# Patient Record
Sex: FEMALE | Race: WHITE | NOT HISPANIC OR LATINO | Employment: OTHER | URBAN - METROPOLITAN AREA
[De-identification: names, ages, dates, MRNs, and addresses within clinical notes are randomized per-mention and may not be internally consistent; named-entity substitution may affect disease eponyms.]

---

## 2017-10-10 DIAGNOSIS — M25.511 PAIN IN RIGHT SHOULDER: ICD-10-CM

## 2017-10-10 DIAGNOSIS — Z13.29 ENCOUNTER FOR SCREENING FOR OTHER SUSPECTED ENDOCRINE DISORDER: ICD-10-CM

## 2017-10-10 DIAGNOSIS — Z13.1 ENCOUNTER FOR SCREENING FOR DIABETES MELLITUS: ICD-10-CM

## 2017-10-10 DIAGNOSIS — Z13.6 ENCOUNTER FOR SCREENING FOR CARDIOVASCULAR DISORDERS: ICD-10-CM

## 2017-10-10 DIAGNOSIS — Z13.0 ENCOUNTER FOR SCREENING FOR DISEASES OF THE BLOOD AND BLOOD-FORMING ORGANS AND CERTAIN DISORDERS INVOLVING THE IMMUNE MECHANISM: ICD-10-CM

## 2017-10-10 DIAGNOSIS — I10 ESSENTIAL (PRIMARY) HYPERTENSION: ICD-10-CM

## 2017-10-23 ENCOUNTER — ALLSCRIPTS OFFICE VISIT (OUTPATIENT)
Dept: OTHER | Facility: OTHER | Age: 67
End: 2017-10-23

## 2017-11-05 ENCOUNTER — GENERIC CONVERSION - ENCOUNTER (OUTPATIENT)
Dept: OTHER | Facility: OTHER | Age: 67
End: 2017-11-05

## 2017-12-27 ENCOUNTER — GENERIC CONVERSION - ENCOUNTER (OUTPATIENT)
Dept: FAMILY MEDICINE CLINIC | Facility: CLINIC | Age: 67
End: 2017-12-27

## 2018-01-13 NOTE — PROGRESS NOTES
Assessment    1  Welcome to Medicare preventive visit (V70 0) (Z00 00)   2  Screening for depression (V79 0) (Z13 89)   3  Screening for neurological condition (V80 09) (Z13 89)   4  Screening for other and unspecified genitourinary condition (V81 6) (Z13 89)   5  Hypertriglyceridemia (272 1) (E78 1)   6  Chronic right shoulder pain (719 41,338 29) (M25 511,G89 29)   7  Need for vaccination with 13-polyvalent pneumococcal conjugate vaccine (V03 82) (Z23)   8  Need for immunization against influenza (V04 81) (Z23)    Plan  Chronic right shoulder pain    · Meloxicam 15 MG Oral Tablet; TAKE 1 TABLET DAILY WITH FOOD   · * XR SHOULDER 2+ VIEW RIGHT; Status:Active; Requested QRN:[id number];   Hypertriglyceridemia    · Fish Oil 1000 MG Oral Capsule; TAKE 1 CAPSULE 2 x DAILY  Need for immunization against influenza    · Fluzone High-Dose 0 5 ML Intramuscular Suspension Prefilled Syringe  Need for vaccination with 13-polyvalent pneumococcal conjugate vaccine    · Prevnar 13 Intramuscular Suspension  Screening for neurological condition    · *VB - Fall Risk Assessment  (Dx Z13 89 Screen for Neurologic Disorder);  Status:Complete;   Done: [id number] 11:53AM  Screening for other and unspecified genitourinary condition    · *VB - Urinary Incontinence Screen (Dx Z13 89 Screen for UI); Status:Complete;   Done:  [id number] 11:52AM  Welcome to Medicare preventive visit    · EKG/ECG- POC; Status:Complete;   Done: 69HKU6495 01:43PM    Discussion/Summary    Right shoulder giving her pain for the past several months  She has already established with a physical therapist at [facility] and has had a few sessions and has noticed minimal improvement  She does have significant tenderness to her right shoulder localized anterior and superior part of the joint as well as some discomfort with certain movements   I recommended obtaining an x-ray of the right shoulder which the order was provided with her today and directed her to [facility] end facility we will call her with results  I will place her on a short course of meloxicam anti-inflammatory to be taken with food for about 3-4 weeks with side effects discussed  She will continue with formal PT  We will see how she does over the course of the next few weeks and she was advised to follow up in the office with any persistent pain that may require further evaluation referral to Orthopedics  Fasting blood work was discussed with patient today  I did recommend low-fat diet as her triglycerides are elevated in addition to a daily fish oil over-the-counter  Her A1c is also elevated at 6 1% which I advised that she monitor her sugar and carbohydrate intake  She states that she just started working with a nutritionist to her exercise program at the facility where she goes and we will continue to monitor blood work  Follow up in 6 months for blood pressure check  Impression: Welcome to Medicare Visit, with preventive exam as well as age and risk appropriate counseling completed  Cardiovascular screening and counseling: the risks and benefits of screening were discussed, screening is current, EKG recommended, counseling was given on maintaining a healthy diet, counseling was given on maintaining a healthy weight, counseling was given on ways to improve cholesterol, counseling was given on ways to improve exercise tolerance and lipid panel is due every 1 year(s)  Diabetes screening and counseling: the risks and benefits of screening were discussed, screening is current, counseling was given on maintaining a healthy diet, counseling was given on maintaining a healthy weight, counseling was given on ways to improve physical activity and blood glucose is due every 1 year(s)  Colorectal cancer screening and counseling: screening is current     Breast cancer screening and counseling: the risks and benefits of screening were discussed, screening is current and MA to obtain record from [facility] where pt reported having it last done  Cervical cancer screening and counseling: screening is current and following with GYN  Osteoporosis screening and counseling: the risks and benefits of screening were discussed, counseling was given on obtaining adequate amounts of calcium and vitamin D on a daily basis and counseling was given on the importance of regular weightbearing exercise  Abdominal aortic aneurysm screening and counseling: the risks and benefits of screening were discussed and screening not indicated  Glaucoma screening and counseling: screening is current and glaucoma screening due every 1 year(s)  HIV screening and counseling: the risks and benefits of screening were discussed and screening not indicated  Hepatitis C Screening:  The patient declines Hepatitis C screening  Immunizations: influenza vaccine is due today, influenza vaccination is recommended annually, high dose admin today, pneumococcal vaccine due today, prevnar 13 admin today, pneumovax due 1 year, Zostavax vaccination up to date and Tdap vaccination up to date  Advance Directive Planning: complete and up to date, freezer packet given and discussed  requested copy of living will for EHR records  Patient Discussion: plan discussed with the patient, follow-up visit needed in 6 months  Possible side effects of new medications were reviewed with the patient/guardian today  The treatment plan was reviewed with the patient/guardian  The patient/guardian understands and agrees with the treatment plan      Chief Complaint  Pt presents for welcome to medicare  History of Present Illness  HPI: working with PT right shoulder, thinking bursitis  has been seeing PT past month and getting slightly better  Welcome to [employer] and [facility]ness Visits: The patient is being seen for the welcome to medicare visit  Medicare Screening and Risk Factors   Hospitalizations: no previous hospitalizations     Once per lifetime medicare screening tests: ECG (WNL)  Medicare Screening Tests Risk Questions   Abdominal aortic aneurysm risk assessment: none indicated  Osteoporosis risk assessment: , female gender and over 48years of age  HIV risk assessment: none indicated  Drug and Alcohol Use: The patient has never smoked cigarettes  The patient reports rare alcohol use and drinking 1-2 drinks per week  Alcohol concern:   The patient has no concerns about alcohol abuse  She has never used illicit drugs  Diet and Physical Activity: Current diet includes well balanced meals, low salt food choices, 1 cups of coffee per day and 3 bottled water/day  She exercises 4 times per week  Exercise: walking, strength training 60 minutes per day  (pt reports she is working with a Nutritionist through [employer])   Mood Disorder and Cognitive Impairment Screening: PHQ-9 Depression Scale   Over the past 2 weeks, how often have you been bothered by the following problems? 1 ) Little interest or pleasure in doing things? Not at all    2 ) Feeling down, depressed or hopeless? Not at all    3 ) Trouble falling asleep or sleeping too much? Several days  4 ) Feeling tired or having little energy? Not at all    5 ) Poor appetite or overeating? Not at all    6 ) Feeling bad about yourself, or that you are a failure, or have let yourself or your family down? Not at all    7 ) Trouble concentrating on things, such as reading a newspaper or watching television? Not at all    8 ) Moving or speaking so slowly that other people could have noticed, or the opposite, moving or speaking faster than usual? Not at all  TOTAL SCORE: 1  How difficult have these problems made it for you to do your work, take care of things at home, or get along with people? Not at all  She denies feeling down, depressed, or hopeless over the past two weeks  She denies feeling little interest or pleasure in doing things over the past two weeks     Cognitive impairment screening: denies difficulty learning/retaining new information, denies difficulty handling complex tasks, denies difficulty with reasoning, denies difficulty with spatial ability and orientation, denies difficulty with language and denies difficulty with behavior  Functional Ability/Level of Safety: Hearing is normal bilaterally  She denies hearing difficulties  She does not use a hearing aid  The patient is currently able to do activities of daily living without limitations, able to do instrumental activities of daily living without limitations, able to participate in social activities without limitations and able to drive without limitations  Activities of daily living details: does not need help using the phone, no transportation help needed, does not need help shopping, no meal preparation help needed, does not need help doing housework, does not need help doing laundry, does not need help managing medications and does not need help managing money  Fall risk factors: The patient fell 0 times in the past 12 months  Injury History: no polypharmacy, no alcohol use, no mobility impairment, no antidepressant use, no deconditioning, no postural hypotension, no sedative use, no visual impairment, no urinary incontinence, no antihypertensive use, no cognitive impairment, up and go test was normal and no previous fall  Home safety risk factors:  no grab bars in the bathroom, but no unfamiliar surroundings, no loose rugs, no poor household lighting, no uneven floors, no household clutter and handrails on the stairs  Advance Directives: Advance directives: living will, durable power of  for health care directives and advance directives  Co-Managers and Medical Equipment/Suppliers: See Patient Care Team   Preventive Quality Program 65 and Older: Falls Risk: The patient fell 0 times in the past 12 months  The patient is currently asymptomatic Symptoms Include:     The patient currently has no urinary incontinence symptoms  yearly      Review of Systems    Constitutional: negative  Eyes: negative  ENT: negative  Cardiovascular: negative  Respiratory: negative  Gastrointestinal: negative  Genitourinary: negative  Musculoskeletal: as noted in HPI  Integumentary and Breasts: negative  Neurological: negative  Psychiatric: negative  Endocrine: negative  Hematologic and Lymphatic: negative  Active Problems    1  Hypertension (401 9) (I10)   2  Insomnia (780 52) (G47 00)   3  Osteoarthritis (715 90) (M19 90)   4  Pain in right foot (729 5) (M79 671)   5  Postmenopausal (V49 81) (Z78 0)   6  Stress fracture of foot (733 95) (M84 376A)   7  Vitamin D deficiency (268 9) (E55 9)    Past Medical History    · 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  (453 51) (I82 5Y9)   · History of Encounter for screening for osteoporosis (V82 81) (Z78 090)   · History of Need for zoster vaccination (V04 89) (Z23)   · History of Screening for colon cancer (V76 51) (Z12 11)   · History of Screening for deficiency anemia (V78 1) (Z13 0)   · History of Screening for diabetes mellitus (V77 1) (Z13 1)   · History of Screening for heart disease (V81 2) (Z13 6)   · History of Screening for thyroid disorder (V77 0) (Z13 29)   · History of Symptoms involving urinary system (788 99) (R39 9)   · History of Visit for pre-operative examination (V72 84) (M93 790)    Surgical History    · History of Ankle Surgery   · History of Appendectomy   · History of Hand Surgery   · History of Knee Replacement   · History of Neuroplasty Decompression Median Nerve At Carpal Tunnel   · History of Tonsillectomy   · History of Total Knee Arthroplasty    The surgical history was reviewed and updated today         Family History  Sister    · Family history of Lung Cancer (V16 1)   · Family history of Reported Family History Of Heart Disease  Paternal Grandmother    · Family history of Hypertension (V17 49)  Family History    · Family history of Melanoma In Situ Of The Skin    The family history was reviewed and updated today  Social History    · Being A Social Drinker   · Denied: History of Drug Use   · Never a smoker   · Working Full Time   · retired  The social history was reviewed and updated today  Current Meds   1  Lisinopril-Hydrochlorothiazide 20-12 5 MG Oral Tablet; take 1 tablet by mouth daily; Therapy: 75LNA9158 to (Evaluate:28Jgx1984)  Requested for: 17Blm2995; Last   Rx:[id number] Ordered   2  Vitamin D3 2000 UNIT Oral Capsule; 1 tab PO daily; Therapy: 92VNK2862 to (Last Rx:[id number]) Ordered    The medication list was reviewed and updated today  Allergies    1  No Known Drug Allergies    Immunizations   1    Influenza  19-Oct-2015    Tdap  2010    Zoster  19-Oct-2015     Vitals  Signs    Temperature: 98 F, Tympanic  Heart Rate: 70  Pulse Quality: Normal  Respiration Quality: Normal  Respiration: 16  Systolic: 601, LUE, Sitting  Diastolic: 90, LUE, Sitting  Height: 5 ft 7 2 in  Weight: 189 lb 1 oz  BMI Calculated: 29 43  BSA Calculated: 1 98  O2 Saturation: 97, RA  LMP: Menopause  Pain Scale: 1    Physical Exam    Constitutional   General appearance: No acute distress, well appearing and well nourished  appears healthy, comfortable, overweight and appearance reflects stated age  vitals reviewed  Head and Face   Head and face: Normal     Eyes   Conjunctiva and lids: No swelling, erythema or discharge  Pupils and irises: Equal, round, reactive to light  EOMI, PERRLA  wearing hard contacts  Ears, Nose, Mouth, and Throat   External inspection of ears and nose: Normal     Otoscopic examination: Tympanic membranes translucent with normal light reflex  Canals patent without erythema  Hearing: Normal   grossly normal B/L at 10ft  Nasal mucosa, septum, and turbinates: Normal without edema or erythema  Lips, teeth, and gums: Normal, good dentition      Oropharynx: Normal with no erythema, edema, exudate or lesions  Neck   Thyroid: Normal, no thyromegaly  Pulmonary   Respiratory effort: No increased work of breathing or signs of respiratory distress  Auscultation of lungs: Clear to auscultation  Cardiovascular   Auscultation of heart: Normal rate and rhythm, normal S1 and S2, no murmurs  Carotid pulses: 2+ bilaterally  right 2+, no bruit heard over the right carotid, left 2+ and no bruit heard over the left carotid  Pedal pulses: 2+ bilaterally  Examination of extremities for edema and/or varicosities: Normal     Abdomen   Abdomen: Non-tender, no masses  The abdomen was flat  Bowel sounds were normal  The abdomen was soft and nontender  Lymphatic   Palpation of lymph nodes in neck: No lymphadenopathy  Musculoskeletal   Gait and station: Normal     Digits and nails: Normal without clubbing or cyanosis  Joints, bones, and muscles: Abnormal   right shoulder appearing normal  tenderness to palpation localized to anterior/superiod right shoulder joint without other palpable abnormality  She has full AROM of right shoulder with minimal pain upon backwards, internal rotation, as well as external rotation maneuver  strength intact and symmetrical    Skin   Skin and subcutaneous tissue: Normal without rashes or lesions  Neurologic   Cranial nerves: Cranial nerves II-XII intact  Reflexes: 2+ and symmetric  Deep tendon reflexes: 1+ right brachioradialis, 1+ left brachioradialis, 1+ right patella and 1+ left patella  Coordination: Normal finger to nose and heel to shin  Psychiatric   Judgment and insight: Normal     Orientation to person, place, and time: Normal     Mood and affect: Normal        Results/Data  EKG/ECG- POC [id number] 01:43PM Mark Seed     Test Name Result Flag Reference   EKG/ECG      RRR, no evidence of ischemia or acute fndings   low voltage most likely inaccuracy from EKG machine     *VB - Fall Risk Assessment  (Dx Z13 89 Screen for Neurologic Disorder) [id number] 11:53AM Brian Laser     Test Name Result Flag Reference   Falls Risk      No falls in the past year     *VB - Urinary Incontinence Screen (Dx Z13 89 Screen for UI) 23Oct2017 11:52AM Brian Laser     Test Name Result Flag Reference   Urinary Incontinence Assessment [id number]       *VB-Depression Screening [id number] 11:51AM Brian Laser     Test Name Result Flag Reference   Depression Scale Result      Depression Screen - Negative For Symptoms     A 12 lead ECG was performed and was normal  No acute ischemia  Rhythm and rate: normal sinus rhythm  P-waves: the P wave is normal    QRS: low voltage  ST segment: the ST segments are normal    Comparison to prior ECGs:  no prior ECGs were available for comparison  (1) CBC/PLT/DIFF 17Oct2017 08:31AM Brian Laser     Test Name Result Flag Reference   WBC COUNT 6 2     RBC COUNT 4 67     HEMOGLOBIN 14 0     HEMATOCRIT 42 2     MCV 90     MCH 30 0     MCHC 33 3     RDW 13 9     PLATELET COUNT 155     NEUTROPHILS ABSOLUTE COUNT 4 0     LYMPHOCYTES ABSOLUTE COUNT 1 4     MONOCYTES ABSOLUTE COUNT 0 6     EOSINOPHILS ABSOLUTE COUNT 0 2     BASOPHILS ABSOLUTE COUNT 0 0     NEUTROPHILS - REL 63     LYMPHOCYTES - REL 23     MONOCYTES - REL 10     EOSINOPHILS - REL 3     BASOPHILS - REL 1       (1) COMPREHENSIVE METABOLIC PANEL [id number] 26:82ZW Brian Laser     Test Name Result Flag Reference   GLUCOSE,RANDM 88     SODIUM 139     POTASSIUM 4 1     CHLORIDE 104     CARBON DIOXIDE 28     ANION GAP (CALC) 7     BLOOD UREA NITROGEN 18     CREATININE 0 70     CALCIUM 8 8     BILI, TOTAL 0 8     ALK PHOSPHATAS 81     ALT (SGPT) 18     AST(SGOT) 13     ALBUMIN 3 9     TOTAL PROTEIN 6 9     eGFR 91       (1) HEMOGLOBIN A1C 17Oct2017 08:28AM Brian Laser     Test Name Result Flag Reference   HEMOGLOBIN A1C 6 1     EST  AVG   GLUCOSE 128       (1) LIPID PANEL FASTING W DIRECT LDL REFLEX [id number] 08:27AM Brian Laser     Test Name Result Flag Reference   CHOLESTEROL 198     LDL CHOLESTEROL CALCULATED 148     TRIGLYCERIDES 207     HDL,DIRECT 50     LDL Chol   (Direct) 107       Procedure    Procedure:   Results: 20/25 in both eyes with corrective device, 20/25 in the right eye with corrective device, 20/25 in the left eye with corrective device   Color vision was screened with the [place] Test and the results were normal       Signatures   Electronically signed by : Igor Whitaker, [facility]; Oct 23 2017  1:52PM EST                       (Author)    Electronically signed by : Anupam Carver DO; Oct 25 2017 10:26PM EST                       (Co-author)

## 2018-01-14 VITALS
SYSTOLIC BLOOD PRESSURE: 144 MMHG | HEIGHT: 67 IN | DIASTOLIC BLOOD PRESSURE: 90 MMHG | WEIGHT: 189.06 LBS | HEART RATE: 70 BPM | RESPIRATION RATE: 16 BRPM | OXYGEN SATURATION: 97 % | BODY MASS INDEX: 29.67 KG/M2 | TEMPERATURE: 98 F

## 2018-01-15 NOTE — PROGRESS NOTES
Assessment    1  Hypertension (401 9) (I10)    Plan  Hypertension    · Lisinopril-Hydrochlorothiazide 20-12 5 MG Oral Tablet; 1 tab PO daily    Discussion/Summary    Patient is here for recheck of her blood pressures on lisinopril HCTZ 20-12 5  She used to be on fosinopril 40 mg and her blood pressures were unfortunately uncontrolled which is why the switch was made to lisinopril HCTZ  Tolerating the medication very well and denies any side effects  Her blood pressures seem fairly reasonable that she is checking at home and within range of where I would like  Unfortunately, patient has been off of her medication for 4 days now due to running out  Her blood pressure is uncontrolled today  I do feel patient had responded well to the lisinopril HCTZ and will continue her on current dose and get her back on that starting today with refills  I will have her come back in 4 months for another recheck and she will not be due until after that time for blood work  Her last check was October 2015 and was reviewed once more with normal kidney and liver function as well as normal lipids  She is up-to-date with her Pap and mammogram and follows with specialist  She is up-to-date with her vaccines  We will see patient in 4 months, but advised to call sooner if her blood pressures remain consistently greater than 140/90 at home since being back on medication  Possible side effects of new medications were reviewed with the patient/guardian today  The treatment plan was reviewed with the patient/guardian  The patient/guardian understands and agrees with the treatment plan      Chief Complaint  Pt here for f/u with blood pressure  She states she hasn't been taking her medication the past 4 days because she ran out  History of Present Illness  Patient is a 62y/o female here for BP med check, HTN  She is currently on lisinopril HCTZ 20-12 5mg daily  Patient feels well on medication and denies side effects   She has been off of med for past 4 days because couldn't get appt prior to meds running out and didn't realize she could fill them  She has been checking BP's at home: am readings 119/74, 125/79, 127/76, 128/78, 117/72  PM doses 129/79, 144/84, 152/86, 139/88, 143/82, 136/79  Denies chest pains, SOB, headaches, dizziness or blurred vision  Review of Systems    Constitutional: No fever, no chills, feels well, no tiredness, no recent weight gain or weight loss  Cardiovascular: No complaints of slow heart rate, no fast heart rate, no chest pain, no palpitations, no leg claudication, no lower extremity edema  Respiratory: No complaints of shortness of breath, no wheezing, no cough, no SOB on exertion, no orthopnea, no PND  Gastrointestinal: No complaints of abdominal pain, no constipation, no nausea or vomiting, no diarrhea, no bloody stools  Genitourinary: No complaints of dysuria, no incontinence, no pelvic pain, no dysmenorrhea, no vaginal discharge or bleeding  Neurological: No complaints of headache, no confusion, no convulsions, no numbness, no dizziness or fainting, no tingling, no limb weakness, no difficulty walking  Endocrine: No complaints of proptosis, no hot flashes, no muscle weakness, no deepening of the voice, no feelings of weakness  Active Problems    1  Encounter for screening for osteoporosis (V82 81) (T88 637)   2  Hypertension (401 9) (I10)   3  Insomnia (780 52) (G47 00)   4  Need for immunization against influenza (V04 81) (Z23)   5  Need for zoster vaccination (V04 89) (Z23)   6  Osteoarthritis (715 90) (M19 90)   7  Postmenopausal (V49 81) (Z78 0)   8  Vitamin D deficiency (268 9) (E55 9)    Past Medical History    1  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   (453 51) (I82 5Y9)   2  History of Pain in right foot (729 5) (M79 671)   3  History of Screening for colon cancer (V76 51) (Z12 11)   4   History of Screening for deficiency anemia (V78 1) (Z13 0)   5  History of Screening for diabetes mellitus (V77 1) (Z13 1)   6  History of Screening for heart disease (V81 2) (Z13 6)   7  History of Screening for thyroid disorder (V77 0) (Z13 29)   8  History of Symptoms involving urinary system (788 99) (R39 9)   9  History of Visit for pre-operative examination (V72 84) (X69 700)    Surgical History    1  History of Ankle Surgery   2  History of Appendectomy   3  History of Hand Surgery   4  History of Knee Replacement   5  History of Neuroplasty Decompression Median Nerve At Carpal Tunnel   6  History of Tonsillectomy   7  History of Total Knee Arthroplasty    Family History    1  Family history of Lung Cancer (V16 1)   2  Family history of Reported Family History Of Heart Disease    3  Family history of Hypertension (V17 49)    4  Family history of Melanoma In Situ Of The Skin    Social History    · Being A Social Drinker   · Denied: History of Drug Use   · Never A Smoker   · Working Full Time  The social history was reviewed and updated today  Current Meds   1  Lisinopril-Hydrochlorothiazide 20-12 5 MG Oral Tablet; 1 tab PO daily; Therapy: 52MYM7728 to (Last 72 954 091)  Requested for: 27Nov2015 Ordered   2  Vitamin D3 2000 UNIT Oral Capsule; 1 tab PO daily; Therapy: 83CHM9340 to (Last Rx:71Vgs9500) Ordered    The medication list was reviewed and updated today  Allergies    1  No Known Drug Allergies    Vitals  Vital Signs [Data Includes: Current Encounter]    Recorded: 63BLZ4575 06:25PM   Temperature 98 1 F, Tympanic   Heart Rate 84   Respiration 16   Systolic 748   Diastolic 94   Height 5 ft 6 in   Weight 188 lb 8 0 oz   BMI Calculated 30 42   BSA Calculated 1 96   O2 Saturation 98, RA     Physical Exam    Constitutional   General appearance: Abnormal   appears healthy, overweight and appearance reflects stated age  Pulmonary   Respiratory effort: No increased work of breathing or signs of respiratory distress      Auscultation of lungs: Clear to auscultation  Cardiovascular   Auscultation of heart: Normal rate and rhythm, normal S1 and S2, without murmurs  Examination of extremities for edema and/or varicosities: Abnormal   trace B/L nonpitting edema B/L feet and ankles  pedal pulses are palpable B/L  Carotid pulses: Normal   right 2+, no bruit heard over the right carotid, left 2+ and no bruit heard over the left carotid  Abdomen   Abdomen: Non-tender, no masses  The abdomen was flat  Bowel sounds were normal  The abdomen was soft and nontender  Lymphatic   Palpation of lymph nodes in neck: No lymphadenopathy      Psychiatric   Orientation to person, place, and time: Normal          Signatures   Electronically signed by : Jaziel Hatch [facility]; Feb 4 2016  9:23AM EST                       (Author)    Electronically signed by : Joel Pool DO; Feb 5 2016 11:58AM EST                       (Co-author)

## 2018-02-17 DIAGNOSIS — I10 ESSENTIAL HYPERTENSION: Primary | ICD-10-CM

## 2018-02-17 RX ORDER — LISINOPRIL AND HYDROCHLOROTHIAZIDE 20; 12.5 MG/1; MG/1
TABLET ORAL
Qty: 30 TABLET | Refills: 0 | Status: SHIPPED | OUTPATIENT
Start: 2018-02-17 | End: 2018-07-17 | Stop reason: SDUPTHER

## 2018-05-14 ENCOUNTER — OFFICE VISIT (OUTPATIENT)
Dept: FAMILY MEDICINE CLINIC | Facility: CLINIC | Age: 68
End: 2018-05-14
Payer: MEDICARE

## 2018-05-14 VITALS
BODY MASS INDEX: 30.73 KG/M2 | HEART RATE: 80 BPM | OXYGEN SATURATION: 97 % | TEMPERATURE: 98 F | HEIGHT: 66 IN | WEIGHT: 191.2 LBS | DIASTOLIC BLOOD PRESSURE: 80 MMHG | SYSTOLIC BLOOD PRESSURE: 130 MMHG

## 2018-05-14 DIAGNOSIS — M75.121 COMPLETE TEAR OF RIGHT ROTATOR CUFF: ICD-10-CM

## 2018-05-14 DIAGNOSIS — Z86.718 HISTORY OF DVT (DEEP VEIN THROMBOSIS): ICD-10-CM

## 2018-05-14 DIAGNOSIS — I10 ESSENTIAL HYPERTENSION: ICD-10-CM

## 2018-05-14 DIAGNOSIS — E78.1 HYPERTRIGLYCERIDEMIA: ICD-10-CM

## 2018-05-14 DIAGNOSIS — Z01.818 PREOPERATIVE GENERAL PHYSICAL EXAMINATION: Primary | ICD-10-CM

## 2018-05-14 LAB
BASOPHILS # BLD AUTO: 0.03 THOUSANDS/ΜL (ref 0–0.1)
BASOPHILS NFR BLD AUTO: 0 % (ref 0–1)
EOSINOPHIL # BLD AUTO: 0.16 THOUSAND/ΜL (ref 0–0.61)
EOSINOPHIL NFR BLD AUTO: 2 % (ref 0–6)
ERYTHROCYTE [DISTWIDTH] IN BLOOD BY AUTOMATED COUNT: 14 % (ref 11.6–15.1)
HCT VFR BLD AUTO: 43.9 % (ref 34.8–46.1)
HGB BLD-MCNC: 14 G/DL (ref 11.5–15.4)
INR PPP: 0.99 (ref 0.86–1.16)
LYMPHOCYTES # BLD AUTO: 1.73 THOUSANDS/ΜL (ref 0.6–4.47)
LYMPHOCYTES NFR BLD AUTO: 26 % (ref 14–44)
MCH RBC QN AUTO: 30.3 PG (ref 26.8–34.3)
MCHC RBC AUTO-ENTMCNC: 31.9 G/DL (ref 31.4–37.4)
MCV RBC AUTO: 95 FL (ref 82–98)
MONOCYTES # BLD AUTO: 0.45 THOUSAND/ΜL (ref 0.17–1.22)
MONOCYTES NFR BLD AUTO: 7 % (ref 4–12)
NEUTROPHILS # BLD AUTO: 4.29 THOUSANDS/ΜL (ref 1.85–7.62)
NEUTS SEG NFR BLD AUTO: 65 % (ref 43–75)
NRBC BLD AUTO-RTO: 0 /100 WBCS
PLATELET # BLD AUTO: 256 THOUSANDS/UL (ref 149–390)
PMV BLD AUTO: 11.6 FL (ref 8.9–12.7)
PROTHROMBIN TIME: 13.1 SECONDS (ref 12.1–14.4)
RBC # BLD AUTO: 4.62 MILLION/UL (ref 3.81–5.12)
WBC # BLD AUTO: 6.67 THOUSAND/UL (ref 4.31–10.16)

## 2018-05-14 PROCEDURE — 85610 PROTHROMBIN TIME: CPT | Performed by: PHYSICIAN ASSISTANT

## 2018-05-14 PROCEDURE — 85025 COMPLETE CBC W/AUTO DIFF WBC: CPT | Performed by: PHYSICIAN ASSISTANT

## 2018-05-14 PROCEDURE — 99214 OFFICE O/P EST MOD 30 MIN: CPT | Performed by: PHYSICIAN ASSISTANT

## 2018-05-14 RX ORDER — NAPROXEN 500 MG/1
TABLET ORAL
COMMUNITY
End: 2018-05-14

## 2018-05-14 RX ORDER — BUPIVACAINE HYDROCHLORIDE 5 MG/ML
INJECTION, SOLUTION PERINEURAL
COMMUNITY
End: 2018-05-14

## 2018-05-14 RX ORDER — SULFAMETHOXAZOLE AND TRIMETHOPRIM 800; 160 MG/1; MG/1
TABLET ORAL
COMMUNITY
End: 2018-05-14

## 2018-05-14 RX ORDER — ZOLPIDEM TARTRATE 10 MG/1
TABLET ORAL
COMMUNITY
End: 2018-05-14

## 2018-05-14 RX ORDER — FLUCONAZOLE 150 MG/1
TABLET ORAL
COMMUNITY
End: 2018-05-14

## 2018-05-14 RX ORDER — AMOXICILLIN 500 MG/1
CAPSULE ORAL
COMMUNITY
Start: 2017-06-20 | End: 2018-05-14

## 2018-05-14 RX ORDER — OXYCODONE HYDROCHLORIDE AND ACETAMINOPHEN 5; 325 MG/1; MG/1
TABLET ORAL
COMMUNITY
End: 2018-05-14

## 2018-05-14 RX ORDER — MELOXICAM 15 MG/1
1 TABLET ORAL DAILY
COMMUNITY
Start: 2017-10-23 | End: 2018-05-14

## 2018-05-14 RX ORDER — CHLORHEXIDINE GLUCONATE 4 G/100ML
SOLUTION TOPICAL
COMMUNITY
End: 2018-05-14

## 2018-05-14 RX ORDER — ESTRADIOL 0.1 MG/G
2 CREAM VAGINAL DAILY
COMMUNITY

## 2018-05-14 RX ORDER — ESTRADIOL 0.1 MG/G
CREAM VAGINAL
Refills: 6 | COMMUNITY
Start: 2018-03-27 | End: 2018-05-14

## 2018-05-14 RX ORDER — WARFARIN SODIUM 2 MG/1
TABLET ORAL
COMMUNITY
End: 2018-05-14

## 2018-05-14 RX ORDER — TRAMADOL HYDROCHLORIDE 50 MG/1
50 TABLET ORAL EVERY 12 HOURS PRN
Refills: 0 | COMMUNITY
Start: 2018-04-13 | End: 2019-10-23 | Stop reason: ALTCHOICE

## 2018-05-14 RX ORDER — CHLORAL HYDRATE 500 MG
1000 CAPSULE ORAL DAILY
COMMUNITY
Start: 2017-10-23

## 2018-05-14 RX ORDER — ZOLPIDEM TARTRATE 5 MG/1
TABLET ORAL
COMMUNITY
End: 2018-05-14

## 2018-05-14 RX ORDER — METAXALONE 800 MG/1
TABLET ORAL
COMMUNITY
End: 2018-05-14

## 2018-05-14 RX ORDER — IBUPROFEN 800 MG/1
TABLET ORAL
COMMUNITY
Start: 2016-07-15 | End: 2018-05-14

## 2018-05-14 RX ORDER — METHYLPREDNISOLONE ACETATE 40 MG/ML
1 INJECTION, SUSPENSION INTRA-ARTICULAR; INTRALESIONAL; INTRAMUSCULAR; SOFT TISSUE
COMMUNITY
End: 2018-05-14

## 2018-05-14 RX ORDER — ACETAMINOPHEN 160 MG
1 TABLET,DISINTEGRATING ORAL DAILY
COMMUNITY
Start: 2015-09-22

## 2018-05-14 RX ORDER — CELECOXIB 200 MG/1
CAPSULE ORAL
COMMUNITY
End: 2018-05-14

## 2018-05-14 RX ORDER — CEPHALEXIN 500 MG/1
CAPSULE ORAL
COMMUNITY
End: 2018-05-14

## 2018-05-14 RX ORDER — LISINOPRIL 40 MG/1
TABLET ORAL
COMMUNITY
End: 2018-05-14

## 2018-05-14 RX ORDER — AMITRIPTYLINE HYDROCHLORIDE 10 MG/1
TABLET, FILM COATED ORAL
COMMUNITY
End: 2018-05-14

## 2018-05-14 RX ORDER — ATENOLOL AND CHLORTHALIDONE TABLET 50; 25 MG/1; MG/1
TABLET ORAL
COMMUNITY
End: 2018-05-14

## 2018-05-14 NOTE — PROGRESS NOTES
Assessment/Plan:    Hypertension    Blood pressure stable today at 130/80 on lisinopril HCTZ  Hypertriglyceridemia    Patient taking fish oil daily  Diagnoses and all orders for this visit:    Preoperative general physical examination  -     CBC and differential  -     Protime-INR; Future  -     Protime-INR    Complete tear of right rotator cuff  -     CBC and differential  -     Protime-INR; Future  -     Protime-INR    History of DVT (deep vein thrombosis)  -     CBC and differential  -     Protime-INR; Future  -     Protime-INR    Essential hypertension  -     CBC and differential  -     Protime-INR; Future  -     Protime-INR    Hypertriglyceridemia    Other orders  -     Discontinue: amitriptyline (ELAVIL) 10 mg tablet; amitriptyline 10 mg tablet  -     Discontinue: atenolol-chlorthalidone (TENORETIC) 50-25 mg per tablet; atenolol 50 mg-chlorthalidone 25 mg tablet  -     Discontinue: celecoxib (CELEBREX) 200 mg capsule; Celebrex 200 mg capsule  -     Discontinue: cephalexin (KEFLEX) 500 mg capsule; cephalexin 500 mg capsule  -     Discontinue: methylPREDNISolone acetate (DEPO-MEDROL) 40 mg/mL injection; 1 mL  -     Discontinue: fluconazole (DIFLUCAN) 150 mg tablet; fluconazole 150 mg tablet  -     Discontinue: chlorhexidine (HIBICLENS) 4 % external liquid; Wash surgical area daily starting 5 days prior to surgery    -     Discontinue: bupivacaine (MARCAINE) 0 5 %; Marcaine 0 5 % (5 mg/mL) injection solution  -     Discontinue: metaxalone (SKELAXIN) 800 mg tablet; metaxalone 800 mg tablet  -     Discontinue: naproxen (NAPROSYN) 500 mg tablet; naproxen 500 mg tablet  -     Discontinue: oxyCODONE-acetaminophen (PERCOCET) 5-325 mg per tablet; oxycodone-acetaminophen 5 mg-325 mg tablet  -     Discontinue: sulfamethoxazole-trimethoprim (BACTRIM DS) 800-160 mg per tablet; sulfamethoxazole 800 mg-trimethoprim 160 mg tablet  -     Discontinue: warfarin (COUMADIN) 2 mg tablet; warfarin 2 mg tablet  - Discontinue: zolpidem (AMBIEN) 10 mg tablet; zolpidem 10 mg tablet  -     Discontinue: zolpidem (AMBIEN) 5 mg tablet; zolpidem 5 mg tablet  -     estradiol (ESTRACE) 0 1 mg/g vaginal cream; Insert 2 g into the vagina daily        Patient is a 79-year-old female presenting today for preoperative clearance for repair of right rotator cuff tear  The surgery is scheduled for 5/31/18 through 0  orthopedic specialist Dr Risa Natarajan  Patient's chronic conditions include hypertension and hypertriglyceridemia which are currently under control with lisinopril HCTZ and OTC fish oil  She is otherwise in good health and is asymptomatic from a cardiovascular standpoint  Her exam is unremarkable  Of concern, she does have history of DVT following a previous lower extremity surgery and was also on birth control pills  She has not had any recurrence though she states in the past surgeons have put her on Coumadin for short while following the surgery  I do not believe at this time Coumadin is necessary being that she is still able to move around and be mobile  However, patient is very worried about this  I told patient she can consider 325 mg aspirin daily following her surgery  She cannot exercise or do a lot of walking but I told her she can still move around to improve her vascular flow which should be fine  She will discuss this with her surgeon  Patient is considered low risk for surgical complication based on the assessment today and she is cleared for surgery as planned  I did check a CBC and PT INR in lieu of her history of DVT which all was normal     Chief Complaint   Patient presents with    Pre-op Exam     shoulder surgery       Subjective:      Patient ID: Megan Voss is a 78 y o  female  68y/o female here today for preop clearance for right shoulder surgery, rotator tear  Surgery scheduled for 5/31  Surgeon is Dr Santi Nova  She overall feels well, no complaints today   She has hx of surgeries - she has had issues with anesthesia before - excessive vomiting after anesthesia, severe constipation  She will be meeting with anesthesiologist  Denies recent CP, SOB, hemoptysis, coughing, no recent fevers, LE swelling  She can walk a few blocks or walk up steps without reproducing CP or SOB or exertional sxs  No dizziness or lightheadedness  She does have a hx of DVT, she states she was put on coumadin in the past after surgery - had oral BC at the time of DVT and surgery was for LE  She states surgeon does not advise blood thinner after surgery  she will be in a sling x 1 month and surgeon has advised against walking or exercise to avoid sweating at the incision site  She has a good support at home and and feels safe at home post-op  The following portions of the patient's history were reviewed and updated as appropriate: allergies, current medications, past family history, past medical history, past social history, past surgical history and problem list     Review of Systems   Constitutional: Negative  HENT: Negative  Eyes: Negative  Respiratory: Negative  Cardiovascular: Negative  Gastrointestinal: Negative  Endocrine: Negative  Genitourinary: Negative  Musculoskeletal:        As in HPI   Allergic/Immunologic: Negative  Neurological: Negative  Hematological: Negative  Psychiatric/Behavioral: Negative  Objective:      /80 (BP Location: Left arm, Patient Position: Sitting)   Pulse 80   Temp 98 °F (36 7 °C) (Tympanic)   Ht 5' 6 25" (1 683 m)   Wt 86 7 kg (191 lb 3 2 oz)   SpO2 97%   BMI 30 63 kg/m²          Physical Exam   Constitutional: She is oriented to person, place, and time  Vital signs are normal  She appears well-developed and well-nourished  She does not appear ill  No distress  HENT:   Head: Normocephalic     Right Ear: Hearing and tympanic membrane normal    Left Ear: Hearing and tympanic membrane normal    Nose: Nose normal  Mouth/Throat: Oropharynx is clear and moist    Eyes: Conjunctivae, EOM and lids are normal  Pupils are equal, round, and reactive to light  Neck: Trachea normal and normal range of motion  Neck supple  Normal carotid pulses present  No thyroid mass present  Cardiovascular: Normal rate, regular rhythm, S1 normal, S2 normal and normal pulses  No murmur heard  Pulmonary/Chest: Effort normal and breath sounds normal    Abdominal: Soft  Normal appearance, normal aorta and bowel sounds are normal  There is no tenderness  Musculoskeletal:   Gait normal    Lymphadenopathy:     She has no cervical adenopathy  Neurological: She is alert and oriented to person, place, and time  No cranial nerve deficit or sensory deficit  Skin: Skin is intact  Psychiatric: She has a normal mood and affect  Her behavior is normal  Cognition and memory are normal    Vitals reviewed

## 2018-06-19 ENCOUNTER — TRANSCRIBE ORDERS (OUTPATIENT)
Dept: PHYSICAL THERAPY | Facility: CLINIC | Age: 68
End: 2018-06-19

## 2018-06-19 ENCOUNTER — EVALUATION (OUTPATIENT)
Dept: PHYSICAL THERAPY | Facility: CLINIC | Age: 68
End: 2018-06-19
Payer: MEDICARE

## 2018-06-19 DIAGNOSIS — Z98.890 PERSONAL HISTORY OF SURGERY TO HEART AND GREAT VESSELS, PRESENTING HAZARDS TO HEALTH: ICD-10-CM

## 2018-06-19 DIAGNOSIS — Z98.890 S/P RIGHT ROTATOR CUFF REPAIR: Primary | ICD-10-CM

## 2018-06-19 DIAGNOSIS — M75.101 ROTATOR CUFF SYNDROME OF RIGHT SHOULDER: ICD-10-CM

## 2018-06-19 DIAGNOSIS — M75.101 TEAR OF RIGHT ROTATOR CUFF, UNSPECIFIED TEAR EXTENT: ICD-10-CM

## 2018-06-19 DIAGNOSIS — Z98.890 S/P ROTATOR CUFF REPAIR: Primary | ICD-10-CM

## 2018-06-19 PROCEDURE — G8991 OTHER PT/OT GOAL STATUS: HCPCS | Performed by: PHYSICAL THERAPIST

## 2018-06-19 PROCEDURE — 97162 PT EVAL MOD COMPLEX 30 MIN: CPT | Performed by: PHYSICAL THERAPIST

## 2018-06-19 PROCEDURE — 97140 MANUAL THERAPY 1/> REGIONS: CPT | Performed by: PHYSICAL THERAPIST

## 2018-06-19 PROCEDURE — G8990 OTHER PT/OT CURRENT STATUS: HCPCS | Performed by: PHYSICAL THERAPIST

## 2018-06-19 NOTE — PROGRESS NOTES
PT Evaluation     Today's date: 2018  Patient name: Chirag Callaway  : 1950  MRN: [id number]  Referring provider: Briant Riedel*  Dx:   Encounter Diagnosis     ICD-10-CM    1  S/P right rotator cuff repair Z98 890    2  Tear of right rotator cuff, unspecified tear extent M75 101                   Assessment    Assessment details: Patient is a 78 y o  female who  presents with pain, range of motion loss, weakness s/p R arthroscopic rotator cuff repair, subacromial decompression, 18  Functional limitations as a result of impairments  Patient would benefit from course of skilled physical therapy to address above listed impairments in an effort to improve function  Understanding of Dx/Px/POC: excellent  Goals  ST : Decrease R shoulder pain to 2/10 at worst x 1 continuous week within 6 weeks         PROM: Improve R shoulder PROM to 120 degrees for flexion/scaption, ER to 45 degrees, IR to 45 degrees within 6 weeks  AROM Improve R shoulder AROM flexion to 90 degrees in 6-8 weeks  Pt to begin submaximal shoulder isometrics by 8  weeks post op  Improved FOTO self rated functional scale score, pt to note greater ease with dressing, return to driving within 6 weeks  LT : Eliminate R shoulder pain x 1 continuous week within 12 weeks  PROM: Improve R shoulder PROM to 170 degrees for flexion/scaption, ER to 90 degrees, IR to 75degrees within 12 weeks  AROM :  Improve R shoulder AROM to 170 degrees for flexion/scaption, ER to 90 degrees, IR to T7 level within 16 weeks  Strength: 5/5 R shoulder stength within 16 weeks  Function: FOTO score to be at least 70 within 16-20 weeks           Independent with home exercise program        Plan  Patient would benefit from: skilled PT  Planned modality interventions: cryotherapy, TENS, thermotherapy: hydrocollator packs and ultrasound  Planned therapy interventions: ADL training, body mechanics training, functional ROM exercises, home exercise program, joint mobilization, manual therapy, neuromuscular re-education, patient education, postural training, strengthening, stretching, therapeutic activities and therapeutic exercise  Frequency: 2-3x's week  Duration in weeks: 20  Treatment plan discussed with: patient        Subjective Evaluation    History of Present Illness  Date of surgery: 2018  Mechanism of injury: Several months of R shoulder pain (insidious onset)  Physical therapy failed to relieve pain, injection failed to fully relieve pain  MRI + for internal derangement  Underwent a (R) shoulder arthroscopy on 16 for a supraspinatus repair (double row), subacromial decompression  Post op complaints are of pain, inability to use R arm dressing, bathing, difficulty sleeping, inability to drive, inability to participate in desired recreational activities  PMH significant for HTN  Pain  No pain reported  Current pain ratin  At best pain ratin  At worst pain rating: 3  Quality: dull ache    Social Support  Lives with: alone    Hand dominance: right    Patient Goals  Patient goals for therapy: decreased pain, independence with ADLs/IADLs, increased strength, return to sport/leisure activities and increased motion  Patient goal: return to playing pickleball, walking for exercise, working out in gym        Objective     General Comments     Shoulder Comments   Arthroscopic surgical incision sites are healed    Pt wears sling for R UE    No shoulder edema    PROM R shoulder:  FF: 90 degrees  Scaption: 90 degrees  ER: 30 (plane of scaption)  IR: 30 (plan of scaption)    AROM/Strength testing of  Rshoulder NP secondary to recency of surgery      Elbow AROM:0-150 degrees bilaterally  Wrist/Finger AROM WNL bilaterally      L shoulder AROM  FF: 170 degrees  ABd: 90 degrees, pain  ER: 90 degrees  IR: T7    TTP L supraspinatus tendon  5/5 L UE strength except for ABD: 4-/5 with pain ER: 4/5 with pain      UE sensation intact to light touch  Cervical screen is unremarkable              Precautions: HTN, follow Dr Ariel Herrera protocol    Daily Treatment Diary       Manuals 6/19            Gentle PROM Flexion, scaption, IR/ER (IR/ER in plane of scaption) RG                                                   Exercise Diary              Pendulums NV            Elbow AROM Flexion/Extension NV            Scapular setting isometrics NV                                                                                                                                                                                                                                                                   Modalities             Cryo R shoulder 10 min

## 2018-06-19 NOTE — LETTER
2018    Yoandy Robins MD  [address] [place] [id number]    Patient: Epifanio Leyva   YOB: 1950   Date of Visit: 2018     Encounter Diagnosis     ICD-10-CM    1  S/P right rotator cuff repair Z98 890    2  Tear of right rotator cuff, unspecified tear extent M75 101        Dear Dr Ayah Talavera:    Please review the attached Plan of Care from [facility] recent visit  Please verify that you agree therapy should continue by signing the attached document and sending it back to our office  If you have any questions or concerns, please don't hesitate to call  Sincerely,    Sariah Stark, PT      Referring Provider:      I certify that I have read the below Plan of Care and certify the need for these services furnished under this plan of treatment while under my care  Yoandy Robins MD  [address] [place] [address]: [phone]          PT Evaluation     Today's date: 2018  Patient name: Epifanio Leyva  : 1950  MRN: [id number]  Referring provider: Abdi Boswell*  Dx:   Encounter Diagnosis     ICD-10-CM    1  S/P right rotator cuff repair Z98 890    2  Tear of right rotator cuff, unspecified tear extent M75 101                   Assessment    Assessment details: Patient is a 78 y o  female who  presents with pain, range of motion loss, weakness s/p R arthroscopic rotator cuff repair, subacromial decompression, 18  Functional limitations as a result of impairments  Patient would benefit from course of skilled physical therapy to address above listed impairments in an effort to improve function  Understanding of Dx/Px/POC: excellent  Goals  ST : Decrease R shoulder pain to 2/10 at worst x 1 continuous week within 6 weeks         PROM: Improve R shoulder PROM to 120 degrees for flexion/scaption, ER to 45 degrees, IR to 45 degrees within 6 weeks           AROM Improve R shoulder AROM flexion to 90 degrees in 6-8 weeks  Pt to begin submaximal shoulder isometrics by 8  weeks post op  Improved FOTO self rated functional scale score, pt to note greater ease with dressing, return to driving within 6 weeks  LT : Eliminate R shoulder pain x 1 continuous week within 12 weeks  PROM: Improve R shoulder PROM to 170 degrees for flexion/scaption, ER to 90 degrees, IR to 75degrees within 12 weeks  AROM :  Improve R shoulder AROM to 170 degrees for flexion/scaption, ER to 90 degrees, IR to T7 level within 16 weeks  Strength: 5/5 R shoulder stength within 16 weeks  Function: FOTO score to be at least 70 within 16-20 weeks  Independent with home exercise program        Plan  Patient would benefit from: skilled PT  Planned modality interventions: cryotherapy, TENS, thermotherapy: hydrocollator packs and ultrasound  Planned therapy interventions: ADL training, body mechanics training, functional ROM exercises, home exercise program, joint mobilization, manual therapy, neuromuscular re-education, patient education, postural training, strengthening, stretching, therapeutic activities and therapeutic exercise  Frequency: 2-3x's week  Duration in weeks: 20  Treatment plan discussed with: patient        Subjective Evaluation    History of Present Illness  Date of surgery: 2018  Mechanism of injury: Several months of R shoulder pain (insidious onset)  Physical therapy failed to relieve pain, injection failed to fully relieve pain  MRI + for internal derangement  Underwent a (R) shoulder arthroscopy on 16 for a supraspinatus repair (double row), subacromial decompression  Post op complaints are of pain, inability to use R arm dressing, bathing, difficulty sleeping, inability to drive, inability to participate in desired recreational activities      PMH significant for HTN  Pain  No pain reported  Current pain ratin  At best pain ratin  At worst pain rating: 3  Quality: dull ache    Social Support  Lives with: alone    Hand dominance: right    Patient Goals  Patient goals for therapy: decreased pain, independence with ADLs/IADLs, increased strength, return to sport/leisure activities and increased motion  Patient goal: return to playing pickleball, walking for exercise, working out in gym        Objective     General Comments     Shoulder Comments   Arthroscopic surgical incision sites are healed    Pt wears sling for R UE    No shoulder edema    PROM R shoulder:  FF: 90 degrees  Scaption: 90 degrees  ER: 30 (plane of scaption)  IR: 30 (plan of scaption)    AROM/Strength testing of  Rshoulder NP secondary to recency of surgery  Elbow AROM:0-150 degrees bilaterally  Wrist/Finger AROM WNL bilaterally      L shoulder AROM  FF: 170 degrees  ABd: 90 degrees, pain  ER: 90 degrees  IR: T7    TTP L supraspinatus tendon  5/5 L UE strength except for ABD: 4-/5 with pain ER: 4/5 with pain      UE sensation intact to light touch  Cervical screen is unremarkable              Precautions: HTN, follow Dr Tila Roach protocol    Daily Treatment Diary       Manuals 6/19            Gentle PROM Flexion, scaption, IR/ER (IR/ER in plane of scaption) RG                                                   Exercise Diary              Pendulums NV            Elbow AROM Flexion/Extension NV            Scapular setting isometrics NV                                                                                                                                                                                                                                                                   Modalities             Cryo R shoulder 10 min

## 2018-06-22 ENCOUNTER — OFFICE VISIT (OUTPATIENT)
Dept: PHYSICAL THERAPY | Facility: CLINIC | Age: 68
End: 2018-06-22
Payer: MEDICARE

## 2018-06-22 DIAGNOSIS — M75.101 TEAR OF RIGHT ROTATOR CUFF, UNSPECIFIED TEAR EXTENT: ICD-10-CM

## 2018-06-22 DIAGNOSIS — Z98.890 S/P RIGHT ROTATOR CUFF REPAIR: Primary | ICD-10-CM

## 2018-06-22 PROCEDURE — 97140 MANUAL THERAPY 1/> REGIONS: CPT | Performed by: PHYSICAL THERAPIST

## 2018-06-22 NOTE — PROGRESS NOTES
Daily Note     Today's date: 2018  Patient name: Lalo Kong  : 1950  MRN: [id number]  Referring provider: Jong Jansen*  Dx:   Encounter Diagnosis     ICD-10-CM    1  S/P right rotator cuff repair Z98 890    2  Tear of right rotator cuff, unspecified tear extent M75 101                   Subjective: Notes compliance with sing use and HEP  No pain > 2/10 since IE  Objective: See treatment diary below  PROM R shoulder:  FF: 120 degrees  Scaption: 120 degrees  ER:   60 degrees  IR:40 degrees      Assessment: Tolerated treatment well  PROM is excellent for 3 weeks, 1 day postop  Again advised patient she should not be actively using/moving her shoulder  Plan: Continue per plan of care       Precautions: HTN, follow Dr Mya Pal protocol     Daily Treatment Diary         Manuals                    Gentle PROM Flexion, scaption, IR/ER (IR/ER in plane of scaption) RG  RG                                                                                           Exercise Diary                        Pendulums NV  10 cw/ccw x 3                   Elbow AROM Flexion/Extension NV  x 20                   Scapular setting isometrics NV  x 20                                                                                                                                                                                                                                                                                                                                                                                                                                                                                           Modalities                       Cryo R shoulder 10 min  10 min

## 2018-06-27 ENCOUNTER — OFFICE VISIT (OUTPATIENT)
Dept: PHYSICAL THERAPY | Facility: CLINIC | Age: 68
End: 2018-06-27
Payer: MEDICARE

## 2018-06-27 DIAGNOSIS — M75.101 TEAR OF RIGHT ROTATOR CUFF, UNSPECIFIED TEAR EXTENT: ICD-10-CM

## 2018-06-27 DIAGNOSIS — Z98.890 S/P RIGHT ROTATOR CUFF REPAIR: Primary | ICD-10-CM

## 2018-06-27 PROCEDURE — 97140 MANUAL THERAPY 1/> REGIONS: CPT | Performed by: PHYSICAL THERAPIST

## 2018-06-27 NOTE — PROGRESS NOTES
Daily Note     Today's date: 2018  Patient name: Chirag Callaway  : 1950  MRN: [id number]  Referring provider: Briant Riedel*  Dx:   Encounter Diagnosis     ICD-10-CM    1  S/P right rotator cuff repair Z98 890    2  Tear of right rotator cuff, unspecified tear extent M75 101                   Subjective: Carlos Eduardo to sleep one night in bed vs recliner  Is mostly pain free at R shoulder  Objective: See treatment diary below  PROM R shoulder:  FF: 140 degrees  Scaption: 145 degrees  ER:   65 degrees  IR:45 degrees      Assessment: PROM is excellent for 3 weeks, 6 day postop  Again advised patient she should not be actively using/moving her shoulder  Plan: Continue per plan of care       Precautions: HTN, follow Dr Sakina Crenshaw protocol     Daily Treatment Diary         Manuals                  Gentle PROM Flexion, scaption, IR/ER (IR/ER in plane of scaption) RG  RG  RG                                                                                         Exercise Diary                        Pendulums NV  10 cw/ccw x 3  10cw/ccw x 3                 Elbow AROM Flexion/Extension NV  x 20  x 20                 Scapular setting isometrics NV  x 20  x 20                                                                                                                                                                                                                                                                                                                                                                                                                                                                                         Modalities                       Cryo R shoulder 10 min  10 min  10 min

## 2018-06-29 ENCOUNTER — OFFICE VISIT (OUTPATIENT)
Dept: PHYSICAL THERAPY | Facility: CLINIC | Age: 68
End: 2018-06-29
Payer: MEDICARE

## 2018-06-29 DIAGNOSIS — M75.101 TEAR OF RIGHT ROTATOR CUFF, UNSPECIFIED TEAR EXTENT: Primary | ICD-10-CM

## 2018-06-29 DIAGNOSIS — Z98.890 S/P RIGHT ROTATOR CUFF REPAIR: ICD-10-CM

## 2018-06-29 PROCEDURE — 97140 MANUAL THERAPY 1/> REGIONS: CPT | Performed by: PHYSICAL THERAPIST

## 2018-06-29 NOTE — PROGRESS NOTES
Daily Note     Today's date: 2018  Patient name: Kathy Trotter  : 1950  MRN: [id number]  Referring provider: Tito Mosher*  Dx:   Encounter Diagnosis     ICD-10-CM    1  Tear of right rotator cuff, unspecified tear extent M75 101    2  S/P right rotator cuff repair Z98 890                   Subjective: Sleeping in bed full time vs recliner  No pain > 3/10 since last visit  Objective: See treatment diary below  PROM R shoulder:  FF: 145 degrees  Scaption: 145 degrees  ER:   65 degrees  IR:45 degrees      Assessment: Patient is now 4 weeks, one day post op  Doing well      Plan: Continue per plan of care    Will progress to supine self AA flexion at 5 weeks post op per protocol ()  Precautions: HTN, follow Dr Farrah Watkins protocol     Daily Treatment Diary         Manuals                Gentle PROM Flexion, scaption, IR/ER (IR/ER in plane of scaption) RG  RG  RG  RG                                                                                       Exercise Diary                        Pendulums NV  10 cw/ccw x 3  10cw/ccw x 3  10 cw/ccw x 3               Elbow AROM Flexion/Extension NV  x 20  x 20  x 30               Scapular setting isometrics NV  x 20  x 20  x 30                                                                                                                                                                                                                                                                                                                                                                                                                                                                                       Modalities                       Cryo R shoulder 10 min  10 min  10 min  10 min

## 2018-07-02 ENCOUNTER — OFFICE VISIT (OUTPATIENT)
Dept: PHYSICAL THERAPY | Facility: CLINIC | Age: 68
End: 2018-07-02
Payer: MEDICARE

## 2018-07-02 DIAGNOSIS — Z98.890 S/P RIGHT ROTATOR CUFF REPAIR: Primary | ICD-10-CM

## 2018-07-02 DIAGNOSIS — M75.101 TEAR OF RIGHT ROTATOR CUFF, UNSPECIFIED TEAR EXTENT: ICD-10-CM

## 2018-07-02 PROCEDURE — 97140 MANUAL THERAPY 1/> REGIONS: CPT | Performed by: PHYSICAL THERAPIST

## 2018-07-02 NOTE — PROGRESS NOTES
Daily Note     Today's date: 2018  Patient name: Kathy Trotter  : 1950  MRN: [id number]  Referring provider: Tito Mosher*  Dx:   Encounter Diagnosis     ICD-10-CM    1  S/P right rotator cuff repair Z98 890    2  Tear of right rotator cuff, unspecified tear extent M75 101               Subjective: Mild soreness this morning  She is wondering if she should wear her sling at festival which is on 5 week post op [place]  Objective: See treatment diary below    Precautions: HTN, follow Dr Farrah Watkins protocol     Daily Treatment Diary      Manuals   7/2             Gentle PROM Flexion, scaption, IR/ER (IR/ER in plane of scaption) RG  RG  RG  RG  RG                                                                                     Exercise Diary     7/2             Pendulums NV  10 cw/ccw x 3  10cw/ccw x 3  10 cw/ccw x 3  30x ea CW/CCW             Elbow AROM Flexion/Extension NV  x 20  x 20  x 30  30x AROM elbow             Scapular setting isometrics NV  x 20  x 20  x 30  30x ea                                                                                                                                                                                     Modalities    7/2             Cryo R shoulder 10 min  10 min  10 min  10 min  10 mins               Assessment: Tolerated treatment well  Advised patient she should continue to wear her sling in crowds even after 5 week post op [place]  Plan: Continue per plan of care

## 2018-07-05 ENCOUNTER — OFFICE VISIT (OUTPATIENT)
Dept: PHYSICAL THERAPY | Facility: CLINIC | Age: 68
End: 2018-07-05
Payer: MEDICARE

## 2018-07-05 DIAGNOSIS — Z98.890 S/P RIGHT ROTATOR CUFF REPAIR: Primary | ICD-10-CM

## 2018-07-05 DIAGNOSIS — M75.101 TEAR OF RIGHT ROTATOR CUFF, UNSPECIFIED TEAR EXTENT: ICD-10-CM

## 2018-07-05 PROCEDURE — 97140 MANUAL THERAPY 1/> REGIONS: CPT | Performed by: PHYSICAL THERAPY ASSISTANT

## 2018-07-05 PROCEDURE — 97110 THERAPEUTIC EXERCISES: CPT | Performed by: PHYSICAL THERAPY ASSISTANT

## 2018-07-05 NOTE — PROGRESS NOTES
Daily Note     Today's date: 2018  Patient name: Rose Mary Galvan  : 1950  MRN: [id number]  Referring provider: Margarita Poe*  Dx:   Encounter Diagnosis     ICD-10-CM    1  S/P right rotator cuff repair Z98 890    2  Tear of right rotator cuff, unspecified tear extent M75 101               Subjective: Pt denies any pain today  Reports she is feeling good  Objective: See treatment diary below    Precautions: HTN, follow Dr Bob Mayer protocol     Daily Treatment Diary      Manuals   7           Gentle PROM Flexion, scaption, IR/ER (IR/ER in plane of scaption) RG  RG  RG  RG  RG RK                                                                                    Exercise Diary               Pendulums NV  10 cw/ccw x 3  10cw/ccw x 3  10 cw/ccw x 3  30x ea CW/CCW 30 ea  cw/ccw            Elbow AROM Flexion/Extension NV  x 20  x 20  x 30  30x AROM elbow 30x  AROM  elbow            Scapular setting isometrics NV  x 20  x 20  x 30  30x ea 30x            AAROM supine  Flexion to 90            2x10                                                                                                                                                            Modalities               Cryo R shoulder 10 min  10 min  10 min  10 min  10 mins               Assessment: Tolerated treatment well  Progressed to AAROM flexion this session per protocol without difficulty  Plan: Continue per plan of care

## 2018-07-10 ENCOUNTER — OFFICE VISIT (OUTPATIENT)
Dept: PHYSICAL THERAPY | Facility: CLINIC | Age: 68
End: 2018-07-10
Payer: MEDICARE

## 2018-07-10 DIAGNOSIS — M75.101 TEAR OF RIGHT ROTATOR CUFF, UNSPECIFIED TEAR EXTENT: Primary | ICD-10-CM

## 2018-07-10 DIAGNOSIS — Z98.890 S/P RIGHT ROTATOR CUFF REPAIR: ICD-10-CM

## 2018-07-10 PROCEDURE — 97140 MANUAL THERAPY 1/> REGIONS: CPT | Performed by: PHYSICAL THERAPIST

## 2018-07-10 NOTE — PROGRESS NOTES
Daily Note     Today's date: 7/10/2018  Patient name: Analisa Watson  : 1950  MRN: [id number]  Referring provider: Noreen Barnhart  Dx:   Encounter Diagnosis     ICD-10-CM    1  Tear of right rotator cuff, unspecified tear extent M75 101    2  S/P right rotator cuff repair Z98 890               Subjective: Pt feels she used shoulder too much yesterday and had a temporary increase in achyness  No pain > 1/10 today  Objective: See treatment diary below  R shoulder PROM is WNL with end range tightness  Precautions: HTN, follow Dr Peyton Baldwin protocol     Daily Treatment Diary      Manuals 6/19  6/22  6/27  6/29  7/2  7/5  7/10         Gentle PROM Flexion, scaption, IR/ER (IR/ER in plane of scaption) RG  RG  RG  RG  RG RK  RG                                                                                  Exercise Diary   6/19 6/22 6/27 6/29  7/2 7/5  7/10          Pendulums NV  10 cw/ccw x 3  10cw/ccw x 3  10 cw/ccw x 3  30x ea CW/CCW 30 ea  cw/ccw  10 cw/ccw x 3          Elbow AROM Flexion/Extension NV  x 20  x 20  x 30  30x AROM elbow 30x  AROM  elbow  X 30          Scapular setting isometrics NV  x 20  x 20  x 30  30x ea 30x  X 30          AAROM supine  Flexion to 90            2x10  As tolerated, 2 x 10, 10 sec hold           AROM scaption             NV           Sub arya FF/EXt/ER/IR/ADD              NV                                                                                                         Modalities     7/10         Cryo R shoulder 10 min  10 min  10 min  10 min  10 mins   10 min            Assessment: Tolerated treatment well  PROM is excellent  Will progress to more AROM and gentle strengthening as per protocol at 6 week post op [place]  Plan: Continue per plan of care

## 2018-07-12 ENCOUNTER — OFFICE VISIT (OUTPATIENT)
Dept: PHYSICAL THERAPY | Facility: CLINIC | Age: 68
End: 2018-07-12
Payer: MEDICARE

## 2018-07-12 DIAGNOSIS — M75.101 TEAR OF RIGHT ROTATOR CUFF, UNSPECIFIED TEAR EXTENT: Primary | ICD-10-CM

## 2018-07-12 DIAGNOSIS — Z98.890 S/P RIGHT ROTATOR CUFF REPAIR: ICD-10-CM

## 2018-07-12 PROCEDURE — 97110 THERAPEUTIC EXERCISES: CPT | Performed by: PHYSICAL THERAPIST

## 2018-07-12 PROCEDURE — 97140 MANUAL THERAPY 1/> REGIONS: CPT | Performed by: PHYSICAL THERAPIST

## 2018-07-12 NOTE — PROGRESS NOTES
Daily Note     Today's date: 2018  Patient name: Irma Fuller  : 1950  MRN: [id number]  Referring provider: Diogenes Jacobs*  Dx:   Encounter Diagnosis     ICD-10-CM    1  Tear of right rotator cuff, unspecified tear extent M75 101    2  S/P right rotator cuff repair Z98 890               Subjective: Mild intermittent achyness  Overall she is pleased with progress and notes that R shoulder feels better now as compared to immediately preop  Objective: See treatment diary below  R shoulder PROM is WNL with end range tightness    R shoulder AROM elevation in scaption is 90 degrees  Surgical incision site are healed    Precautions: HTN, follow Dr Danni Mujica protocol     Daily Treatment Diary      Manuals 6/19  6/22  6/27  6/29  7/2  7/5  7/10 7/12        Gentle PROM Flexion, scaption, IR/ER (IR/ER in plane of scaption) RG  RG  RG  RG  RG RK  RG  RG                                                                                Exercise Diary   6/19 6/22 6/27 6/29  7/2 7/5  7/10  7/12        Pendulums NV  10 cw/ccw x 3  10cw/ccw x 3  10 cw/ccw x 3  30x ea CW/CCW 30 ea  cw/ccw  10 cw/ccw x 3  D/C        Elbow AROM Flexion/Extension NV  x 20  x 20  x 30  30x AROM elbow 30x  AROM  elbow  X 30   D/C       Scapular setting isometrics NV  x 20  x 20  x 30  30x ea 30x  X 30   x 30       AAROM supine  Flexion to 90            2x10  As tolerated, 2 x 10, 10 sec hold   2 x 10, 10 sec hold        AROM scaption             NV   2 x 10        Sub arya FF/EXt/ER/IR/ADD              NV  5sec, 2 x 10                                                                                                       Modalities  6/19 6/22 6/27 6/29  7/2 7/5   7/10 7/12        Cryo R shoulder 10 min  10 min  10 min  10 min  10 mins   10 min  10 min          Assessment: Since starting therapy pt has made the following progress towards goals:  1) Decreased pain  2) Improved range of motion  3 Improved functional use of R arm     Making good progress, now at 6 week post op [place]  Still with functional limitations as a result of pain, weakness and AROM loss  Next phase of rehab to focus on increasing AROM and strength  Advised patient she is still to limit lifting with R arm, no more than a coffee cup at this time  Plan: Continue per plan of care

## 2018-07-12 NOTE — LETTER
2018    Cory Hou MD  [address] [facility] [address]    Patient: Andres Saha   YOB: 1950   Date of Visit: 2018     Encounter Diagnosis     ICD-10-CM    1  Tear of right rotator cuff, unspecified tear extent M75 101    2  S/P right rotator cuff repair Z98 890        Dear Dr Bre Alexandre:    Please review the attached Plan of Care from [facility] [place] recent visit  Please verify that you agree therapy should continue by signing the attached document and sending it back to our office  If you have any questions or concerns, please don't hesitate to call  Sincerely,    Keren Mitchell, PT      Referring Provider:      I certify that I have read the below Plan of Care and certify the need for these services furnished under this plan of treatment while under my care  Cory Hou MD  [address] [place] [address],[address]: [phone]          Daily Note     Today's date: 2018  Patient name: Andres Saha  : 1950  MRN: [id number]  Referring provider: Renny Looney*  Dx:   Encounter Diagnosis     ICD-10-CM    1  Tear of right rotator cuff, unspecified tear extent M75 101    2  S/P right rotator cuff repair Z98 890               Subjective: Mild intermittent achyness  Overall she is pleased with progress and notes that R shoulder feels better now as compared to immediately preop  Objective: See treatment diary below  R shoulder PROM is WNL with end range tightness    R shoulder AROM elevation in scaption is 90 degrees  Surgical incision site are healed    Precautions: HTN, follow Dr Fletcher Govern protocol     Daily Treatment Diary      Manuals 6/19  6/22  6/27  6/29  7/2  7/5  7/10 7/12        Gentle PROM Flexion, scaption, IR/ER (IR/ER in plane of scaption) RG  RG  RG  RG  RG RK  RG  RG                                                                                Exercise Diary    7/5  7/10  7/12        Pendulums NV  10 cw/ccw x 3  10cw/ccw x 3  10 cw/ccw x 3  30x ea CW/CCW 30 ea  cw/ccw  10 cw/ccw x 3  D/C        Elbow AROM Flexion/Extension NV  x 20  x 20  x 30  30x AROM elbow 30x  AROM  elbow  X 30   D/C       Scapular setting isometrics NV  x 20  x 20  x 30  30x ea 30x  X 30   x 30       AAROM supine  Flexion to 90            2x10  As tolerated, 2 x 10, 10 sec hold   2 x 10, 10 sec hold        AROM scaption             NV   2 x 10        Sub arya FF/EXt/ER/IR/ADD              NV  5sec, 2 x 10                                                                                                       Modalities  6/19 6/22 6/27 6/29  7/2 7/5   7/10 7/12        Cryo R shoulder 10 min  10 min  10 min  10 min  10 mins   10 min  10 min          Assessment: Since starting therapy pt has made the following progress towards goals:  1) Decreased pain  2) Improved range of motion  3 Improved functional use of R arm  Making good progress, now at 6 week post op [place]  Still with functional limitations as a result of pain, weakness and AROM loss  Next phase of rehab to focus on increasing AROM and strength  Advised patient she is still to limit lifting with R arm, no more than a coffee cup at this time  Plan: Continue per plan of care

## 2018-07-16 ENCOUNTER — OFFICE VISIT (OUTPATIENT)
Dept: PHYSICAL THERAPY | Facility: CLINIC | Age: 68
End: 2018-07-16
Payer: MEDICARE

## 2018-07-16 DIAGNOSIS — Z98.890 S/P RIGHT ROTATOR CUFF REPAIR: ICD-10-CM

## 2018-07-16 DIAGNOSIS — M75.101 TEAR OF RIGHT ROTATOR CUFF, UNSPECIFIED TEAR EXTENT: Primary | ICD-10-CM

## 2018-07-16 PROCEDURE — 97110 THERAPEUTIC EXERCISES: CPT | Performed by: PHYSICAL THERAPIST

## 2018-07-16 PROCEDURE — 97140 MANUAL THERAPY 1/> REGIONS: CPT | Performed by: PHYSICAL THERAPIST

## 2018-07-16 NOTE — PROGRESS NOTES
Daily Note     Today's date: 2018  Patient name: Tatiana Dykes  : 1950  MRN: [id number]  Referring provider: Suze Shah  Dx:   Encounter Diagnosis     ICD-10-CM    1  Tear of right rotator cuff, unspecified tear extent M75 101    2  S/P right rotator cuff repair Z98 890               Subjective: Notes 1-2/10 R shoulder pain this morning  Has been doing her home exercises  Objective: See treatment diary below  R shoulder PROM is WNL with end range tightness  R shoulder AROM elevation in scaption is 95 degrees  Surgical incision site are healed    Precautions: HTN, follow Dr Brooks Rosales protocol     Daily Treatment Diary      Manuals 6/19  6/22  6/27  6/29  7/2  7/5  7/10 7/12  7/16      Gentle PROM Flexion, scaption, IR/ER (IR/ER in plane of scaption) RG  RG  RG  RG  RG RK  RG  RG  RG                                                                              Exercise Diary   6/19 6/22 6/27 6/29  7/2 7/5  7/10  7/12   7/16     Pendulums NV  10 cw/ccw x 3  10cw/ccw x 3  10 cw/ccw x 3  30x ea CW/CCW 30 ea  cw/ccw  10 cw/ccw x 3  D/C        Elbow AROM Flexion/Extension NV  x 20  x 20  x 30  30x AROM elbow 30x  AROM  elbow  X 30   D/C       Scapular setting isometrics NV  x 20  x 20  x 30  30x ea 30x  X 30   x 30 X 30      AAROM supine  Flexion to 90            2x10  As tolerated, 2 x 10, 10 sec hold   2 x 10, 10 sec hold 2 x 10  10 sec hold as tolerated       AROM scaption             NV   2 x 10 2 x 10       Sub arya FF/EXt/ER/IR/ADD              NV  5sec, 2 x 10 5sec, 2 x 10                                                                                                      Modalities  6/19 6/22 6/27 6/29  7/2 7/5   7/10 7/12  7/16      Cryo R shoulder 10 min  10 min  10 min  10 min  10 mins   10 min  10 min  10 min        Assessment: Fatigued after AROM activity  Overall making appropriate progress  Plan: Continue per plan of care

## 2018-07-17 DIAGNOSIS — I10 ESSENTIAL HYPERTENSION: ICD-10-CM

## 2018-07-17 RX ORDER — LISINOPRIL AND HYDROCHLOROTHIAZIDE 20; 12.5 MG/1; MG/1
TABLET ORAL
Qty: 90 TABLET | Refills: 1 | Status: SHIPPED | OUTPATIENT
Start: 2018-07-17 | End: 2019-01-15 | Stop reason: SDUPTHER

## 2018-07-19 ENCOUNTER — OFFICE VISIT (OUTPATIENT)
Dept: PHYSICAL THERAPY | Facility: CLINIC | Age: 68
End: 2018-07-19
Payer: MEDICARE

## 2018-07-19 DIAGNOSIS — M75.101 TEAR OF RIGHT ROTATOR CUFF, UNSPECIFIED TEAR EXTENT: Primary | ICD-10-CM

## 2018-07-19 DIAGNOSIS — Z98.890 S/P RIGHT ROTATOR CUFF REPAIR: ICD-10-CM

## 2018-07-19 PROCEDURE — 97110 THERAPEUTIC EXERCISES: CPT | Performed by: PHYSICAL THERAPIST

## 2018-07-19 PROCEDURE — G8990 OTHER PT/OT CURRENT STATUS: HCPCS | Performed by: PHYSICAL THERAPIST

## 2018-07-19 PROCEDURE — 97140 MANUAL THERAPY 1/> REGIONS: CPT | Performed by: PHYSICAL THERAPIST

## 2018-07-19 PROCEDURE — G8991 OTHER PT/OT GOAL STATUS: HCPCS | Performed by: PHYSICAL THERAPIST

## 2018-07-19 NOTE — PROGRESS NOTES
Daily Note     Today's date: 2018  Patient name: Sarah Tuttle  : 1950  MRN: [id number]  Referring provider: Elena Anderson*  Dx:   Encounter Diagnosis     ICD-10-CM    1  Tear of right rotator cuff, unspecified tear extent M75 101    2  S/P right rotator cuff repair Z98 890               Subjective: Since last visit: Followed up with surgeon, reports surgeon was pleased with progress  Was cautioned to limited lifting activities (as I have also advised her) with R arm  No pain > 2/10 over the past 48 hours  Objective: See treatment diary below  Progressed UE strengthening    Precautions: HTN, follow Dr Freida Gao protocol     Daily Treatment Diary      Manuals 6/19  6/22  6/27  6/29  7/2  7/5  7/10 7/12  7/16  7/19   Gentle PROM Flexion, scaption, IR/ER (IR/ER in plane of scaption) RG  RG  RG  RG  RG RK  RG  RG  RG  RG                                                                            Exercise Diary   6/19 6/22 6/27 6/29  7/2 7/5  7/10  7/12   7/16 7/19    Pendulums NV  10 cw/ccw x 3  10cw/ccw x 3  10 cw/ccw x 3  30x ea CW/CCW 30 ea  cw/ccw  10 cw/ccw x 3  D/C        Elbow AROM Flexion/Extension NV  x 20  x 20  x 30  30x AROM elbow 30x  AROM  elbow  X 30   D/C       Scapular setting isometrics NV  x 20  x 20  x 30  30x ea 30x  X 30   x 30 X 30   x 30   AAROM supine  Flexion to 90            2x10  As tolerated, 2 x 10, 10 sec hold   2 x 10, 10 sec hold 2 x 10  10 sec hold as tolerated  2 x 10, 10 sec hold, as toelrated     AROM scaption             NV   2 x 10 2 x 10  2 x 10     Sub arya FF/EXt/ER/IR/ADD              NV  5sec, 2 x 10 5sec, 2 x 10  5sec, 2 x 10    UBE                    3/3     SL ER                   0#, 2 x 10                                                    Modalities    7 7/5   7/10 7/12  7/16  7/19    Cryo R shoulder 10 min  10 min  10 min  10 min  10 mins   10 min  10 min  10 min  10 min      Assessment: Fatigued after AROM activity  Overall making appropriate progress  Plan: Continue per plan of care

## 2018-07-23 ENCOUNTER — OFFICE VISIT (OUTPATIENT)
Dept: PHYSICAL THERAPY | Facility: CLINIC | Age: 68
End: 2018-07-23
Payer: MEDICARE

## 2018-07-23 DIAGNOSIS — M75.101 TEAR OF RIGHT ROTATOR CUFF, UNSPECIFIED TEAR EXTENT: Primary | ICD-10-CM

## 2018-07-23 DIAGNOSIS — Z98.890 S/P RIGHT ROTATOR CUFF REPAIR: ICD-10-CM

## 2018-07-23 PROCEDURE — 97110 THERAPEUTIC EXERCISES: CPT | Performed by: PHYSICAL THERAPIST

## 2018-07-23 PROCEDURE — 97140 MANUAL THERAPY 1/> REGIONS: CPT | Performed by: PHYSICAL THERAPIST

## 2018-07-23 NOTE — PROGRESS NOTES
Daily Note     Today's date: 2018  Patient name: Amaya Hassan  : 1950  MRN: [id number]  Referring provider: Belita Ganser*  Dx:   Encounter Diagnosis     ICD-10-CM    1  Tear of right rotator cuff, unspecified tear extent M75 101    2  S/P right rotator cuff repair Z98 890               Subjective: R shoulder pain minimal over the weekend  Started to use right arm to run vacuum , but then thought better of it and switched to L arm  No pain > 2/10 over the past 48 hours  Objective: See treatment diary below  Progressed periscapular strengthening    Precautions: HTN, follow Dr Dae Haynes protocol     Daily Treatment Diary      Manuals             Gentle PROM Flexion, scaption, IR/ER (IR/ER in plane of scaption) RG                                                         Exercise Diary               Tband Rows Pink, 2 x 10, 3 sec                         Wand AA ER with wand (towel roll under arm) 10 sec x 20            AAROM supine  Flexion  with Wand 10 sec, 2 x 10              AROM scaption 2 x 10              Sub arya FF/EXt/ER/IR/ADD 5sec  X 20 each             UBE  3/3              SL ER 0# 3 x 10             SA punchups  0#, 2 x 10                            Modalities              Cryo R shoulder 10 min              Assessment: Did well with progressions  Functional AROM improving  Plan: Continue per plan of care  Continue per protocol  No tband IR/ER until 10 weeks post op per protocol

## 2018-07-26 ENCOUNTER — OFFICE VISIT (OUTPATIENT)
Dept: PHYSICAL THERAPY | Facility: CLINIC | Age: 68
End: 2018-07-26
Payer: MEDICARE

## 2018-07-26 DIAGNOSIS — M75.101 TEAR OF RIGHT ROTATOR CUFF, UNSPECIFIED TEAR EXTENT: Primary | ICD-10-CM

## 2018-07-26 DIAGNOSIS — Z98.890 S/P RIGHT ROTATOR CUFF REPAIR: ICD-10-CM

## 2018-07-26 PROCEDURE — 97140 MANUAL THERAPY 1/> REGIONS: CPT | Performed by: PHYSICAL THERAPIST

## 2018-07-26 PROCEDURE — 97110 THERAPEUTIC EXERCISES: CPT | Performed by: PHYSICAL THERAPIST

## 2018-07-26 NOTE — PROGRESS NOTES
Daily Note     Today's date: 2018  Patient name: Obinna Singh  : 1950  MRN: [id number]  Referring provider: Augustin Restrepo  Dx:            Subjective: Concerned whether she will be able to get back to full aerobic work outs s/p rehab  Pt is encouraged she is progressing so well but discouraged the process takes so long  Objective: See treatment diary below  Precautions: HTN, follow Dr Ashleigh White protocol     Daily Treatment Diary      Manuals            Gentle PROM Flexion, scaption, IR/ER (IR/ER in plane of scaption) RG RZ                                                        Exercise Diary              Tband Rows Pink, 2 x 10, 3 sec Pink, 2 x 10, 3 sec                        Wand AA ER with wand (towel roll under arm) 10 sec x 20 10 sec x 20           AAROM supine  Flexion  with Wand 10 sec, 2 x 10  10 sec, 2 x 10             AROM scaption 2 x 10  2 x 10             Sub arya FF/EXt/ER/IR/ADD 5sec  X 20 each  5sec  X 20 each            UBE  3/3  3/3             SL ER 0# 3 x 10  0# 3 x 10           SA punchups  0#, 2 x 10  0#, 2 x 10                          Modalities             Cryo R shoulder 10 min declined             Assessment: Pt demonstrated no limitation with progressions from previous visit  Pt was able to add s/l ER to HEP due to ease and quality in clinic  Isometrics were progressed to include elbow extension on alternating days at home maximize strength gains with isometrics  Plan: Continue per plan of care  Continue per protocol  No tband IR/ER until 10 weeks post op per protocol

## 2018-07-30 ENCOUNTER — OFFICE VISIT (OUTPATIENT)
Dept: PHYSICAL THERAPY | Facility: CLINIC | Age: 68
End: 2018-07-30
Payer: MEDICARE

## 2018-07-30 DIAGNOSIS — Z98.890 S/P RIGHT ROTATOR CUFF REPAIR: ICD-10-CM

## 2018-07-30 DIAGNOSIS — M75.101 TEAR OF RIGHT ROTATOR CUFF, UNSPECIFIED TEAR EXTENT: Primary | ICD-10-CM

## 2018-07-30 PROCEDURE — 97140 MANUAL THERAPY 1/> REGIONS: CPT | Performed by: PHYSICAL THERAPIST

## 2018-07-30 PROCEDURE — 97150 GROUP THERAPEUTIC PROCEDURES: CPT | Performed by: PHYSICAL THERAPIST

## 2018-07-30 PROCEDURE — 97110 THERAPEUTIC EXERCISES: CPT | Performed by: PHYSICAL THERAPIST

## 2018-07-30 NOTE — PROGRESS NOTES
Daily Note     Today's date: 2018  Patient name: Grady Landaverde  : 1950  MRN: [id number]  Referring provider: Antoine Terry*  Dx:            Subjective: Denies any true shoulder pain over the weekend  Shoulder feeling stiff in the AM     Objective: See treatment diary below  Progressed UE strengthening    Precautions: HTN, follow Dr Genna Toussaint protocol     Daily Treatment Diary      Manuals           Gentle PROM Flexion, scaption, IR/ER (IR/ER in plane of scaption) RG RZ RG                                                       Exercise Diary             Tband Rows Pink, 2 x 10, 3 sec Pink, 2 x 10, 3 sec Pink, 2 x 15, 3 sec          Tband shoulder extension   Pink, 2 x 10, 3 sec          Wand AA ER with wand (towel roll under arm) 10 sec x 20 10 sec x 20 10 sec x 10          AAROM supine  Flexion  with Wand 10 sec, 2 x 10  10 sec, 2 x 10   AROM abduction, 2 x 10           AROM scaption 2 x 10  2 x 10  2 x 10           Sub arya FF/EXt/ER/IR/ADD 5sec  X 20 each  5sec  X 20 each  IR/ER, 5sec, 2 x 10          UBE  3/3  3/3  3/3           SL ER 0# 3 x 10  0# 3 x 10 1#, 2 x 10          SA punchups  0#, 2 x 10  0#, 2 x 10 1#, 2 x 10          S L shoulder flexion/extension(slowly)     0#, 2 x 10          Modalities            Cryo R shoulder 10 min declined 10 min            Assessment: Fatigued with progressions  ROM, strength coming along well leading to improved functional use of R arm for ADLs  Plan: Continue per plan of care  Continue per protocol  No tband IR/ER until 10 weeks post op per protocol  (she is currently 8 weeks, 4 days out)

## 2018-08-02 ENCOUNTER — OFFICE VISIT (OUTPATIENT)
Dept: PHYSICAL THERAPY | Facility: CLINIC | Age: 68
End: 2018-08-02
Payer: MEDICARE

## 2018-08-02 DIAGNOSIS — Z98.890 S/P RIGHT ROTATOR CUFF REPAIR: ICD-10-CM

## 2018-08-02 DIAGNOSIS — M75.101 TEAR OF RIGHT ROTATOR CUFF, UNSPECIFIED TEAR EXTENT: Primary | ICD-10-CM

## 2018-08-02 PROCEDURE — 97110 THERAPEUTIC EXERCISES: CPT | Performed by: PHYSICAL THERAPIST

## 2018-08-02 PROCEDURE — 97140 MANUAL THERAPY 1/> REGIONS: CPT | Performed by: PHYSICAL THERAPIST

## 2018-08-02 NOTE — PROGRESS NOTES
Daily Note     Today's date: 2018  Patient name: Leesa Rahman  : 1950  MRN: [id number]  Referring provider: Johanny Canseco*  Dx:            Subjective: Feels R shoulder strength is improving  Having much less pain now a compared to immediately preop  Objective: See treatment diary below  Progressed UE strengthening  PROM full today for all except IR: 60 degrees  Has tightness at all end ranges of motion      Precautions: HTN, follow Dr Des Marcelino protocol     Daily Treatment Diary      Manuals  82         Gentle PROM Flexion, scaption, IR/ER (IR/ER in plane of scaption) RG RZ RG RG                                                      Exercise Diary    8         Tband Rows Pink, 2 x 10, 3 sec Pink, 2 x 10, 3 sec Pink, 2 x 15, 3 sec Pink, 2 x 15, 3 sec         Tband shoulder extension   Pink, 2 x 10, 3 sec Pink, 2 x 15, 3 sec         Wand AA ER with wand (towel roll under arm) 10 sec x 20 10 sec x 20 10 sec x 10 D/C  Pulley flexion: 10 sec hold, x 5 min         AAROM supine  Flexion  with Wand 10 sec, 2 x 10  10 sec, 2 x 10   AROM abduction, 2 x 10 AROM ABD, 2 x 10          AROM scaption 2 x 10  2 x 10  2 x 10 2 x 12          Sub arya FF/EXt/ER/IR/ADD 5sec  X 20 each  5sec  X 20 each  IR/ER, 5sec, 2 x 10 IR/ER< 5sec, x 20         UBE  3/3  3/3  3/3 3/3          SL ER 0# 3 x 10  0# 3 x 10 1#, 2 x 10 1#, 3 x 10         SA punchups  0#, 2 x 10  0#, 2 x 10 1#, 2 x 10 1#, 2 x 20         S L shoulder flexion/extension(slowly)     0#, 2 x 10 0#, 2 x 10         Modalities   8         Cryo R shoulder 10 min declined 10 min 10 min           Assessment: Fatigued with progressions  ROM, strength coming along well leading to improved functional use of R arm for ADLs  Plan: Continue per plan of care  Continue per protocol  No tband IR/ER until 10 weeks post op per protocol   18)

## 2018-08-06 ENCOUNTER — OFFICE VISIT (OUTPATIENT)
Dept: PHYSICAL THERAPY | Facility: CLINIC | Age: 68
End: 2018-08-06
Payer: MEDICARE

## 2018-08-06 DIAGNOSIS — Z98.890 S/P RIGHT ROTATOR CUFF REPAIR: ICD-10-CM

## 2018-08-06 DIAGNOSIS — M75.101 TEAR OF RIGHT ROTATOR CUFF, UNSPECIFIED TEAR EXTENT: Primary | ICD-10-CM

## 2018-08-06 PROCEDURE — 97140 MANUAL THERAPY 1/> REGIONS: CPT | Performed by: PHYSICAL THERAPIST

## 2018-08-06 PROCEDURE — 97150 GROUP THERAPEUTIC PROCEDURES: CPT | Performed by: PHYSICAL THERAPIST

## 2018-08-06 NOTE — PROGRESS NOTES
Daily Note     Today's date: 2018  Patient name: Tami Vasquez  : 1950  MRN: [id number]  Referring provider: Nell Fuentes*  Dx:            Subjective: Started with an increase in R anterior shoulder pain over the weekend,she is not sure why this is  Pain onset Saturday afternoon (6/10) still present, but not as bad today (3/10)  Objective: See treatment diary below  PROM full today for all except IR: 65 degrees  Empty Can  + on R  Modified therex to address increased shoulder pain  Precautions: HTN, follow Dr Mary Chaidez protocol     Daily Treatment Diary      Manuals         Gentle PROM Flexion, scaption, IR/ER (IR/ER in plane of scaption) RG RZ RG RG RG                                                     Exercise Diary           Tband Rows Pink, 2 x 10, 3 sec Pink, 2 x 10, 3 sec Pink, 2 x 15, 3 sec Pink, 2 x 15, 3 sec Pink, 2 x 10, 3 sec        Tband shoulder extension   Pink, 2 x 10, 3 sec Pink, 2 x 15, 3 sec Yellow, 2 x 10, 3 sec        Wand AA ER with wand (towel roll under arm) 10 sec x 20 10 sec x 20 10 sec x 10 D/C  Pulley flexion: 10 sec hold, x 5 min Pulley flexion, 10 sec hold, x 5 min        AAROM supine  Flexion  with Wand 10 sec, 2 x 10  10 sec, 2 x 10   AROM abduction, 2 x 10 AROM ABD, 2 x 10 AROM ABD, NP         AROM scaption 2 x 10  2 x 10  2 x 10 2 x 12 2  X 12         Sub arya FF/EXt/ER/IR/ADD 5sec  X 20 each  5sec  X 20 each  IR/ER, 5sec, 2 x 10 IR/ER< 5sec, x 20 IR/ER, 5sec,2 x 10        UBE  3/3  3/3  3/3 3/3 3/3         SL ER 0# 3 x 10  0# 3 x 10 1#, 2 x 10 1#, 3 x 10 1#, 3 x 10        SA punchups  0#, 2 x 10  0#, 2 x 10 1#, 2 x 10 1#, 2 x 20 13, 2 x 20        SL R shoulder flexion/extension(slowly)     0#, 2 x 10 0#, 2 x 10 0#, 2 x 10        Modalities   8        Cryo R shoulder 10 min declined 10 min 10 min 10 min          Assessment: Mild increase in R shoulder pain since last visit   Unsure why, but it is improving over the past 48 hours  Did well with all therex today, encouraged use of ice and limited lifting with R arm  Plan: Continue per plan of care  Continue per protocol  No tband IR/ER until 10 weeks post op per protocol   8/9/18)

## 2018-08-09 ENCOUNTER — APPOINTMENT (OUTPATIENT)
Dept: PHYSICAL THERAPY | Facility: CLINIC | Age: 68
End: 2018-08-09
Payer: MEDICARE

## 2018-08-10 ENCOUNTER — OFFICE VISIT (OUTPATIENT)
Dept: PHYSICAL THERAPY | Facility: CLINIC | Age: 68
End: 2018-08-10
Payer: MEDICARE

## 2018-08-10 DIAGNOSIS — Z98.890 S/P RIGHT ROTATOR CUFF REPAIR: ICD-10-CM

## 2018-08-10 DIAGNOSIS — M75.101 TEAR OF RIGHT ROTATOR CUFF, UNSPECIFIED TEAR EXTENT: Primary | ICD-10-CM

## 2018-08-10 PROCEDURE — 97110 THERAPEUTIC EXERCISES: CPT | Performed by: PHYSICAL THERAPIST

## 2018-08-10 PROCEDURE — 97140 MANUAL THERAPY 1/> REGIONS: CPT | Performed by: PHYSICAL THERAPIST

## 2018-08-10 NOTE — PROGRESS NOTES
Daily Note     Today's date: 8/10/2018  Patient name: Gabby Tony  : 1950  MRN: [id number]  Referring provider: Jessa Rosenberg*  Dx: s/p R RC repair           Subjective: Pain improved since last visit in R shoulder  No more than 2/10 pain today, 0/10 upon entry into clinic  Objective: See treatment diary below  Progressed strengthening program as per protocol  Precautions: HTN, follow Dr Ariel Herrera protocol     Daily Treatment Diary       Manuals 8/10            PROM  R shoulder (No abduction, work in scaption) EV            Manual posterior capsule stertch EV                                      Exercise Diary              UBE 3/3            Pulley flexion 5 min, 10 sec hold            AROM scaption 2 x 15            AROM ABD 2 x 10            Tband R0ws Pink, 2 x 15, 3 sec            Tband Extension Pink, 2 x 15, 3 sec            Tband IR Pink, 3 x 10            Tband ER Yellow, 2 x 10            Ball circles at wall 20 cw/ccw x 3            SL ER 1#, 3 x 10            SL shoulder flexion/extension (slowly) 0#, 2 x 10            SA punchups 1#, 2 x 20                                                                                                                                              Modalities             Cryo R shoulder 10 min                                   Assessment: Did well with progressions; appropriately fatigued  Plan: Continue per plan of care  Continue per protocol

## 2018-08-13 ENCOUNTER — APPOINTMENT (OUTPATIENT)
Dept: PHYSICAL THERAPY | Facility: CLINIC | Age: 68
End: 2018-08-13
Payer: MEDICARE

## 2018-08-14 ENCOUNTER — OFFICE VISIT (OUTPATIENT)
Dept: PHYSICAL THERAPY | Facility: CLINIC | Age: 68
End: 2018-08-14
Payer: MEDICARE

## 2018-08-14 DIAGNOSIS — Z98.890 S/P RIGHT ROTATOR CUFF REPAIR: ICD-10-CM

## 2018-08-14 DIAGNOSIS — M75.101 TEAR OF RIGHT ROTATOR CUFF, UNSPECIFIED TEAR EXTENT: Primary | ICD-10-CM

## 2018-08-14 PROCEDURE — 97110 THERAPEUTIC EXERCISES: CPT | Performed by: PHYSICAL THERAPIST

## 2018-08-14 PROCEDURE — 97140 MANUAL THERAPY 1/> REGIONS: CPT | Performed by: PHYSICAL THERAPIST

## 2018-08-14 NOTE — PROGRESS NOTES
Daily Note     Today's date: 2018  Patient name: Chrystie Snellen  : 1950  MRN: [id number]  Referring provider: Hugo Hogan*  Dx: s/p R RC repair           Subjective: R shoulder starting to feel stronger  Feeling less AM stiffness  Objective: Rx as per flow sheet below  Progressed periscapular strengthening  Mild tightness into PROM ER (85 degrees)  Precautions: HTN, follow Dr Quentin Benoit protocol     Daily Treatment Diary       Manuals 8/10 8/14           PROM  R shoulder (No abduction, work in scaption) EV RG           Manual posterior capsule stertch EV RG                                     Exercise Diary              UBE 3/3 3/3, L2 5           Pulley flexion 5 min, 10 sec hold 5 min, 10 sec hold           AROM scaption 2 x 15 2 x 15           AROM ABD 2 x 10 3 x 10           Tband R0ws Pink, 2 x 15, 3 sec Pink, 2 x 15           Tband Extension Pink, 2 x 15, 3 sec Pink, 2 x 15           Tband IR Pink, 3 x 10 Pink, 2 x 15           Tband ER Yellow, 2 x 10 Yellow, 2 x 15           Ball circles at wall 20 cw/ccw x 3 20 cw/ccw x 3           SL ER 1#, 3 x 10 1#3 x 10 2#          SL shoulder flexion/extension (slowly) 0#, 2 x 10 0#, 2 x 10           SA punchups 1#, 2 x 20 2# 2 x 20           Prone rhomboids  0#, 2 x 10           Prone lower traps  0#, 2 x 10           Prone extension  0#, 2 x 10                                                                                                      Modalities             Cryo R shoulder 10 min 10 min                                  Assessment: Excellent progress now approaching 11 weeks post op  Needs to build more functional strength  Plan: Continue per plan of care  Continue per protocol

## 2018-08-16 ENCOUNTER — OFFICE VISIT (OUTPATIENT)
Dept: PHYSICAL THERAPY | Facility: CLINIC | Age: 68
End: 2018-08-16
Payer: MEDICARE

## 2018-08-16 DIAGNOSIS — Z98.890 S/P RIGHT ROTATOR CUFF REPAIR: ICD-10-CM

## 2018-08-16 DIAGNOSIS — M75.101 TEAR OF RIGHT ROTATOR CUFF, UNSPECIFIED TEAR EXTENT: Primary | ICD-10-CM

## 2018-08-16 PROCEDURE — 97110 THERAPEUTIC EXERCISES: CPT | Performed by: PHYSICAL THERAPIST

## 2018-08-16 PROCEDURE — 97140 MANUAL THERAPY 1/> REGIONS: CPT | Performed by: PHYSICAL THERAPIST

## 2018-08-16 NOTE — PROGRESS NOTES
Daily Note     Today's date: 2018  Patient name: Sana Dunne  : 1950  MRN: [id number]  Referring provider: Lynn Banuelos*  Dx: s/p R RC repair           Subjective: Feeling markedly better this week  Less pain and stiffness out of bed , greater ease with motion demonstrated as abduction  Objective: Rx as per flow sheet below  Progressed rotator and periscapular strengthening    Precautions: HTN, follow Dr Kyle Jean protocol     Daily Treatment Diary       Manuals 8/10 8/14 8/16          PROM  R shoulder (No abduction, work in scaption) EV RG RG          Manual posterior capsule stertch EV RG RG                                    Exercise Diary              UBE 3/3 3/3, L2 5 3/3, L 3 0          Pulley flexion 5 min, 10 sec hold 5 min, 10 sec hold 5 min, 10 sec hold          AROM scaption 2 x 15 2 x 15 2 x 15          AROM ABD 2 x 10 3 x 10 3 x 10          Tband R0ws Pink, 2 x 15, 3 sec Pink, 2 x 15 Green, 2 x15          Tband Extension Pink, 2 x 15, 3 sec Pink, 2 x 15 Green, 2 x 10          Tband IR Pink, 3 x 10 Pink, 2 x 15 Green, 2 x 10          Tband ER Yellow, 2 x 10 Yellow, 2 x 15 Pink, 2x 10          Ball circles at wall 20 cw/ccw x 3 20 cw/ccw x 3 20 cw/ccw x 2          SL ER 1#, 3 x 10 1#3 x 10 2#, 3 x 10          SL shoulder flexion/extension (slowly) 0#, 2 x 10 0#, 2 x 10 0#, 2 x 15          SA punchups 1#, 2 x 20 2# 2 x 20 2#, 2 x 20          Prone rhomboids  0#, 2 x 10 0#, 2 x 10          Prone lower traps  0#, 2 x 10 0+, 2 x 10          Prone extension  0#, 2 x 10 2#, 2 x 10                                                                                                     Modalities             Cryo R shoulder 10 min 10 min 10 min                                 Assessment: 12 weeks post op today  Good progress  Final phase of therapy to work on maximizing functional strength and motion  Plan: Continue per plan of care  Continue per protocol

## 2018-08-20 ENCOUNTER — OFFICE VISIT (OUTPATIENT)
Dept: PHYSICAL THERAPY | Facility: CLINIC | Age: 68
End: 2018-08-20
Payer: MEDICARE

## 2018-08-20 DIAGNOSIS — Z98.890 S/P RIGHT ROTATOR CUFF REPAIR: ICD-10-CM

## 2018-08-20 DIAGNOSIS — M75.101 TEAR OF RIGHT ROTATOR CUFF, UNSPECIFIED TEAR EXTENT: Primary | ICD-10-CM

## 2018-08-20 PROCEDURE — 97140 MANUAL THERAPY 1/> REGIONS: CPT | Performed by: PHYSICAL THERAPIST

## 2018-08-20 PROCEDURE — 97110 THERAPEUTIC EXERCISES: CPT | Performed by: PHYSICAL THERAPIST

## 2018-08-20 NOTE — PROGRESS NOTES
Daily Note     Today's date: 2018  Patient name: Luis M Collins  : 1950  MRN: [id number]  Referring provider: Salome Zamudio  Dx: s/p R RC repair           Subjective: Pt reports no new complaints or changes since last session  She reports continued improvement with her motion and function; denies pain prior to session today  Objective: Rx as per flow sheet below       Precautions: HTN, follow Dr Radha Marks protocol     Daily Treatment Diary       Manuals 8/10 8/14 8/16 8/20         PROM  R shoulder (No abduction, work in scaption) EV EMILY DIAZ MD         Manual posterior capsule stertch EV EMILY DIAZ MD                                   Exercise Diary              UBE 3/3 3/3, L2 5 3/3, L 3 0 3/3, L 3 0         Pulley flexion 5 min, 10 sec hold 5 min, 10 sec hold 5 min, 10 sec hold 5 min, 10 sec hold         AROM scaption 2 x 15 2 x 15 2 x 15 2x15         AROM ABD 2 x 10 3 x 10 3 x 10 3x10         Tband R0ws Pink, 2 x 15, 3 sec Pink, 2 x 15 Green, 2 x15 Green, 2 x15         Tband [employer], 2 x 15, 3 sec Pink, 2 x 15 Green, 2 x 10 Green, 2 x15         Tband IR Pink, 3 x 10 Pink, 2 x 15 Green, 2 x 10 Green, 2 x15         Tband ER Yellow, 2 x 10 Yellow, 2 x 15 Pink, 2x 10 Pink, 2 x15         Ball circles at wall 20 cw/ccw x 3 20 cw/ccw x 3 20 cw/ccw x 2 20 cw/ccw x 2         SL ER 1#, 3 x 10 1#3 x 10 2#, 3 x 10 2#  3x10         SL shoulder flexion/extension (slowly) 0#, 2 x 10 0#, 2 x 10 0#, 2 x 15 0#  2x15         SA punchups 1#, 2 x 20 2# 2 x 20 2#, 2 x 20 2#  2x20         Prone rhomboids  0#, 2 x 10 0#, 2 x 10 0#  2x10         Prone lower traps  0#, 2 x 10 0+, 2 x 10 0#  2x10         Prone extension  0#, 2 x 10 2#, 2 x 10 2#  2x10                                   Modalities             Cryo R shoulder 10 min 10 min 10 min 10 min                                Assessment: Pt demonstrated good overall tolerance to progression of program reporting mild fatigue and no increase in pain with completion of session  Pt noted most challenge with prone scapular strengthening series  Pt will benefit from continued skilled PT intervention in order to address her remaining limitations and to restore maximal function  Plan: Continue per plan of care  Continue per protocol

## 2018-08-23 ENCOUNTER — OFFICE VISIT (OUTPATIENT)
Dept: PHYSICAL THERAPY | Facility: CLINIC | Age: 68
End: 2018-08-23
Payer: MEDICARE

## 2018-08-23 DIAGNOSIS — M75.101 TEAR OF RIGHT ROTATOR CUFF, UNSPECIFIED TEAR EXTENT: Primary | ICD-10-CM

## 2018-08-23 DIAGNOSIS — Z98.890 S/P RIGHT ROTATOR CUFF REPAIR: ICD-10-CM

## 2018-08-23 PROCEDURE — 97110 THERAPEUTIC EXERCISES: CPT | Performed by: PHYSICAL THERAPY ASSISTANT

## 2018-08-23 PROCEDURE — 97140 MANUAL THERAPY 1/> REGIONS: CPT | Performed by: PHYSICAL THERAPY ASSISTANT

## 2018-08-23 NOTE — PROGRESS NOTES
Daily Note     Today's date: 2018  Patient name: Tami Vasquez  : 1950  MRN: [id number]  Referring provider: Nell Fuentes*  Dx: s/p R RC repair           Subjective: Pt reports no new complaints or changes since last session  She reports continued improvement with her motion and function; denies pain prior to session today  Objective: Rx as per flow sheet below       Precautions: HTN, follow Dr Mary Chaidez protocol     Daily Treatment Diary       Manuals 8/10 8/14 8/16 8/20 8/23        PROM  R shoulder (No abduction, work in scaption) EV EMILY BUTTS        Manual posterior capsule stertch EV EMILY BUTTS                                  Exercise Diary              UBE 3/3 3/3, L2 5 3/3, L 3 0 3/3, L 3 0 3'/3'          Pulley flexion 5 min, 10 sec hold 5 min, 10 sec hold 5 min, 10 sec hold 5 min, 10 sec hold 5"  10" hold        AROM scaption 2 x 15 2 x 15 2 x 15 2x15 2x15        AROM ABD 2 x 10 3 x 10 3 x 10 3x10 3x10        Tband R0ws Pink, 2 x 15, 3 sec Pink, 2 x 15 Green, 2 x15 Green, 2 x15 gtb  2x15        [employer], 2 x 15, 3 sec Pink, 2 x 15 Green, 2 x 10 Green, 2 x15 gtb  2x15        Tband IR Pink, 3 x 10 Pink, 2 x 15 Green, 2 x 10 Green, 2 x15 gtb  2x15        Tband ER Yellow, 2 x 10 Yellow, 2 x 15 Pink, 2x 10 Pink, 2 x15 gtb  Salvador Dasha circles at wall 20 cw/ccw x 3 20 cw/ccw x 3 20 cw/ccw x 2 20 cw/ccw x 2 2x20  cw/ccw        SL ER 1#, 3 x 10 1#3 x 10 2#, 3 x 10 2#  3x10 2#  3x10        SL shoulder flexion/extension (slowly) 0#, 2 x 10 0#, 2 x 10 0#, 2 x 15 0#  2x15 0#  2x15        SA punchups 1#, 2 x 20 2# 2 x 20 2#, 2 x 20 2#  2x20 2#  2x20        Prone rhomboids  0#, 2 x 10 0#, 2 x 10 0#  2x10 0#  2x10        Prone lower traps  0#, 2 x 10 0+, 2 x 10 0#  2x10 0#  2x10        Prone extension  0#, 2 x 10 2#, 2 x 10 2#  2x10 2#  2x10                                  Modalities             Cryo R shoulder 10 min 10 min 10 min 10 min 10' Assessment: Pt demonstrated good overall tolerance to progression of program reporting mild fatigue and no increase in pain withTE as noted  Pt noted most challenge with prone scapular strengthening series and required verbal cues for good technique    Pt will benefit from continued skilled PT intervention in order to address her remaining limitations and to restore maximal function  Plan: Continue per plan of care  Continue per protocol

## 2018-08-27 ENCOUNTER — OFFICE VISIT (OUTPATIENT)
Dept: PHYSICAL THERAPY | Facility: CLINIC | Age: 68
End: 2018-08-27
Payer: MEDICARE

## 2018-08-27 DIAGNOSIS — Z98.890 S/P RIGHT ROTATOR CUFF REPAIR: ICD-10-CM

## 2018-08-27 DIAGNOSIS — M75.101 TEAR OF RIGHT ROTATOR CUFF, UNSPECIFIED TEAR EXTENT: Primary | ICD-10-CM

## 2018-08-27 PROCEDURE — G8991 OTHER PT/OT GOAL STATUS: HCPCS | Performed by: PHYSICAL THERAPIST

## 2018-08-27 PROCEDURE — G8990 OTHER PT/OT CURRENT STATUS: HCPCS | Performed by: PHYSICAL THERAPIST

## 2018-08-27 PROCEDURE — 97140 MANUAL THERAPY 1/> REGIONS: CPT | Performed by: PHYSICAL THERAPIST

## 2018-08-27 PROCEDURE — 97110 THERAPEUTIC EXERCISES: CPT | Performed by: PHYSICAL THERAPIST

## 2018-08-27 NOTE — PROGRESS NOTES
"Requested Prescriptions   Pending Prescriptions Disp Refills     torsemide (DEMADEX) 20 MG tablet [Pharmacy Med Name: TORSEMIDE 20MG TABLETS]  Last Written Prescription Date:  2/14/18  Last Fill Quantity: 30,  # refills: 0   Last office visit: 7/2/2018 with prescribing provider:  Jorge   Future Office Visit:     30 tablet 0     Sig: TAKE 1 TABLET(20 MG) BY MOUTH DAILY    Diuretics (Including Combos) Protocol Failed    7/31/2018  4:52 PM       Failed - Normal serum creatinine on file in past 12 months    Recent Labs   Lab Test  05/18/18   1409   CR  1.75*             Passed - Blood pressure under 140/90 in past 12 months    BP Readings from Last 3 Encounters:   07/16/18 132/68   07/02/18 128/66   06/23/18 100/60                Passed - Recent (12 mo) or future (30 days) visit within the authorizing provider's specialty    Patient had office visit in the last 12 months or has a visit in the next 30 days with authorizing provider or within the authorizing provider's specialty.  See \"Patient Info\" tab in inbasket, or \"Choose Columns\" in Meds & Orders section of the refill encounter.           Passed - Patient is age 18 or older       Passed - Normal serum potassium on file in past 12 months    Recent Labs   Lab Test  05/18/18   1409   POTASSIUM  4.2                   Passed - Normal serum sodium on file in past 12 months    Recent Labs   Lab Test  05/18/18   1409   NA  141                " Daily Note     Today's date: 2018  Patient name: Lalo Kong  : 1950  MRN: [id number]  Referring provider: Jong Estrada  Dx: s/p R RC repair    Start Time: 830  Stop Time: 935  Total time in clinic (min): 65 minutesSubjective: Feeling stronger with lifting activities  Denies pain upon entry into clinic today  Objective: Rx as per flow sheet below  FOTO self rated functional score improved to 62 from 41 at time of IE      Precautions: HTN, follow Dr Mya Pal protocol     Daily Treatment Diary       Manuals 8/10 8/14 8/16 8/20 8/23 8/27       PROM  R shoulder (No abduction, work in scaption) EV EMILY DIAZ       Manual posterior capsule stertch EV EMILY DIAZ                                 Exercise Diary              UBE 3/3 3/3, L2 5 3/3, L 3 0 3/3, L 3 0 3'/3'   3/3       Pulley flexion 5 min, 10 sec hold 5 min, 10 sec hold 5 min, 10 sec hold 5 min, 10 sec hold 5"  10" hold 6 min, 10 sec hold       AROM scaption 2 x 15 2 x 15 2 x 15 2x15 2x15 1#, 2 x 10       AROM ABD 2 x 10 3 x 10 3 x 10 3x10 3x10 2 x 10       Tband R0ws Pink, 2 x 15, 3 sec Pink, 2 x 15 Green, 2 x15 Green, 2 x15 gtb  2x15 NP       [employer], 2 x 15, 3 sec Pink, 2 x 15 Green, 2 x 10 Green, 2 x15 gtb  2x15 NP       Tband IR Pink, 3 x 10 Pink, 2 x 15 Green, 2 x 10 Green, 2 x15 gtb  TXU Coni, 2 x 15       Tband ER Yellow, 2 x 10 Yellow, 2 x 15 Pink, 2x 10 Pink, 2 x15 gtb  2x15 Green, 2 x 15       Ball circles at wall 20 cw/ccw x 3 20 cw/ccw x 3 20 cw/ccw x 2 20 cw/ccw x 2 2x20  cw/ccw 20 cw/ccw x 4       SL ER 1#, 3 x 10 1#3 x 10 2#, 3 x 10 2#  3x10 2#  3x10 2#, 3 x 10       SL shoulder flexion/extension (slowly) 0#, 2 x 10 0#, 2 x 10 0#, 2 x 15 0#  2x15 0#  2x15 1#, 2 x 10       SA punchups 1#, 2 x 20 2# 2 x 20 2#, 2 x 20 2#  2x20 2#  2x20 2#, 2 x 20       Prone rhomboids  0#, 2 x 10 0#, 2 x 10 0#  2x10 0#  2x10 0#, 2  10       Prone lower traps  0#, 2 x 10 0+, 2 x 10 0#  2x10 0#  2x10 0#, 2 x 10 Prone extension  0#, 2 x 10 2#, 2 x 10 2#  2x10 2#  2x10 3#, 2 x 10       Med ball up and out lift      2 2#, 2 x 10       Rhythmic Stabilizations FF/Ext, HABd/HADd @ 90 degrees      3 x 10       Modalities             Cryo R shoulder 10 min 10 min 10 min 10 min 10' 10 min                              Assessment: Fatigued with progression of functional lifting   Pt will benefit from continued skilled PT intervention in order to address her remaining limitations and to restore maximal function  Plan: Continue per plan of care  Continue per protocol

## 2018-08-30 ENCOUNTER — OFFICE VISIT (OUTPATIENT)
Dept: PHYSICAL THERAPY | Facility: CLINIC | Age: 68
End: 2018-08-30
Payer: MEDICARE

## 2018-08-30 DIAGNOSIS — M75.101 TEAR OF RIGHT ROTATOR CUFF, UNSPECIFIED TEAR EXTENT: Primary | ICD-10-CM

## 2018-08-30 DIAGNOSIS — Z98.890 S/P RIGHT ROTATOR CUFF REPAIR: ICD-10-CM

## 2018-08-30 PROCEDURE — 97110 THERAPEUTIC EXERCISES: CPT

## 2018-08-30 PROCEDURE — 97112 NEUROMUSCULAR REEDUCATION: CPT

## 2018-08-30 PROCEDURE — 97140 MANUAL THERAPY 1/> REGIONS: CPT

## 2018-08-30 NOTE — PROGRESS NOTES
Daily Note     Today's date: 2018  Patient name: Sarah Tuttle  : 1950  MRN: [id number]  Referring provider: Elena Anderson*  Dx:   Encounter Diagnosis     ICD-10-CM    1  Tear of right rotator cuff, unspecified tear extent M75 101    2  S/P right rotator cuff repair Z98 890               Subjective: Pt reports significant increase in mobility and decreased pain over the past week      Objective: See treatment diary below    Precautions: HTN, follow Dr Freida Gao protocol     Daily Treatment Diary   Manuals 8/10 8/14 8/16 8/20 8/23 8/27 8/31      PROM  R shoulder (No abduction, work in scaption) EV EMILY DIAZ MD RK RG SH      Manual posterior capsule stertch EV EMILY DIAZ MD RK RG [address]                                Exercise Diary  8/10 8/14 8/16 8/20 8/23 8/27 8/31      UBE 3/3 3/3, L2 5 3/3, L 3 0 3/3, L 3 0 3'/3'   3/3 3' ea      Pulley flexion 5 min, 10 sec hold 5 min, 10 sec hold 5 min, 10 sec hold 5 min, 10 sec hold 5"  10" hold 3 min, 10 sec hold 10"x6'      AROM scaption 2 x 15 2 x 15 2 x 15 2x15 2x15 1# 2x10 1# 2x10      AROM ABD 2 x 10 3 x 10 3 x 10 3x10 3x10 2 x 10 1# 2x10      Tband R0ws Pink, 2 x 15, 3 sec Pink, 2 x 15 Green, 2 x15 Green, 2 x15 gtb  2x15 NP ---      [employer], 2 x 15, 3 sec Pink, 2 x 15 Green, 2 x 10 Green, 2 x15 gtb  2x15 NP ---      Tband IR Pink, 3 x 10 Pink, 2 x 15 Green, 2 x 10 Green, 2 x15 gtb  TXU Coni, 2 x 15 GTB 2x15      Tband ER Yellow, 2 x 10 Yellow, 2 x 15 Pink, 2x 10 Pink, 2 x15 gtb  2x15 Green, 2 x 15 GTB 2x15      Ball circles at wall 20 cw/ccw x 3 20 cw/ccw x 3 20 cw/ccw x 2 20 cw/ccw x 2 2x20  cw/ccw 20 cw/ccw x 4 20 cw/ccw  4x ea      SL ER 1#, 3 x 10 1#3 x 10 2#, 3 x 10 2#  3x10 2#  3x10 2#, 3 x 10 2# 3x10      SL shoulder flexion/extension (slowly) 0#, 2 x 10 0#, 2 x 10 0#, 2 x 15 0#  2x15 0#  2x15 1#, 2 x 10 1# 2x10      SA punchups 1#, 2 x 20 2# 2 x 20 2#, 2 x 20 2#  2x20 2#  2x20 2#, 2 x 20 2# 5"x2x10      Prone rhomboids  0#, 2 x 10 0#, 2 x 10 0#  2x10 0#  2x10 0#, 2  x10 2x10      Prone lower traps  0#, 2 x 10 0+, 2 x 10 0#  2x10 0#  2x10 0#, 2 x 10 2x10      Prone extension  0#, 2 x 10 2#, 2 x 10 2#  2x10 2#  2x10 3#, 2 x 10 3# 2x10      Med ball up and out lift      2 2#, 2 x 10 Yellow ball 2x10      Rhythmic Stabilizations FF/Ext, HABd/HADd @ 90 degrees      3 x 10 30"x2 ea                   Modalities 8/10 8/14 8/16 8/20 8/23 8/27 8/31      Cryo R shoulder 10 min 10 min 10 min 10 min 10' 10 min                    *Pt supervised by PEACE BUTTS from 9:50-10:10am    Assessment: Tolerated treatment well  Patient exhibited good technique with therapeutic exercises and would benefit from continued PT to further improve strength and mobility  Plan: Continue per plan of care  Progress treament per protocol       Barby Stovall PTA

## 2018-09-04 ENCOUNTER — OFFICE VISIT (OUTPATIENT)
Dept: PHYSICAL THERAPY | Facility: CLINIC | Age: 68
End: 2018-09-04
Payer: MEDICARE

## 2018-09-04 DIAGNOSIS — Z98.890 S/P RIGHT ROTATOR CUFF REPAIR: ICD-10-CM

## 2018-09-04 DIAGNOSIS — M75.101 TEAR OF RIGHT ROTATOR CUFF, UNSPECIFIED TEAR EXTENT: Primary | ICD-10-CM

## 2018-09-04 PROCEDURE — 97150 GROUP THERAPEUTIC PROCEDURES: CPT | Performed by: PHYSICAL THERAPIST

## 2018-09-04 PROCEDURE — 97140 MANUAL THERAPY 1/> REGIONS: CPT | Performed by: PHYSICAL THERAPIST

## 2018-09-04 PROCEDURE — 97110 THERAPEUTIC EXERCISES: CPT | Performed by: PHYSICAL THERAPIST

## 2018-09-04 NOTE — PROGRESS NOTES
Daily Note     Today's date: 2018  Patient name: Alfredo Desai  : 1950  MRN: [id number]  Referring provider: Darrin Padron*  Dx:   Encounter Diagnosis     ICD-10-CM    1  Tear of right rotator cuff, unspecified tear extent M75 101    2  S/P right rotator cuff repair Z98 890               Subjective: Notes that she has continued with HEP, R shoulder feels a little better each week  Still not lifting more than the weight of a plate overhead      Objective: See treatment diary below    Precautions: HTN, follow Dr Darrell Goldberg protocol     Daily Treatment Diary   Manuals 8/10 8/14 8/16 8/20 8/23 8/27 8/31 9     PROM  R shoulder (No abduction, work in scaption) EV EMILY BUTTS [facility] RG     Manual posterior capsule stertch EV EMILY BUTTS [facility] RG                               Exercise Diary  8/10 8/14 8/16 8/20 8/23 8/27 8/31 94     UBE 3/3 3/3, L2 5 3/3, L 3 0 3/3, L 3 0 3'/3'   3/3 3' ea 3/3, L 3 0     Pulley flexion 5 min, 10 sec hold 5 min, 10 sec hold 5 min, 10 sec hold 5 min, 10 sec hold 5"  10" hold 3 min, 10 sec hold 10"x6' 3 min 10 sec hold     AROM scaption 2 x 15 2 x 15 2 x 15 2x15 2x15 1# 2x10 1# 2x10 1#, 2x 12     AROM ABD 2 x 10 3 x 10 3 x 10 3x10 3x10 2 x 10 1# 2x10 1#, 2 x 12     Tband R0ws Pink, 2 x 15, 3 sec Pink, 2 x 15 Green, 2 x15 Green, 2 x15 gtb  2x15 NP --- Ruma Sunny, 2 x 15     Tband Extension Pink, 2 x 15, 3 sec Pink, 2 x 15 Green, 2 x 10 Green, 2 x15 gtb  2x15 NP --- Green, 2 x 15     Tband IR Pink, 3 x 10 Pink, 2 x 15 Green, 2 x 10 Green, 2 x15 gtb  TXU Coni, 2 x 15 GTB 2x15 Green, 2 x 15     Tband ER Yellow, 2 x 10 Yellow, 2 x 15 Pink, 2x 10 Pink, 2 x15 gtb  2x15 Green, 2 x 15 GTB 2x15 Green, 2 x 15     Ball circles at wall 20 cw/ccw x 3 20 cw/ccw x 3 20 cw/ccw x 2 20 cw/ccw x 2 2x20  cw/ccw 20 cw/ccw x 4 20 cw/ccw  4x ea 20 cw/ccw x 4      SL ER 1#, 3 x 10 1#3 x 10 2#, 3 x 10 2#  3x10 2#  3x10 2#, 3 x 10 2# 3x10 2#,3 x 10     SL shoulder flexion/extension (slowly) 0#, 2 x 10 0#, 2 x 10 0#, 2 x 15 0#  2x15 0#  2x15 1#, 2 x 10 1# 2x10 1#, 2 x 15     SA punchups 1#, 2 x 20 2# 2 x 20 2#, 2 x 20 2#  2x20 2#  2x20 2#, 2 x 20 2# 4"[id number] 3#, 2  X 20     Prone rhomboids  0#, 2 x 10 0#, 2 x 10 0#  2x10 0#  2x10 0#, 2  x10 2x10 2 x 10     Prone lower traps  0#, 2 x 10 0+, 2 x 10 0#  2x10 0#  2x10 0#, 2 x 10 2x10 2 x 10     Prone extension  0#, 2 x 10 2#, 2 x 10 2#  2x10 2#  2x10 3#, 2 x 10 3# 2x10 3#, 2 x 15     Med ball up and out lift      2 2#, 2 x 10 Yellow ball 2x10 Yellow, 3 x 10     Rhythmic Stabilizations FF/Ext, HABd/HADd @ 90 degrees      3 x 10 30"x2 ea 30 sec x 3                  Modalities 8/10 8/14 8/16 8/20 8/23 8/27 8/31 9/4     Cryo R shoulder 10 min 10 min 10 min 10 min 10' 10 min  10 min                    Assessment: Tolerated treatment well  Patient exhibited good technique with therapeutic exercises and would benefit from continued PT to further improve strength and mobility  Plan: Continue per plan of care  Progress treament per protocol       Alejandro Garcia, PT

## 2018-09-05 ENCOUNTER — OFFICE VISIT (OUTPATIENT)
Dept: PHYSICAL THERAPY | Facility: CLINIC | Age: 68
End: 2018-09-05
Payer: MEDICARE

## 2018-09-05 DIAGNOSIS — Z98.890 S/P RIGHT ROTATOR CUFF REPAIR: ICD-10-CM

## 2018-09-05 DIAGNOSIS — M75.101 TEAR OF RIGHT ROTATOR CUFF, UNSPECIFIED TEAR EXTENT: Primary | ICD-10-CM

## 2018-09-05 PROCEDURE — 97110 THERAPEUTIC EXERCISES: CPT | Performed by: PHYSICAL THERAPIST

## 2018-09-05 PROCEDURE — 97140 MANUAL THERAPY 1/> REGIONS: CPT | Performed by: PHYSICAL THERAPIST

## 2018-09-05 NOTE — PROGRESS NOTES
Daily Note     Today's date: 2018  Patient name: Jaycee Prieto  : 1950  MRN: [id number]  Referring provider: Igor Johnson*  Dx:   Encounter Diagnosis     ICD-10-CM    1  Tear of right rotator cuff, unspecified tear extent M75 101    2  S/P right rotator cuff repair Z98 890               Subjective: Notes some difficulty reaching behind back with R arm while dressing    Objective: See treatment diary below  PROM full today for all except IR: 50 degrees      Precautions: HTN, follow Dr Juliane Alonzo protocol     Daily Treatment Diary   Manuals 8/10 8/14 8/16 8/20 8/23 8/27 8/31 9/4 9/5    PROM  R shoulder (No abduction, work in scaption) EV EMILY DIAZ MD RK RG  RG RG    Manual posterior capsule stertch EV EMILY DIAZ MD RK RG  RG RG                              Exercise Diary  8/10 8/14 8/16 8/20 8/23 8/27 8/31 9/4 9/5    UBE 3/3 3/3, L2 5 3/3, L 3 0 3/3, L 3 0 3'/3'   3/3 3' ea 3/3, L 3 0 3 3 L 3 0    Pulley flexion 5 min, 10 sec hold 5 min, 10 sec hold 5 min, 10 sec hold 5 min, 10 sec hold 5"  10" hold 3 min, 10 sec hold 10"x6' 3 min 10 sec hold 3 min, 10 sec hol    AROM scaption 2 x 15 2 x 15 2 x 15 2x15 2x15 1# 2x10 1# 2x10 1#, 2x 12 1#, 2 x 15    AROM ABD 2 x 10 3 x 10 3 x 10 3x10 3x10 2 x 10 1# 2x10 1#, 2 x 12 1#, 2 x 15    Tband R0ws Pink, 2 x 15, 3 sec Pink, 2 x 15 Green, 2 x15 Green, 2 x15 gtb  2x15 NP --- Justus Old, 2 x 15 Blue, 2 x 15    Tband Extension Pink, 2 x 15, 3 sec Pink, 2 x 15 Green, 2 x 10 Green, 2 x15 gtb  2x15 NP --- Justus Old, 2 x 15 Green, 2 x 15    Tband IR Pink, 3 x 10 Pink, 2 x 15 Green, 2 x 10 Green, 2 x15 gtb  TXU Coni, 2 x 15 GTB 2x15 Green, 2 x 15 Green, 2 x15    Tband ER Yellow, 2 x 10 Yellow, 2 x 15 Pink, 2x 10 Pink, 2 x15 gtb  TXU Coni, 2 x 15 GTB 2x15 Green, 2 x 15 Green, 2 x 15    Ball circles at wall 20 cw/ccw x 3 20 cw/ccw x 3 20 cw/ccw x 2 20 cw/ccw x 2 2x20  cw/ccw 20 cw/ccw x 4 20 cw/ccw  4x ea 20 cw/ccw x 4  20 cw/ccw x 4    SL ER 1#, 3 x 10 1#3 x 10 2#, 3 x 10 2#  3x10 2#  3x10 2#, 3 x 10 2# 3x10 2#,3 x 10 2#, 2 x 10    SL shoulder flexion/extension (slowly) 0#, 2 x 10 0#, 2 x 10 0#, 2 x 15 0#  2x15 0#  2x15 1#, 2 x 10 1# 2x10 1#, 2 x 15 1#, 2 x 15    SA punchups 1#, 2 x 20 2# 2 x 20 2#, 2 x 20 2#  2x20 2#  2x20 2#, 2 x 20 2# 5"x2x10 3#, 2  X 20 3#, 2 x 10    Prone rhomboids  0#, 2 x 10 0#, 2 x 10 0#  2x10 0#  2x10 0#, 2  x10 2x10 2 x 10 1#, 2 x 10    Prone lower traps  0#, 2 x 10 0+, 2 x 10 0#  2x10 0#  2x10 0#, 2 x 10 2x10 2 x 10 1#, 2 x 10    Prone extension  0#, 2 x 10 2#, 2 x 10 2#  2x10 2#  2x10 3#, 2 x 10 3# 2x10 3#, 2 x 15 3#, 2 x 15    Med ball up and out lift      2 2#, 2 x 10 Yellow ball 2x10 Yellow, 3 x 10 4 4#, 3  X 10    Rhythmic Stabilizations FF/Ext, HABd/HADd @ 90 degrees      3 x 10 30"x2 ea 30 sec x 3 30 sec x 3  Also self IR stretch 10 sec x 10, sleeper IR 30 sec x 3             Wall pushups 2 x 10    Modalities 8/10 8/14 8/16 8/20 8/23 8/27 8/31 9/4 9/5    Cryo R shoulder 10 min 10 min 10 min 10 min 10' 10 min  10 min 10 min                   Assessment: Did well selvin weight bearing progressions  Added more self IR stretching into HEP  Plan: Continue per plan of care  Progress treament per protocol       Ashely Evangelista, PT

## 2018-09-06 ENCOUNTER — APPOINTMENT (OUTPATIENT)
Dept: PHYSICAL THERAPY | Facility: CLINIC | Age: 68
End: 2018-09-06
Payer: MEDICARE

## 2018-09-10 ENCOUNTER — OFFICE VISIT (OUTPATIENT)
Dept: PHYSICAL THERAPY | Facility: CLINIC | Age: 68
End: 2018-09-10
Payer: MEDICARE

## 2018-09-10 DIAGNOSIS — M75.101 TEAR OF RIGHT ROTATOR CUFF, UNSPECIFIED TEAR EXTENT: Primary | ICD-10-CM

## 2018-09-10 PROCEDURE — 97110 THERAPEUTIC EXERCISES: CPT | Performed by: PHYSICAL THERAPIST

## 2018-09-10 PROCEDURE — 97140 MANUAL THERAPY 1/> REGIONS: CPT | Performed by: PHYSICAL THERAPIST

## 2018-09-10 NOTE — PROGRESS NOTES
Daily Note     Today's date: 9/10/2018  Patient name: Sana Dunne  : 1950  MRN: [id number]  Referring provider: Lynn Banuelos*  Dx:   Encounter Diagnosis     ICD-10-CM    1  Tear of right rotator cuff, unspecified tear extent M75 101      Start Time: 0803  Stop Time: 0900  Total time in clinic (min): 57 minutes  Subjective: "A little sore," following progressions last visit  Objective: See treatment diary below  PROM IR: 50 degrees      Precautions: HTN, follow Dr Kyle Jean protocol     Daily Treatment Diary   Manuals 8/10 8/14 8/16 8/20 8/23 8/27 8/31 9/4 9/5 9/10   PROM  R shoulder (No abduction, work in scaption) EV EMILY DIAZ MD RK RG SH RG RG RG   Manual posterior capsule stertch EV EMILY DIAZ MD RK RG SH RG RG RG                             Exercise Diary  8/10 8/14 8/16 8/20 8/23 8/27 8/31 9/4 9/5 9/10   UBE 3/3 3/3, L2 5 3/3, L 3 0 3/3, L 3 0 3'/3'   3/3 3' ea 3/3, L 3 0 3 3 L 3 0 3/3, L 3 5   Pulley flexion 5 min, 10 sec hold 5 min, 10 sec hold 5 min, 10 sec hold 5 min, 10 sec hold 5"  10" hold 3 min, 10 sec hold 10"x6' 3 min 10 sec hold 3 min, 10 sec hol 3 min, 10 sec hold   AROM scaption 2 x 15 2 x 15 2 x 15 2x15 2x15 1# 2x10 1# 2x10 1#, 2x 12 1#, 2 x 15 1#, 2 x 15   AROM ABD 2 x 10 3 x 10 3 x 10 3x10 3x10 2 x 10 1# 2x10 1#, 2 x 12 1#, 2 x 15 1# 2 x 15   Tband R0ws Pink, 2 x 15, 3 sec Pink, 2 x 15 Green, 2 x15 Green, 2 x15 gtb  2x15 NP --- Danni Shack, 2 x 15 Blue, 2 x 15 Blue, 2 x 15   Tband Extension Pink, 2 x 15, 3 sec Pink, 2 x 15 Green, 2 x 10 Green, 2 x15 gtb  2x15 NP --- Danni Shack, 2 x 15 Green, 2 x 15 Green, 2 x 15   Tband IR Pink, 3 x 10 Pink, 2 x 15 Green, 2 x 10 Green, 2 x15 gtb  TXU Coni, 2 x 15 GTB 2x15 Green, 2 x 15 Green, 2 x15 Green, 2 x 15   Tband ER Yellow, 2 x 10 Yellow, 2 x 15 Pink, 2x 10 Pink, 2 x15 gtb  TXU Coni, 2 x 15 GTB 2x15 Green, 2 x 15 Green, 2 x 15 Green, 2 x 15   Ball circles at wall 20 cw/ccw x 3 20 cw/ccw x 3 20 cw/ccw x 2 20 cw/ccw x 2 2x20  cw/ccw 20 cw/ccw x 4 20 cw/ccw  4x ea 20 cw/ccw x 4  20 cw/ccw x 4 20 cw/ccw x 4   SL ER 1#, 3 x 10 1#3 x 10 2#, 3 x 10 2#  3x10 2#  3x10 2#, 3 x 10 2# 3x10 2#,3 x 10 2#, 2 x 10 2#, 3  x10   SL shoulder flexion/extension (slowly) 0#, 2 x 10 0#, 2 x 10 0#, 2 x 15 0#  2x15 0#  2x15 1#, 2 x 10 1# 2x10 1#, 2 x 15 1#, 2 x 15 1#, 2  x15   SA punchups 1#, 2 x 20 2# 2 x 20 2#, 2 x 20 2#  2x20 2#  2x20 2#, 2 x 20 2# 5"x2x10 3#, 2  X 20 3#, 2 x 10 3#, 2 x 20   Prone rhomboids  0#, 2 x 10 0#, 2 x 10 0#  2x10 0#  2x10 0#, 2  x10 2x10 2 x 10 1#, 2 x 10 1#, 2 x 10   Prone lower traps  0#, 2 x 10 0+, 2 x 10 0#  2x10 0#  2x10 0#, 2 x 10 2x10 2 x 10 1#, 2 x 10 1#, 2 x 10   Prone extension  0#, 2 x 10 2#, 2 x 10 2#  2x10 2#  2x10 3#, 2 x 10 3# 2x10 3#, 2 x 15 3#, 2 x 15 3#, 2 x 20   Med ball up and out lift      2 2#, 2 x 10 Yellow ball 2x10 Yellow, 3 x 10 4 4#, 3  X 10 4 4#,  3 x 10   Rhythmic Stabilizations FF/Ext, HABd/HADd @ 90 degrees      3 x 10 30"x2 ea 30 sec x 3 30 sec x 3  Also self IR stretch 10 sec x 10, sleeper IR 30 sec x 3 X 30, , also self IR stretch , 10 sec x 10, Sleeper IR, 30 sec x 3            Wall pushups 2 x 10 Wall pushups 2 x 10   Modalities 8/10 8/14 8/16 8/20 8/23 8/27 8/31 9/4 9/5 9/10    Cryo R shoulder 10 min 10 min 10 min 10 min 10' 10 min  10 min 10 min def                  Assessment: IR ROM unchanged since last visit  Was not performing self sidelying IR stretch well  Reviewed proper technique with patient  Patient would benefit from continued course of skilled physical therapy to address impairments in an effort to improve function  Plan: Continue per plan of care  Progress treament per protocol       Eyal Patricia, PT

## 2018-09-13 ENCOUNTER — OFFICE VISIT (OUTPATIENT)
Dept: PHYSICAL THERAPY | Facility: CLINIC | Age: 68
End: 2018-09-13
Payer: MEDICARE

## 2018-09-13 DIAGNOSIS — M75.101 TEAR OF RIGHT ROTATOR CUFF, UNSPECIFIED TEAR EXTENT: Primary | ICD-10-CM

## 2018-09-13 DIAGNOSIS — Z98.890 S/P RIGHT ROTATOR CUFF REPAIR: ICD-10-CM

## 2018-09-13 PROCEDURE — 97140 MANUAL THERAPY 1/> REGIONS: CPT | Performed by: PHYSICAL THERAPIST

## 2018-09-13 PROCEDURE — 97110 THERAPEUTIC EXERCISES: CPT | Performed by: PHYSICAL THERAPIST

## 2018-09-13 NOTE — PROGRESS NOTES
Daily Note     Today's date: 2018  Patient name: Sarah Tuttle  : 1950  MRN: [id number]  Referring provider: Elena Anderson*  Dx:   Encounter Diagnosis     ICD-10-CM    1  Tear of right rotator cuff, unspecified tear extent M75 101    2  S/P right rotator cuff repair Z98 890               Subjective:No pain today, R shoulder has been feeling good since last visit  Feels strength is improving  Objective: See treatment diary below  PROM IR: 60 degrees  (improved since last visit)  AROM IR: T9    Precautions: HTN, follow Dr Guan Dates protocol     Daily Treatment Diary   Manuals             PROM  R shoulder (No abduction, work in scaption) RG            Manual posterior capsule stertch RG                                      Exercise Diary              UBE 3/3, L 4 0            Pulley flexion 3 min, 10 sec hold            AROM scaption 1#   2 x 15            AROM ABD 1#,   2 x 15            Tband Rows Blue, 2 x 15            Tband Extension Green  2 x15            Tband IR Blue  2 x 15            Tband ER Green  2 [place] circles at wall 20 cw/ccw x 4            SL ER 2#  3 x 10            SL shoulder flexion/extension (slowly) 1# 2 x 15            SA punchups 3#  2 x 20            Prone rhomboids 1#   2 x 15            Prone lower traps 1#   2 x 15            Prone extension 3#   2 x 15            Med ball up and out lift 4 4#  3 x 10            Rhythmic Stabilizations FF/Ext, HABd/HADd @ 90 degrees 3 x 10  Sleeper IR: 30 sec x 3            Wall Pushups 2 x 10            Modalities             Cryo R shoulder 10 min 10 min 10 min 10 min 10' 10 min  10 min 10 min def                  Assessment: IR ROM improved significantly since last visit  Plan: Continue per plan of care  Progress treament per protocol       Alejandro Garcia, PT

## 2018-09-17 ENCOUNTER — OFFICE VISIT (OUTPATIENT)
Dept: PHYSICAL THERAPY | Facility: CLINIC | Age: 68
End: 2018-09-17
Payer: MEDICARE

## 2018-09-17 DIAGNOSIS — M75.101 TEAR OF RIGHT ROTATOR CUFF, UNSPECIFIED TEAR EXTENT: Primary | ICD-10-CM

## 2018-09-17 DIAGNOSIS — Z98.890 S/P RIGHT ROTATOR CUFF REPAIR: ICD-10-CM

## 2018-09-17 PROCEDURE — 97110 THERAPEUTIC EXERCISES: CPT | Performed by: PHYSICAL THERAPIST

## 2018-09-17 PROCEDURE — 97140 MANUAL THERAPY 1/> REGIONS: CPT | Performed by: PHYSICAL THERAPIST

## 2018-09-17 NOTE — PROGRESS NOTES
Daily Note     Today's date: 2018  Patient name: Analisa Watson  : 1950  MRN: [id number]  Referring provider: Noreen Barnhart  Dx:   Encounter Diagnosis     ICD-10-CM    1  Tear of right rotator cuff, unspecified tear extent M75 101    2  S/P right rotator cuff repair Z98 890               Subjective: Primary complaint is of weakness with lifting  No recent pain  Objective: See treatment diary below  Progressed functional strengthening with use of medicine ball lift  Precautions: HTN, follow Dr Peyton Baldwin protocol     Daily Treatment Diary   Manuals            PROM  R shoulder (No abduction, work in scaption) RG RG           Manual posterior capsule stertch RG RG                                     Exercise Diary             UBE 3/3, L 4 0 3/3 L 4 0           Pulley flexion 3 min, 10 sec hold 3 min, 10 sec hold           AROM scaption 1#   2 x 15 1#, 2 x 15           AROM ABD 1#,   2 x 15 1#, 2 x 15           Tband Rows Blue, 2 x 15 Blue, 2 x 15           Tband Extension Green  2 x15 Blue, 2 x 15           Tband IR Blue  2 x 15 Blue, 2 x 15           Tband ER Green  2 x15 Green, 2  W  R  [place] circles at wall 20 cw/ccw x 4 20 cw/ccw x 4           SL ER 2#  3 x 10 2#, 3 x 10           SL shoulder flexion/extension (slowly) 1# 2 x 15 1#  2 x 15           SA punchups 3#  2 x 20 3#, 2 x 0           Prone rhomboids 1#   2 x 15 1#, 2 x 15           Prone lower traps 1#   2 x 15 1#, 2 x 15           Prone extension 3#   2 x 15 3#, 2 x 15           Med ball up and out lift 4 4#  3 x 10 6 6#, 2 x 10           Rhythmic Stabilizations FF/Ext, HABd/HADd @ 90 degrees 3 x 10  Sleeper IR: 30 sec x 3 3 x 10, sleeper IR, 30 sec x 3           Wall Pushups 2 x 10 3 x 10           Modalities             Cryo R shoulder 10 min 10 min      10 min 10 min def                  Assessment: Fatigued with progressions, will work towards goals of 10 # OH lift x 10 without pain      Plan: Continue per plan of care  Progress treament per protocol  Frequency now reduced to one visit per week      Rose Mary Pulido, PT

## 2018-09-24 ENCOUNTER — OFFICE VISIT (OUTPATIENT)
Dept: PHYSICAL THERAPY | Facility: CLINIC | Age: 68
End: 2018-09-24
Payer: MEDICARE

## 2018-09-24 DIAGNOSIS — M75.101 TEAR OF RIGHT ROTATOR CUFF, UNSPECIFIED TEAR EXTENT: Primary | ICD-10-CM

## 2018-09-24 DIAGNOSIS — Z98.890 S/P RIGHT ROTATOR CUFF REPAIR: ICD-10-CM

## 2018-09-24 PROCEDURE — G8990 OTHER PT/OT CURRENT STATUS: HCPCS | Performed by: PHYSICAL THERAPIST

## 2018-09-24 PROCEDURE — 97140 MANUAL THERAPY 1/> REGIONS: CPT | Performed by: PHYSICAL THERAPIST

## 2018-09-24 PROCEDURE — 97110 THERAPEUTIC EXERCISES: CPT | Performed by: PHYSICAL THERAPIST

## 2018-09-24 PROCEDURE — G8991 OTHER PT/OT GOAL STATUS: HCPCS | Performed by: PHYSICAL THERAPIST

## 2018-09-24 NOTE — PROGRESS NOTES
Daily Note     Today's date: 2018  Patient name: Epifanio Leyva  : 1950  MRN: [id number]  Referring provider: Abdi Boswell*  Dx:   Encounter Diagnosis     ICD-10-CM    1  Tear of right rotator cuff, unspecified tear extent M75 101    2  S/P right rotator cuff repair Z98 890               Subjective: No pain since last visit  Still feels some weakness with lifting over shoulder level and some tightness with reaching behind her back  Objective: See treatment diary below  Progressed UE strengthening    Precautions: HTN, follow Dr Alma Delia Tolbert protocol     Daily Treatment Diary   Manuals           PROM  R shoulder (No abduction, work in scaption) RG RG RG          Manual posterior capsule stertch RG RG RG                                    Exercise Diary            UBE 3/3, L 4 0 3/3 L 4 0 3/3 L 4 0          Pulley flexion 3 min, 10 sec hold 3 min, 10 sec hold 3 min, 10 sec hold          AROM scaption 1#   2 x 15 1#, 2 x 15 2#, 2 x 10          AROM ABD 1#,   2 x 15 1#, 2 x 15 1#, 2 x 15          Tband Rows Blue, 2 x 15 Blue, 2 x 15 Blue, 2 x 15          Tband Extension Green  2 x15 Blue, 2 x 15 Blue, 2 x 15          Tband IR Blue  2 x 15 Blue, 2 x 15 Blue, 2 x 15          Tband ER Green  2 x15 Green, 2  x15 Blue, 2 x 15          Ball circles at wall 20 cw/ccw x 4 20 cw/ccw x 4 20 cw/ccw x 4          SL ER 2#  3 x 10 2#, 3 x 10 2#, 3 x 10          SL shoulder flexion/extension (slowly) 1# 2 x 15 1#  2 x 15 1#, 2 x 15          SA punchups 3#  2 x 20 3#, 2 x 0 3#, 2 x 20          Prone rhomboids 1#   2 x 15 1#, 2 x 15 1#, 2 x 15          Prone lower traps 1#   2 x 15 1#, 2 x 15 1#, 2 x 15          Prone extension 3#   2 x 15 3#, 2 x 15 3#, 2 x 15          Med ball up and out lift 4 4#  3 x 10 6 6#, 2 x 10 6 6#, 3 x 10          Rhythmic Stabilizations FF/Ext, HABd/HADd @ 90 degrees 3 x 10  Sleeper IR: 30 sec x 3 3 x 10, sleeper IR, 30 sec x 3 AAIR 10 sec x 5 Wall Pushups 2 x 10 3 x 10 3 x 10          Modalities 9/13            Cryo R shoulder 10 min 10 min 10 min     10 min 10 min def                  Assessment: Continues to make good progress  Plan: Continue per plan of care  Progress treament per protocol  Frequency now reduced to one visit per week  Reevaluate next week for possible discharge, she is to return to MD at the end of next week      Osiel Ritter, PT

## 2018-10-01 ENCOUNTER — TRANSCRIBE ORDERS (OUTPATIENT)
Dept: PHYSICAL THERAPY | Facility: CLINIC | Age: 68
End: 2018-10-01

## 2018-10-01 ENCOUNTER — OFFICE VISIT (OUTPATIENT)
Dept: PHYSICAL THERAPY | Facility: CLINIC | Age: 68
End: 2018-10-01
Payer: MEDICARE

## 2018-10-01 DIAGNOSIS — M75.101 ROTATOR CUFF SYNDROME OF RIGHT SHOULDER: Primary | ICD-10-CM

## 2018-10-01 DIAGNOSIS — M75.101 TEAR OF RIGHT ROTATOR CUFF, UNSPECIFIED TEAR EXTENT: Primary | ICD-10-CM

## 2018-10-01 PROCEDURE — G8991 OTHER PT/OT GOAL STATUS: HCPCS | Performed by: PHYSICAL THERAPIST

## 2018-10-01 PROCEDURE — G8990 OTHER PT/OT CURRENT STATUS: HCPCS | Performed by: PHYSICAL THERAPIST

## 2018-10-01 PROCEDURE — 97140 MANUAL THERAPY 1/> REGIONS: CPT | Performed by: PHYSICAL THERAPIST

## 2018-10-01 PROCEDURE — 97110 THERAPEUTIC EXERCISES: CPT | Performed by: PHYSICAL THERAPIST

## 2018-10-01 NOTE — PROGRESS NOTES
PT Re-Evaluation     Today's date: 10/1/2018  Patient name: Arnol Marcos  : 1950  MRN: [id number]  Referring provider: Aminta Broussard*  Dx:   Encounter Diagnosis     ICD-10-CM    1  Tear of right rotator cuff, unspecified tear extent M75 101                   Assessment    Assessment details: Patient is a 78 y o  female who  presents with pain, range of motion loss, weakness s/p R arthroscopic rotator cuff repair, subacromial decompression, 18  Since starting therapy pt has made the following progress towards goals:  1) Decreased pain  2) Improved range of motion  3) Improved strength  4) Improved self rated functional score  Making good progress now 17 week,4 days post op  At this point working to maximize functional ROM and strength  She in anxious to return to pickleball  Recommend continued short course of therapy (1-2 more visit)  See updated goals below  Understanding of Dx/Px/POC: excellent  Goals  ST : Decrease R shoulder pain to 2/10 at worst x 1 continuous week within 6 weeks-met         PROM: Improve R shoulder PROM to 120 degrees for flexion/scaption, ER to 45 degrees, IR to 45 degrees within 6 weeks  -met         AROM Improve R shoulder AROM flexion to 90 degrees in 6-8 weeks  -met         Pt to begin submaximal shoulder isometrics by 8  weeks post op -met         Improved FOTO self rated functional scale score, pt to note greater ease with dressing, return to driving within 6 weeks  -met    LT : Eliminate R shoulder pain x 1 continuous week within 12 weeks  -not met         PROM: Improve R shoulder PROM to 170 degrees for flexion/scaption, ER to 90 degrees, IR to 75degrees within 12 weeks  -met         AROM :  Improve R shoulder AROM to 170 degrees for flexion/scaption, ER to 90 degrees, IR to T7 level within 16 weeks  -met         Strength: 5/5 R shoulder stength within 16 weeks  -partially met         Function: FOTO score to be at least 70 within 16-20 weeks   -not met         Independent with home exercise program -not met        Plan  Patient would benefit from: skilled PT  Planned modality interventions: cryotherapy, TENS, thermotherapy: hydrocollator packs and ultrasound  Planned therapy interventions: ADL training, body mechanics training, functional ROM exercises, home exercise program, joint mobilization, manual therapy, neuromuscular re-education, patient education, postural training, strengthening, stretching, therapeutic activities and therapeutic exercise  Frequency: 1x week (2-3x's week)  Treatment plan discussed with: patient        Subjective Evaluation    History of Present Illness  Date of surgery: 2018  Mechanism of injury: Underwent a (R) shoulder arthroscopy on 16 for a supraspinatus repair (double row), subacromial decompression  Rod Miller tells me her R shoulder is feeling much better  Is mostly pain-free  Will still feel tightness at time with reaching behind back and a weakness with lifting activities  She is anxious to return to pickle ball    Pain  No pain reported  Current pain ratin  At best pain ratin  At worst pain ratin  Quality: dull ache    Social Support  Lives with: alone    Hand dominance: right    Patient Goals  Patient goals for therapy: decreased pain, independence with ADLs/IADLs, increased strength, return to sport/leisure activities and increased motion (Patient is making good progress towards personal goals )  Patient goal: return to playing pickleball, walking for exercise, working out in gym        Objective     Active Range of Motion     Right Shoulder   Flexion: 175 degrees   Abduction: 170 degrees   External rotation 90°: 90 degrees  Internal rotation BTB: T7     Passive Range of Motion     Right Shoulder   Normal passive range of motion    Scapular Mobility   Left Shoulder   Scapular Dyskinesis: none  Scapular mobility: WFL    Right Shoulder   Scapular Dyskinesis: none  Scapular mobility: [facility]    Strength/Myotome Testing     Right Shoulder     Planes of Motion   Flexion: 5   Extension: 5   Abduction: 5   Adduction: 5   External rotation at 0°: 5   Internal rotation at 0°: 5     Isolated Muscles   Biceps: 5   Lower trapezius: 4+   Rhomboids: 4+   Serratus anterior: 5   Triceps: 5     Tests     Right Shoulder   Negative crossover and empty can       General Comments     Shoulder Comments   Arthroscopic surgical incision sites are healed    No shoulder edema              Precautions: HTN, follow Dr Anirudh Connolly protocol  Daily Treatment Diary   Manuals 9/13 9/17 9/25  10/1               PROM  R shoulder (No abduction, work in scaption) RG RG RG  RG               Manual posterior capsule stertch RG RG RG RG                                                                Exercise Diary  9/13 9/17 9/24  10/1               UBE 3/3, L 4 0 3/3 L 4 0 3/3 L 4 0 3/3, L 4 0                Pulley flexion 3 min, 10 sec hold 3 min, 10 sec hold 3 min, 10 sec hold 3 min 10 sec hold               AROM scaption 1#   2 x 15 1#, 2 x 15 2#, 2 x 10  2# 2 x 10               AROM ABD 1#,   2 x 15 1#, 2 x 15 1#, 2 x 15  1#, 2 x 15               Tband Rows Blue, 2 x 15 Blue, 2 x 15 Blue, 2 x 15  Blue, 2 x 15               Tband Extension Green  2 x15 Blue, 2 x 15 Blue, 2 x 15 Blue, 2 x 15                Tband IR Blue  2 x 15 Blue, 2 x 15 Blue, 2 x 15 Blue, 2 x 15                Tband ER Green  2 x15 Green, 2  x15 Blue, 2 x 15 Blue, 2 x 15                Ball circles at wall 20 cw/ccw x 4 20 cw/ccw x 4 20 cw/ccw x 4  20 cw/ccw x 4               SL ER 2#  3 x 10 2#, 3 x 10 2#, 3 x 10 2#, 2 x 10                SL shoulder flexion/extension (slowly) 1# 2 x 15 1#  2 x 15 1#, 2 x 15  1# 2 x 15               SA punchups 3#  2 x 20 3#, 2 x 0 3#, 2 x 20 5#, 2 x 20                Prone rhomboids 1#   2 x 15 1#, 2 x 15 1#, 2 x 15 1#, 2 x 15                Prone lower traps 1#   2 x 15 1#, 2 x 15 1#, 2 x 15 1#, 2 x 15                Prone extension 3#   2 x 15 3#, 2 x 15 3#, 2 x 15 3#, 2 x 15                Med ball up and out lift 4 4#  3 x 10 6 6#, 2 x 10 6 6#, 3 x 10  6 6#, 3 x 10               Rhythmic Stabilizations FF/Ext, HABd/HADd @ 90 degrees 3 x 10  Sleeper IR: 30 sec x 3 3 x 10, sleeper IR, 30 sec x 3 AAIR 10 sec x 5  3 x 10, sleeper IR, 30 sec x 3               Wall Pushups 2 x 10 3 x 10 3 x 10  3 x 10               Modalities 9/13                     Cryo R shoulder 10 min 10 min 10 min  10 min

## 2018-10-01 NOTE — LETTER
2018    Kody Rivera MD  [address] [facility] [address]    Patient: Salvador Padron   YOB: 1950   Date of Visit: 10/1/2018     Encounter Diagnosis     ICD-10-CM    1  Tear of right rotator cuff, unspecified tear extent M75 101        Dear Dr Gianluca Gold:    Please review the attached Plan of Care from [facility] recent visit  Please verify that you agree therapy should continue by signing the attached document and sending it back to our office  If you have any questions or concerns, please don't hesitate to call  Sincerely,    Chayito Blevins PT      Referring Provider:      I certify that I have read the below Plan of Care and certify the need for these services furnished under this plan of treatment while under my care  Kody Rivera MD  [address] [place] [address] 109: [phone]          PT Re-Evaluation     Today's date: 10/1/2018  Patient name: Salvador Padron  : 1950  MRN: [id number]  Referring provider: Sukh Castillo*  Dx:   Encounter Diagnosis     ICD-10-CM    1  Tear of right rotator cuff, unspecified tear extent M75 101                   Assessment    Assessment details: Patient is a 78 y o  female who  presents with pain, range of motion loss, weakness s/p R arthroscopic rotator cuff repair, subacromial decompression, 18  Since starting therapy pt has made the following progress towards goals:  1) Decreased pain  2) Improved range of motion  3) Improved strength  4) Improved self rated functional score  Making good progress now 17 week,4 days post op  At this point working to maximize functional ROM and strength  She in anxious to return to pickleball  Recommend continued short course of therapy (1-2 more visit)  See updated goals below        Understanding of Dx/Px/POC: excellent  Goals  ST : Decrease R shoulder pain to 2/10 at worst x 1 continuous week within 6 weeks-met PROM: Improve R shoulder PROM to 120 degrees for flexion/scaption, ER to 45 degrees, IR to 45 degrees within 6 weeks  -met         AROM Improve R shoulder AROM flexion to 90 degrees in 6-8 weeks  -met         Pt to begin submaximal shoulder isometrics by 8  weeks post op -met         Improved FOTO self rated functional scale score, pt to note greater ease with dressing, return to driving within 6 weeks  -met    LT : Eliminate R shoulder pain x 1 continuous week within 12 weeks  -not met         PROM: Improve R shoulder PROM to 170 degrees for flexion/scaption, ER to 90 degrees, IR to 75degrees within 12 weeks  -met         AROM :  Improve R shoulder AROM to 170 degrees for flexion/scaption, ER to 90 degrees, IR to T7 level within 16 weeks  -met         Strength: 5/5 R shoulder stength within 16 weeks  -partially met         Function: FOTO score to be at least 70 within 16-20 weeks  -not met         Independent with home exercise program -not met        Plan  Patient would benefit from: skilled PT  Planned modality interventions: cryotherapy, TENS, thermotherapy: hydrocollator packs and ultrasound  Planned therapy interventions: ADL training, body mechanics training, functional ROM exercises, home exercise program, joint mobilization, manual therapy, neuromuscular re-education, patient education, postural training, strengthening, stretching, therapeutic activities and therapeutic exercise  Frequency: 1x week (2-3x's week)  Treatment plan discussed with: patient        Subjective Evaluation    History of Present Illness  Date of surgery: 5/31/2018  Mechanism of injury: Underwent a (R) shoulder arthroscopy on 5/31/16 for a supraspinatus repair (double row), subacromial decompression  Alyssa Campbellolive tells me her R shoulder is feeling much better  Is mostly pain-free  Will still feel tightness at time with reaching behind back and a weakness with lifting activities  She is anxious to return to pickle ball    Pain  No pain reported  Current pain ratin  At best pain ratin  At worst pain ratin  Quality: dull ache    Social Support  Lives with: alone    Hand dominance: right    Patient Goals  Patient goals for therapy: decreased pain, independence with ADLs/IADLs, increased strength, return to sport/leisure activities and increased motion (Patient is making good progress towards personal goals )  Patient goal: return to playing pickleball, walking for exercise, working out in gym        Objective     Active Range of Motion     Right Shoulder   Flexion: 175 degrees   Abduction: 170 degrees   External rotation 90°:  90 degrees  Internal rotation BTB: T7     Passive Range of Motion     Right Shoulder   Normal passive range of motion    Scapular Mobility   Left Shoulder   Scapular Dyskinesis: none  Scapular mobility: WFL    Right Shoulder   Scapular Dyskinesis: none  Scapular mobility: WFL    Strength/Myotome Testing     Right Shoulder     Planes of Motion   Flexion: 5   Extension: 5   Abduction: 5   Adduction: 5   External rotation at 0°:  5   Internal rotation at 0°:  5     Isolated Muscles   Biceps: 5   Lower trapezius: 4+   Rhomboids: 4+   Serratus anterior: 5   Triceps: 5     Tests     Right Shoulder   Negative crossover and empty can       General Comments     Shoulder Comments   Arthroscopic surgical incision sites are healed    No shoulder edema              Precautions: HTN, follow Dr Tawanna Stewart protocol  Daily Treatment Diary   Manuals 9/13 9/17 9/25  10/1               PROM  R shoulder (No abduction, work in scaption) RG RG RG  RG               Manual posterior capsule stertch RG RG RG RG                                                                Exercise Diary  9/13 9/17 9/24  10/1               UBE 3/3, L 4 0 3/3 L 4 0 3/3 L 4 0 3/3, L 4 0                Pulley flexion 3 min, 10 sec hold 3 min, 10 sec hold 3 min, 10 sec hold 3 min 10 sec hold               AROM scaption 1#   2 x 15 1#, 2 x 15 2#, 2 x 10  2# 2 x 10               AROM ABD 1#,   2 x 15 1#, 2 x 15 1#, 2 x 15  1#, 2 x 15               Tband Rows Blue, 2 x 15 Blue, 2 x 15 Blue, 2 x 15  Blue, 2 x 15               Tband Extension Green  2 x15 Blue, 2 x 15 Blue, 2 x 15 Blue, 2 x 15                Tband IR Blue  2 x 15 Blue, 2 x 15 Blue, 2 x 15 Blue, 2 x 15                Tband ER Green  2 x15 Green, 2  x15 Blue, 2 x 15 Blue, 2 x 15                Ball circles at wall 20 cw/ccw x 4 20 cw/ccw x 4 20 cw/ccw x 4  20 cw/ccw x 4               SL ER 2#  3 x 10 2#, 3 x 10 2#, 3 x 10 2#, 2 x 10                SL shoulder flexion/extension (slowly) 1# 2 x 15 1#  2 x 15 1#, 2 x 15  1# 2 x 15               SA punchups 3#  2 x 20 3#, 2 x 0 3#, 2 x 20 5#, 2 x 20                Prone rhomboids 1#   2 x 15 1#, 2 x 15 1#, 2 x 15 1#, 2 x 15                Prone lower traps 1#   2 x 15 1#, 2 x 15 1#, 2 x 15 1#, 2 x 15                Prone extension 3#   2 x 15 3#, 2 x 15 3#, 2 x 15 3#, 2 x 15                Med ball up and out lift 4 4#  3 x 10 6 6#, 2 x 10 6 6#, 3 x 10  6 6#, 3 x 10               Rhythmic Stabilizations FF/Ext, HABd/HADd @ 90 degrees 3 x 10  Sleeper IR: 30 sec x 3 3 x 10, sleeper IR, 30 sec x 3 AAIR 10 sec x 5  3 x 10, sleeper IR, 30 sec x 3               Wall Pushups 2 x 10 3 x 10 3 x 10  3 x 10               Modalities 9/13                     Cryo R shoulder 10 min 10 min 10 min  10 min

## 2018-10-08 ENCOUNTER — OFFICE VISIT (OUTPATIENT)
Dept: PHYSICAL THERAPY | Facility: CLINIC | Age: 68
End: 2018-10-08
Payer: MEDICARE

## 2018-10-08 DIAGNOSIS — M75.101 TEAR OF RIGHT ROTATOR CUFF, UNSPECIFIED TEAR EXTENT: Primary | ICD-10-CM

## 2018-10-08 DIAGNOSIS — Z98.890 S/P RIGHT ROTATOR CUFF REPAIR: ICD-10-CM

## 2018-10-08 PROCEDURE — 97140 MANUAL THERAPY 1/> REGIONS: CPT | Performed by: PHYSICAL THERAPIST

## 2018-10-08 PROCEDURE — G8992 OTHER PT/OT  D/C STATUS: HCPCS | Performed by: PHYSICAL THERAPIST

## 2018-10-08 PROCEDURE — 97110 THERAPEUTIC EXERCISES: CPT | Performed by: PHYSICAL THERAPIST

## 2018-10-08 PROCEDURE — G8991 OTHER PT/OT GOAL STATUS: HCPCS | Performed by: PHYSICAL THERAPIST

## 2018-10-08 NOTE — PROGRESS NOTES
PT Discharge    Today's date: 10/8/2018  Patient name: Aiyana Browne  : 1950  MRN: [id number]  Referring provider: Trina Bianchi  Dx:   Encounter Diagnosis     ICD-10-CM    1  Tear of right rotator cuff, unspecified tear extent M75 101    2  S/P right rotator cuff repair Z98 890                   Assessment    Assessment details: Patient is a 78 y o  female who  presents with pain, range of motion loss, weakness s/p R arthroscopic rotator cuff repair, subacromial decompression, 18  Since starting therapy pt has made the following progress towards goals:  1) Decreased pain  2) Improved range of motion  3) Improved strength  4) Improved self rated functional score  Patient has recovered well thus far now 18 5 weeks post   At this point ready to continue on her own  Reviewed home, gym program     Recommend continued short course of therapy (1-2 more visit)  See updated goals below  Understanding of Dx/Px/POC: excellent  Goals  ST : Decrease R shoulder pain to 2/10 at worst x 1 continuous week within 6 weeks-met         PROM: Improve R shoulder PROM to 120 degrees for flexion/scaption, ER to 45 degrees, IR to 45 degrees within 6 weeks  -met         AROM Improve R shoulder AROM flexion to 90 degrees in 6-8 weeks  -met         Pt to begin submaximal shoulder isometrics by 8  weeks post op -met         Improved FOTO self rated functional scale score, pt to note greater ease with dressing, return to driving within 6 weeks  -met    LT : Eliminate R shoulder pain x 1 continuous week within 12 weeks  -met         PROM: Improve R shoulder PROM to 170 degrees for flexion/scaption, ER to 90 degrees, IR to 75degrees within 12 weeks  -met         AROM :  Improve R shoulder AROM to 170 degrees for flexion/scaption, ER to 90 degrees, IR to T7 level within 16 weeks  -met         Strength: 5/5 R shoulder stength within 16 weeks   -partially met         Function: FOTO score to be at least 70 within 16-20 weeks  -not met         Independent with home exercise program - met        Plan  Planned therapy interventions: home exercise program  Treatment plan discussed with: patient  Plan details: Advised patient to continue with home exercise program which I reviewed with them today  Advised patient to contact me with any future questions or concerns regarding exercise  Pt is in agreement with discharge plan  Subjective Evaluation    History of Present Illness  Date of surgery: 2018  Mechanism of injury: Underwent a (R) shoulder arthroscopy on 16 for a supraspinatus repair (double row), subacromial decompression  Sandra Valencia tells me her R shoulder is feeling much better  Is mostly pain-free  Followed up with MD last week and was advised she can begin pickleball at the end of the month      She has some questions for me regarding return to gym program   Pain  No pain reported  Current pain ratin  At best pain ratin  At worst pain ratin  Location: past 48 hours    Social Support  Lives with: alone    Hand dominance: right    Patient Goals  Patient goals for therapy: decreased pain, independence with ADLs/IADLs, increased strength, return to sport/leisure activities and increased motion (Patient is making good progress towards personal goals )  Patient goal: return to playing pickleball, walking for exercise, working out in gym        Objective     Active Range of Motion     Right Shoulder   Flexion: 175 degrees   Abduction: 170 degrees   External rotation 90°: 90 degrees  Internal rotation BTB: T7     Passive Range of Motion     Right Shoulder   Normal passive range of motion    Scapular Mobility   Left Shoulder   Scapular Dyskinesis: none  Scapular mobility: WFL    Right Shoulder   Scapular Dyskinesis: none  Scapular mobility: WFL    Strength/Myotome Testing     Right Shoulder     Planes of Motion   Flexion: 5   Extension: 5   Abduction: 5   Adduction: 5   External rotation at 0°: 5 Internal rotation at 0°: 5     Isolated Muscles   Biceps: 5   Lower trapezius: 4+   Rhomboids: 5   Serratus anterior: 5   Triceps: 5     Tests     Right Shoulder   Negative crossover and empty can       General Comments     Shoulder Comments   Arthroscopic surgical incision sites are healed    No shoulder edema              Precautions: HTN, follow Dr Fernandez Settler protocol  Daily Treatment Diary   Manuals 9/13 9/17 9/25  10/1  10/8             PROM  R shoulder (No abduction, work in scaption) RG RG RG  RG RG              Manual posterior capsule stertch RG RG RG RG   RG                                                             Exercise Diary  9/13 9/17 9/24  10/1  10/8             UBE 3/3, L 4 0 3/3 L 4 0 3/3 L 4 0 3/3, L 4 0   3/3 L 4 0             Pulley flexion 3 min, 10 sec hold 3 min, 10 sec hold 3 min, 10 sec hold 3 min 10 sec hold NP              AROM scaption 1#   2 x 15 1#, 2 x 15 2#, 2 x 10  2# 2 x 10  2#, 2 x 10             AROM ABD 1#,   2 x 15 1#, 2 x 15 1#, 2 x 15  1#, 2 x 15 2#, 2 x 10              Tband Rows Blue, 2 x 15 Blue, 2 x 15 Blue, 2 x 15  Blue, 2 x 15  Blue, 2 x 15             Tband Extension Green  2 x15 Blue, 2 x 15 Blue, 2 x 15 Blue, 2 x 15   Blue, 2 x 15             Tband IR Blue  2 x 15 Blue, 2 x 15 Blue, 2 x 15 Blue, 2 x 15  Blue, 2 x 15              Tband ER Green  2 x15 Green, 2  x15 Blue, 2 x 15 Blue, 2 x 15  Blue, 2 x 15              Ball circles at wall 20 cw/ccw x 4 20 cw/ccw x 4 20 cw/ccw x 4  20 cw/ccw x 4  20 cw/ccw x 4             SL ER 2#  3 x 10 2#, 3 x 10 2#, 3 x 10 2#, 2 x 10  3#, 2 x 10             SL shoulder flexion/extension (slowly) 1# 2 x 15 1#  2 x 15 1#, 2 x 15  1# 2 x 15  2#, 2 x 10             SA punchups 3#  2 x 20 3#, 2 x 0 3#, 2 x 20 5#, 2 x 20  6#, 2  2x 20              Prone rhomboids 1#   2 x 15 1#, 2 x 15 1#, 2 x 15 1#, 2 x 15   2#, 2 x 10             Prone lower traps 1#   2 x 15 1#, 2 x 15 1#, 2 x 15 1#, 2 x 15  2#, 2 x 10              Prone extension 3#   2 x 15 3#, 2 x 15 3#, 2 x 15 3#, 2 x 15  4#, 2 x 10              Med ball up and out lift 4 4#  3 x 10 6 6#, 2 x 10 6 6#, 3 x 10  6 6#, 3 x 10  6 6#, 3 x 10             Rhythmic Stabilizations FF/Ext, HABd/HADd @ 90 degrees 3 x 10  Sleeper IR: 30 sec x 3 3 x 10, sleeper IR, 30 sec x 3 AAIR 10 sec x 5  3 x 10, sleeper IR, 30 sec x 3 3 x 10, sleeper IR, 30 sec x 3              Wall Pushups 2 x 10 3 x 10 3 x 10  3 x 10  kneeling x 10, wall, 2 x 10             Modalities 9/13                     Cryo R shoulder 10 min 10 min 10 min  10 min 10 min

## 2018-10-12 ENCOUNTER — EVALUATION (OUTPATIENT)
Dept: PHYSICAL THERAPY | Facility: CLINIC | Age: 68
End: 2018-10-12
Payer: MEDICARE

## 2018-10-12 DIAGNOSIS — M76.62 LEFT ACHILLES BURSITIS: Primary | ICD-10-CM

## 2018-10-12 PROCEDURE — G8979 MOBILITY GOAL STATUS: HCPCS | Performed by: PHYSICAL THERAPIST

## 2018-10-12 PROCEDURE — G8978 MOBILITY CURRENT STATUS: HCPCS | Performed by: PHYSICAL THERAPIST

## 2018-10-12 PROCEDURE — 97161 PT EVAL LOW COMPLEX 20 MIN: CPT | Performed by: PHYSICAL THERAPIST

## 2018-10-12 PROCEDURE — 97035 APP MDLTY 1+ULTRASOUND EA 15: CPT | Performed by: PHYSICAL THERAPIST

## 2018-10-12 NOTE — LETTER
October 15, 2018    Kirstin Rae [facility] [address]    Patient: Cally Seen   YOB: 1950   Date of Visit: 10/12/2018     Encounter Diagnosis     ICD-10-CM    1  Left Achilles bursitis M76 62        Dear Dr Tha Bolanos:    Please review the attached Plan of Care from [facility] recent visit  Please verify that you agree therapy should continue by signing the attached document and sending it back to our office  If you have any questions or concerns, please don't hesitate to call  Sincerely,    Pérez Ahuja, PT      Referring Provider:      I certify that I have read the below Plan of Care and certify the need for these services furnished under this plan of treatment while under my care  Kirstin Martin  [facility] [place] [address]  VIA Facsimile: [phone]          PT Evaluation     Today's date: 10/12/2018  Patient name: Cally Seen  : 1950  MRN: [id number]  Referring provider: Patti Yee  Dx:   Encounter Diagnosis     ICD-10-CM    1  Left Achilles bursitis M76 62                   Assessment    Assessment details: Patient is a 78 y o  female who  presents with pain,weakness associated with L plantar fasciitis, achilles tendinitis and bursitis  Functional limitations as a result of impairments  Patient may benefit from course of skilled physical therapy to address above listed impairments in an effort to improve function  Understanding of Dx/Px/POC: excellent   Prognosis: good    Goals  Short Term Goals:  1) Pain : Decrease L foot pain to 2/10 at worst x 1 continuous week within 2-3 weeks  2) Strength: Improved  strength to 5/5 for L ankle inversion within 2-3 weeks  3) Function: Improved FOTO score from IE within 2-3 weeks, patient to note greater ease of ambulation subjectively  LongTerm Goals:  1) Pain : Eliminate L foot  x 1 continuous week within 4-6 weeks    2) Function: Improved FOTO score to at least 65 ; no reported difficulty with ADLs within 4-5 weeks  3) Independent  with HEP within 4-6weeks  Plan  Patient would benefit from: skilled physical therapy  Planned modality interventions: TENS, ultrasound, cryotherapy and thermotherapy: hydrocollator packs  Planned therapy interventions: strengthening, stretching, balance/weight bearing training, home exercise program, manual therapy and massage  Other planned therapy interventions: taping techniques  Frequency: 2x week (x 4-6 weeks)  Plan of Care beginning date: 10/12/2018  Plan of Care expiration date: 2018  Treatment plan discussed with: patient        Subjective Evaluation    History of Present Illness  Mechanism of injury: Patient is a 78 y o  old female who presents for an initial outpatient physical therapy consultation regarding her L foot pain    She tells me plantar fascial pain started insidiously in 2018  She is wondering if playing pickleball in old sneakers contributed to onset  Gradually pain started to extend into area of L achilles  Received two injections into plantar fascia, one into achilles bursa with good relief of plantar discomfort, but minimal relief of bursal pain  Stair ambulation specifically aggravating  Laying in bed or reclincer with direct pressure to area also specifically aggravating  No has bene referred to try some therapy  Pain  Current pain ratin  At best pain ratin  At worst pain ratin    Patient Goals  Patient goals for therapy: decreased pain, increased strength and independence with ADLs/IADLs  Patient goal: return to pickleball        Objective     Static Posture     Ankle/Foot   Ankle/Foot (Left): Calcaneovalgus  Ankle/Foot (Right): Calcaneovalgus  Comments  (mild)    Tenderness   Left Ankle/Foot   Tenderness in the Achilles insertion (also TTP about 1 cm lateral to achilles insertion) and plantar fascia (medial calcaneal insertion)       Active Range of Motion   Left Hip   Normal active range of motion    Right Hip   Normal active range of motion  Left Knee   Flexion: WFL  Extension: WFL    Right Knee   Flexion: WFL  Extension: WFL  Left Ankle/Foot   Dorsiflexion (ke): 10 degrees   Dorsiflexion (kf): 10 degrees   Plantar flexion: 55 degrees   Inversion: 30 degrees   Eversion: 15 degrees   Great toe flexion: WFL  Great toe extension: WFL    Right Ankle/Foot   Dorsiflexion (ke): 10 degrees   Dorsiflexion (kf): 15 degrees   Plantar flexion: 50 degrees   Inversion: 30 degrees   Eversion: 15 degrees   Great toe flexion: WFL  Great toe extension: WFL    Passive Range of Motion   Left Ankle/Foot  Normal passive range of motion    Right Ankle/Foot  Normal passive range of motion    Joint Play   Left Ankle/Foot  Joints within functional limits are the talocrural joint and subtalar joint  Right Ankle/Foot  Joints within functional limits are the talocrural joint and subtalar joint  Strength/Myotome Testing     Left Hip   Planes of Motion   Flexion: 5  Extension: 5  Abduction: 5  Adduction: 5    Right Hip   Planes of Motion   Flexion: 5  Extension: 5  Abduction: 5  Adduction: 5    Left Knee   Flexion: 5  Extension: 5    Right Knee   Flexion: 5  Extension: 5    Left Ankle/Foot   Dorsiflexion: 5  Plantar flexion: 5  Inversion: 4+  Eversion: 5    Right Ankle/Foot   Dorsiflexion: 5  Inversion: 5  Eversion: 5    Tests   Left Ankle/Foot   Negative for anterior drawer  Right Ankle/Foot   Negative for anterior drawer       General Comments     Ankle/Foot Comments   No swelling noted      Flowsheet Rows      Most Recent Value   PT/OT G-Codes   Current Score  53   Projected Score  65   Assessment Type  Evaluation   G code set  Mobility: Walking & Moving Around   Mobility: Walking and Moving Around Current Status ()  CK   Mobility: Walking and Moving Around Goal Status ()  CJ          Precautions: none      Daily Treatment Diary       Manuals 10/12            IASTM L distal achilles, medial origin plantar fascia RG                                                   Exercise Diary              Strap HS/Gastroc stretch NV            Rocker Board F/B, S/S NV            Eccentric Calf strengthening L) NV                                                                                                                                                                                                                            K-TAPE L achilles  RG                                      Modalities             PUS L Achilles insertion, 3 Mhz 8 min            1 3 w/cmsq, 50%

## 2018-10-12 NOTE — PROGRESS NOTES
PT Evaluation     Today's date: 10/12/2018  Patient name: Arnol Marcos  : 1950  MRN: [id number]  Referring provider: Joao Stewart  Dx:   Encounter Diagnosis     ICD-10-CM    1  Left Achilles bursitis M76 62                   Assessment    Assessment details: Patient is a 78 y o  female who  presents with pain,weakness associated with L plantar fasciitis, achilles tendinitis and bursitis  Functional limitations as a result of impairments  Patient may benefit from course of skilled physical therapy to address above listed impairments in an effort to improve function  Understanding of Dx/Px/POC: excellent   Prognosis: good    Goals  Short Term Goals:  1) Pain : Decrease L foot pain to 2/10 at worst x 1 continuous week within 2-3 weeks  2) Strength: Improved  strength to 5/5 for L ankle inversion within 2-3 weeks  3) Function: Improved FOTO score from IE within 2-3 weeks, patient to note greater ease of ambulation subjectively  LongTerm Goals:  1) Pain : Eliminate L foot  x 1 continuous week within 4-6 weeks  2) Function: Improved FOTO score to at least 65 ; no reported difficulty with ADLs within 4-5 weeks  3) Independent  with HEP within 4-6weeks          Plan  Patient would benefit from: skilled physical therapy  Planned modality interventions: TENS, ultrasound, cryotherapy and thermotherapy: hydrocollator packs  Planned therapy interventions: strengthening, stretching, balance/weight bearing training, home exercise program, manual therapy and massage  Other planned therapy interventions: taping techniques  Frequency: 2x week (x 4-6 weeks)  Plan of Care beginning date: 10/12/2018  Plan of Care expiration date: 2018  Treatment plan discussed with: patient        Subjective Evaluation    History of Present Illness  Mechanism of injury: Patient is a 78 y o  old female who presents for an initial outpatient physical therapy consultation regarding her L foot pain    She tells me plantar fascial pain started insidiously in 2018  She is wondering if playing pickleball in old sneakers contributed to onset  Gradually pain started to extend into area of L achilles  Received two injections into plantar fascia, one into achilles bursa with good relief of plantar discomfort, but minimal relief of bursal pain  Stair ambulation specifically aggravating  Laying in bed or reclincer with direct pressure to area also specifically aggravating  No has bene referred to try some therapy  Pain  Current pain ratin  At best pain ratin  At worst pain ratin    Patient Goals  Patient goals for therapy: decreased pain, increased strength and independence with ADLs/IADLs  Patient goal: return to pickleball        Objective     Static Posture     Ankle/Foot   Ankle/Foot (Left): Calcaneovalgus  Ankle/Foot (Right): Calcaneovalgus  Comments  (mild)    Tenderness   Left Ankle/Foot   Tenderness in the Achilles insertion (also TTP about 1 cm lateral to achilles insertion) and plantar fascia (medial calcaneal insertion)  Active Range of Motion   Left Hip   Normal active range of motion    Right Hip   Normal active range of motion  Left Knee   Flexion: WFL  Extension: WFL    Right Knee   Flexion: WFL  Extension: WFL  Left Ankle/Foot   Dorsiflexion (ke): 10 degrees   Dorsiflexion (kf): 10 degrees   Plantar flexion: 55 degrees   Inversion: 30 degrees   Eversion: 15 degrees   Great toe flexion: WFL  Great toe extension: WFL    Right Ankle/Foot   Dorsiflexion (ke): 10 degrees   Dorsiflexion (kf): 15 degrees   Plantar flexion: 50 degrees   Inversion: 30 degrees   Eversion: 15 degrees   Great toe flexion: WFL  Great toe extension: WFL    Passive Range of Motion   Left Ankle/Foot  Normal passive range of motion    Right Ankle/Foot  Normal passive range of motion    Joint Play   Left Ankle/Foot  Joints within functional limits are the talocrural joint and subtalar joint       Right Ankle/Foot  Joints within functional limits are the talocrural joint and subtalar joint  Strength/Myotome Testing     Left Hip   Planes of Motion   Flexion: 5  Extension: 5  Abduction: 5  Adduction: 5    Right Hip   Planes of Motion   Flexion: 5  Extension: 5  Abduction: 5  Adduction: 5    Left Knee   Flexion: 5  Extension: 5    Right Knee   Flexion: 5  Extension: 5    Left Ankle/Foot   Dorsiflexion: 5  Plantar flexion: 5  Inversion: 4+  Eversion: 5    Right Ankle/Foot   Dorsiflexion: 5  Inversion: 5  Eversion: 5    Tests   Left Ankle/Foot   Negative for anterior drawer  Right Ankle/Foot   Negative for anterior drawer       General Comments     Ankle/Foot Comments   No swelling noted      Flowsheet Rows      Most Recent Value   PT/OT G-Codes   Current Score  53   Projected Score  65   Assessment Type  Evaluation   G code set  Mobility: Walking & Moving Around   Mobility: Walking and Moving Around Current Status ()  CK   Mobility: Walking and Moving Around Goal Status ()  CJ          Precautions: none      Daily Treatment Diary       Manuals 10/12            IASTM L distal achilles, medial origin plantar fascia RG                                                   Exercise Diary              Strap HS/Gastroc stretch NV            Rocker Board F/B, S/S NV            Eccentric Calf strengthening L) NV                                                                                                                                                                                                                            K-TAPE L achilles  RG                                      Modalities             PUS L Achilles insertion, 3 Mhz 8 min            1 3 w/cmsq, 50%

## 2018-10-15 ENCOUNTER — OFFICE VISIT (OUTPATIENT)
Dept: PHYSICAL THERAPY | Facility: CLINIC | Age: 68
End: 2018-10-15
Payer: MEDICARE

## 2018-10-15 DIAGNOSIS — M76.62 LEFT ACHILLES BURSITIS: Primary | ICD-10-CM

## 2018-10-15 PROCEDURE — 97035 APP MDLTY 1+ULTRASOUND EA 15: CPT | Performed by: PHYSICAL THERAPIST

## 2018-10-15 PROCEDURE — 97140 MANUAL THERAPY 1/> REGIONS: CPT | Performed by: PHYSICAL THERAPIST

## 2018-10-15 PROCEDURE — 97110 THERAPEUTIC EXERCISES: CPT | Performed by: PHYSICAL THERAPIST

## 2018-10-15 NOTE — PROGRESS NOTES
Daily Note     Today's date: 10/15/2018  Patient name: Estevan Looney  : 1950  MRN: [id number]  Referring provider: Lizette Bull DPM  Dx:   Encounter Diagnosis     ICD-10-CM    1  Left Achilles bursitis M76 62                   Subjective: Felt better the day after last PT session, but L heel pain returned after walking about a mile over the weekend  Objective: See treatment diary below  Progressed LE strengthening, stabilization training  Assessment: Tolerated treatment well  Patient exhibited good technique with therapeutic exercises Still early in rehab process  Patient would benefit from continued course of skilled physical therapy to address impairments in an effort to improve function  Plan: Continue per plan of care       Precautions: none        Daily Treatment Diary         Manuals 10/12  10/15                   IASTM L distal achilles, medial origin plantar fascia RG  RG                                                                                           Exercise Diary                        Strap HS/Gastroc stretch NV  30 sec x 3                   Rocker Board F/B, S/S NV  2  min each                   Eccentric Calf strengthening L) NV  2 x 10                                                                                                                                                                                                                                                                                                                                                                                                                   K-TAPE L achilles  RG  RG                                                                   Modalities                       PUS L Achilles insertion, 3 Mhz 8 min 8 min                   1 3 w/cmsq, 50%

## 2018-10-18 ENCOUNTER — OFFICE VISIT (OUTPATIENT)
Dept: PHYSICAL THERAPY | Facility: CLINIC | Age: 68
End: 2018-10-18
Payer: MEDICARE

## 2018-10-18 DIAGNOSIS — M76.62 LEFT ACHILLES BURSITIS: Primary | ICD-10-CM

## 2018-10-18 PROCEDURE — 97140 MANUAL THERAPY 1/> REGIONS: CPT | Performed by: PHYSICAL THERAPIST

## 2018-10-18 PROCEDURE — 97110 THERAPEUTIC EXERCISES: CPT | Performed by: PHYSICAL THERAPIST

## 2018-10-18 PROCEDURE — 97035 APP MDLTY 1+ULTRASOUND EA 15: CPT | Performed by: PHYSICAL THERAPIST

## 2018-10-18 RX ORDER — AMOXICILLIN 500 MG/1
CAPSULE ORAL
Refills: 2 | COMMUNITY
Start: 2018-10-01 | End: 2018-10-23

## 2018-10-18 RX ORDER — IBUPROFEN 800 MG/1
800 TABLET ORAL AS NEEDED
Refills: 2 | COMMUNITY
Start: 2018-10-02 | End: 2019-10-23 | Stop reason: ALTCHOICE

## 2018-10-18 NOTE — PROGRESS NOTES
Daily Note     Today's date: 10/18/2018  Patient name: Dwayne Sommer  : 1950  MRN: [id number]  Referring provider: Edgardo Mckeon DPM  Dx:   Encounter Diagnosis     ICD-10-CM    1  Left Achilles bursitis M76 62                   Subjective:Has not been walking for exercise since last visit and she thinks that heel feels a little better because of this  Objective: See treatment diary below  TTP L achilles tendon and just medial to tendon  Assessment: Tolerated session well  Still with unresolved pain  Patient would benefit from continued course of skilled physical therapy to address impairments in an effort to improve function  Plan: Continue per plan of care       Precautions: none        Daily Treatment Diary         Manuals 10/12  10/15  10/18                 IASTM L distal achilles, medial origin plantar fascia RG  RG  RG                                                                                         Exercise Diary                        Strap HS/Gastroc stretch NV  30 sec x 3  30 sec x 3                 Rocker Board F/B, S/S NV  2  min each  2 min each                 Eccentric Calf strengthening L) NV  2 x 10  2 x 10                                                                                                                                                                                                                                                                                                                                                                                                                 K-TAPE L achilles  RG  RG RG                                                                 Modalities                       PUS L Achilles insertion, 3 Mhz 8 min 8 min  8 min                 1 3 w/cmsq, 50%

## 2018-10-22 ENCOUNTER — OFFICE VISIT (OUTPATIENT)
Dept: PHYSICAL THERAPY | Facility: CLINIC | Age: 68
End: 2018-10-22
Payer: MEDICARE

## 2018-10-22 DIAGNOSIS — M76.62 LEFT ACHILLES BURSITIS: Primary | ICD-10-CM

## 2018-10-22 PROCEDURE — 97110 THERAPEUTIC EXERCISES: CPT | Performed by: PHYSICAL THERAPIST

## 2018-10-22 PROCEDURE — 97140 MANUAL THERAPY 1/> REGIONS: CPT | Performed by: PHYSICAL THERAPIST

## 2018-10-22 NOTE — PROGRESS NOTES
Daily Note     Today's date: 10/22/2018  Patient name: Estevan Looney  : 1950  MRN: [id number]  Referring provider: Silvia Haas  Dx:   Encounter Diagnosis     ICD-10-CM    1  Left Achilles bursitis M76 62                   Subjective:L heel pain worsened since last visit  Now having to take 800 mg ibuprofen  She tells me she is discouraged  Objective: See treatment diary below  TTP L achilles tendon and just medial to tendon  Assessment: Still with unresolved pain  May not be respsonding well to therapy  Plan: Continue per plan of care    If no significant + gains towards pain goals by the end of the week would discharge from therapy and refer back to MD     Precautions: none        Daily Treatment Diary         Manuals 10/12  10/15  10/18  10/22               IASTM L distal achilles, medial origin plantar fascia RG  RG  RG  RG                                                                                       Exercise Diary                        Strap HS/Gastroc stretch NV  30 sec x 3  30 sec x 3  30 sec x 3               Rocker Board F/B, S/S NV  2  min each  2 min each  2 min each               Eccentric Calf strengthening L) NV  2 x 10  2 x 10  3 x 10                                                                                                                                                                                                                                                                                                                                                                                                               K-TAPE L achilles  RG  RG RG  Calcaneus horizontally                                                               Modalities                       PUS L Achilles insertion, 3 Mhz 8 min 8 min  8 min  8 min               1 3 w/cmsq, 50%

## 2018-10-23 ENCOUNTER — OFFICE VISIT (OUTPATIENT)
Dept: FAMILY MEDICINE CLINIC | Facility: CLINIC | Age: 68
End: 2018-10-23
Payer: MEDICARE

## 2018-10-23 VITALS
RESPIRATION RATE: 17 BRPM | HEART RATE: 71 BPM | BODY MASS INDEX: 29.31 KG/M2 | DIASTOLIC BLOOD PRESSURE: 84 MMHG | TEMPERATURE: 97.4 F | WEIGHT: 193.38 LBS | HEIGHT: 68 IN | SYSTOLIC BLOOD PRESSURE: 142 MMHG | OXYGEN SATURATION: 98 %

## 2018-10-23 DIAGNOSIS — Z23 NEED FOR INFLUENZA VACCINATION: ICD-10-CM

## 2018-10-23 DIAGNOSIS — Z23 NEED FOR SHINGLES VACCINE: ICD-10-CM

## 2018-10-23 DIAGNOSIS — Z23 NEED FOR PNEUMOCOCCAL VACCINATION: ICD-10-CM

## 2018-10-23 DIAGNOSIS — I10 ESSENTIAL HYPERTENSION: Primary | ICD-10-CM

## 2018-10-23 DIAGNOSIS — E78.1 HYPERTRIGLYCERIDEMIA: ICD-10-CM

## 2018-10-23 DIAGNOSIS — Z12.31 ENCOUNTER FOR SCREENING MAMMOGRAM FOR BREAST CANCER: ICD-10-CM

## 2018-10-23 PROCEDURE — 90662 IIV NO PRSV INCREASED AG IM: CPT | Performed by: PHYSICIAN ASSISTANT

## 2018-10-23 PROCEDURE — 99213 OFFICE O/P EST LOW 20 MIN: CPT | Performed by: PHYSICIAN ASSISTANT

## 2018-10-23 PROCEDURE — G0009 ADMIN PNEUMOCOCCAL VACCINE: HCPCS | Performed by: PHYSICIAN ASSISTANT

## 2018-10-23 PROCEDURE — 90732 PPSV23 VACC 2 YRS+ SUBQ/IM: CPT | Performed by: PHYSICIAN ASSISTANT

## 2018-10-23 PROCEDURE — G0008 ADMIN INFLUENZA VIRUS VAC: HCPCS | Performed by: PHYSICIAN ASSISTANT

## 2018-10-23 NOTE — PROGRESS NOTES
Assessment/Plan:    Hypertension  Borderline today 142/84  Compliant on lisinopril HCTZ 20-12 5mg  Will continue to monitor  Hypertriglyceridemia  On fish oil  Needs lipid check  Will get FBW done before next appt       Diagnoses and all orders for this visit:    Essential hypertension  -     Comprehensive metabolic panel; Future  -     Lipid panel; Future    Hypertriglyceridemia  -     Comprehensive metabolic panel; Future  -     Lipid panel; Future    Encounter for screening mammogram for breast cancer  -     Mammo screening bilateral w 3d & cad; Future    Need for influenza vaccination  -     influenza vaccine, 3069-7605, high-dose, PF 0 5 mL, for patients 65 yr+ (FLUZONE HIGH-DOSE)    Need for pneumococcal vaccination  -     PNEUMOCOCCAL POLYSACCHARIDE VACCINE 23-VALENT =>3YO SQ IM    Need for shingles vaccine  -     SHINGRIX 50 MCG SUSR; Inject 5 mL into a muscle once for 1 dose Repeat dose in 2-6 months    Other orders  -     Discontinue: amoxicillin (AMOXIL) 500 mg capsule; Take by mouth  -     ibuprofen (MOTRIN) 800 mg tablet; Take 800 mg by mouth as needed          Patient is a 50-year-old female presenting today for med check for hypertension  Her blood pressure is a little elevated today and she is compliant on lisinopril HCTZ for now and she will remain on 20-12 0 5 mg a day  She does have diagnosis of hypertriglyceridemia and takes fish oil daily  We will check blood work fasting prior to her next appointment   With me in 6 months  She had right rotator cuff repair earlier this year and has been doing well and following with PT  However she does have some left foot pain seen by Podiatry and diagnosed with Achilles tendinitis and plantar fasciitis  She will be seeing PT for that and also has a planned follow-up in about a week with Podiatry  She is due for screening mammogram with order given today  Flu vaccine and Pneumovax 23 also administered today    Risks versus benefits of the new shingles immunization was also discussed and a prescription was given for that, advised to get done at the pharmacy  F/U 6 months with FBW prior  Chief Complaint   Patient presents with    Medication Check Up     no refills at this time  Would like Pneumovax and Flu Shot       Subjective:      Patient ID: Dione Johansen is a 78 y o  female  68y/o female here today for med check for HTN  Her surgery on right shoulder for rotator tear went well, continuing with PT  She has been feeling well, would like to review immunizations  Also seeing PT for bursitis in achilles and plantar fasciitis, as well as podiatry  She is compliant on lisinopril, denies cardiovascular sxs  The following portions of the patient's history were reviewed and updated as appropriate: allergies, current medications, past family history, past medical history, past social history, past surgical history and problem list     Review of Systems   Constitutional: Negative  Respiratory: Negative  Cardiovascular: Negative  Gastrointestinal: Negative  Genitourinary: Negative  Musculoskeletal:        As in HPI   Neurological: Negative  Psychiatric/Behavioral: Negative  Objective:      /84   Pulse 71   Temp (!) 97 4 °F (36 3 °C) (Tympanic)   Resp 17   Ht 5' 7 52" (1 715 m)   Wt 87 7 kg (193 lb 6 oz)   SpO2 98%   Breastfeeding? No   BMI 29 82 kg/m²          Physical Exam   Constitutional: She is oriented to person, place, and time  She appears well-developed and well-nourished  Neck: Neck supple  Normal carotid pulses present  Carotid bruit is not present  Cardiovascular: Normal rate, regular rhythm, normal heart sounds and normal pulses  Pulmonary/Chest: Effort normal and breath sounds normal    Lymphadenopathy:     She has no cervical adenopathy  Neurological: She is alert and oriented to person, place, and time  Psychiatric: She has a normal mood and affect  Vitals reviewed

## 2018-10-26 ENCOUNTER — OFFICE VISIT (OUTPATIENT)
Dept: PHYSICAL THERAPY | Facility: CLINIC | Age: 68
End: 2018-10-26
Payer: MEDICARE

## 2018-10-26 DIAGNOSIS — M76.62 LEFT ACHILLES BURSITIS: Primary | ICD-10-CM

## 2018-10-26 PROCEDURE — 97110 THERAPEUTIC EXERCISES: CPT | Performed by: PHYSICAL THERAPIST

## 2018-10-26 PROCEDURE — 97140 MANUAL THERAPY 1/> REGIONS: CPT | Performed by: PHYSICAL THERAPIST

## 2018-10-26 NOTE — PROGRESS NOTES
Daily Note     Today's date: 10/26/2018  Patient name: Ny Webber  : 1950  MRN: [id number]  Referring provider: Carina Cheney*  Dx:   Encounter Diagnosis     ICD-10-CM    1  Left Achilles bursitis M76 62                   Subjective: Had 48 period without heel pain , then pain came back when standing at [employer] soccer practice yesterday  Intensity still not as bad as last week  Objective: See treatment diary below  TTP L achilles tendon and just medial to tendon  Assessment: Improved subjective progress since last visit  Plan: Continue per plan of care  Will continue through next week as she is reporting improvement, then reassess      Precautions: none        Daily Treatment Diary         Manuals 10/12  10/15  10/18  10/22  10/26             IASTM L distal achilles, medial origin plantar fascia RG  RG  RG  RG  RG                                                                                     Exercise Diary                        Strap HS/Gastroc stretch NV  30 sec x 3  30 sec x 3  30 sec x 3  30 sec x 3             Rocker Board F/B, S/S NV  2  min each  2 min each  2 min each  2   Min each             Eccentric Calf strengthening L) NV  2 x 10  2 x 10  3 x 10 3 x 10                                                                                                                                                                                                                                                                                                                                                                                                             K-TAPE L achilles  RG  RG RG  Calcaneus horizontally   Calcaneus horizontally                                                             Modalities                       PUS L Achilles insertion, 3 Mhz 8 min 8 min  8 min  8 min  8 min             1 3 w/cmsq, 50%

## 2018-10-30 ENCOUNTER — OFFICE VISIT (OUTPATIENT)
Dept: PHYSICAL THERAPY | Facility: CLINIC | Age: 68
End: 2018-10-30
Payer: MEDICARE

## 2018-10-30 DIAGNOSIS — M76.62 LEFT ACHILLES BURSITIS: Primary | ICD-10-CM

## 2018-10-30 PROCEDURE — 97110 THERAPEUTIC EXERCISES: CPT | Performed by: PHYSICAL THERAPIST

## 2018-10-30 PROCEDURE — 97140 MANUAL THERAPY 1/> REGIONS: CPT | Performed by: PHYSICAL THERAPIST

## 2018-10-30 NOTE — PROGRESS NOTES
Daily Note     Today's date: 10/30/2018  Patient name: Puma Porter  : 1950  MRN: [id number]  Referring provider: Parish Miller*  Dx:   Encounter Diagnosis     ICD-10-CM    1  Left Achilles bursitis M76 62                   Subjective: No changes in pain since last visit  Direct pressure to heel still specifically aggravating  Objective: See treatment diary below  Progressed toe strengthening  TTP L achilles tendon and just medial to tendon  Assessment: Tolerated therex additions well  Still with unresolved pain negatively effecting quality of life and function  Plan: Continue per plan of care     Precautions: none        Daily Treatment Diary         Manuals 10/12  10/15  10/18  10/22  10/26  10/30           IASTM L distal achilles, medial origin plantar fascia RG  RG  RG  RG  RG  RG                                                                                   Exercise Diary                        Strap HS/Gastroc stretch NV  30 sec x 3  30 sec x 3  30 sec x 3  30 sec x 3 30 sec x 3           Rocker Board F/B, S/S NV  2  min each  2 min each  2 min each  2   Min each  2 min each           Eccentric Calf strengthening L) NV  2 x 10  2 x 10  3 x 10 3 x 10  3 x 10            Tband toe curls            Green, 2 x20                                                                                                                                                                                                                                                                                                                                                                                   K-TAPE L achilles  RG  RG RG  Calcaneus horizontally   Calcaneus horizontally  Calcaneus horizontally                                                           Modalities                       PUS L Achilles insertion, 3 Mhz 8 min 8 min  8 min  8 min  8 min  8 min           1 3 w/cmsq, 50%

## 2018-11-01 ENCOUNTER — OFFICE VISIT (OUTPATIENT)
Dept: PHYSICAL THERAPY | Facility: CLINIC | Age: 68
End: 2018-11-01
Payer: MEDICARE

## 2018-11-01 DIAGNOSIS — M76.62 LEFT ACHILLES BURSITIS: Primary | ICD-10-CM

## 2018-11-01 PROCEDURE — 97110 THERAPEUTIC EXERCISES: CPT | Performed by: PHYSICAL THERAPIST

## 2018-11-01 PROCEDURE — 97140 MANUAL THERAPY 1/> REGIONS: CPT | Performed by: PHYSICAL THERAPIST

## 2018-11-01 PROCEDURE — 97035 APP MDLTY 1+ULTRASOUND EA 15: CPT | Performed by: PHYSICAL THERAPIST

## 2018-11-01 NOTE — PROGRESS NOTES
Daily Note     Today's date: 2018  Patient name: Dayna Marcelino  : 1950  MRN: [id number]  Referring provider: Gilmer Duggan*  Dx:   Encounter Diagnosis     ICD-10-CM    1  Left Achilles bursitis M76 62                   Subjective: Less pain since last visit  Feels she is almost ready for return to [place]  Objective: See treatment diary below  Progressed functional strengthening  Assessment: Good progress this week in regards to decreased pain  Patient would benefit from continued course of skilled physical therapy to address impairments in an effort to improve function  Plan: Continue per plan of care     Precautions: none        Daily Treatment Diary         Manuals 10/12  10/15  10/18  10/22  10/26  10/30  11/1         IASTM L distal achilles, medial origin plantar fascia RG  RG  RG  RG  RG  RG  RG                                                                                 Exercise Diary                        Strap HS/Gastroc stretch NV  30 sec x 3  30 sec x 3  30 sec x 3  30 sec x 3 30 sec x 3  30 sec x 3         Rocker Board F/B, S/S NV  2  min each  2 min each  2 min each  2   Min each  2 min each  2 min each         Eccentric Calf strengthening L) NV  2 x 10  2 x 10  3 x 10 3 x 10  3 x 10  3 x 10          Tband toe curls            Green, 2 x20  Green, 2 x 20          Rocker board suats              x 10 each way                                                                                                                                                                                                                                                                                                                                                         K-TAPE L achilles  RG  RG RG  Calcaneus horizontally   Calcaneus horizontally  Calcaneus horizontally   Calcaneus horizontally                                                         Modalities                       PUS L Achilles insertion, 3 Mhz 8 min 8 min  8 min  8 min  8 min  8 min 8 min         1 3 w/cmsq, 50%

## 2018-11-06 ENCOUNTER — OFFICE VISIT (OUTPATIENT)
Dept: PHYSICAL THERAPY | Facility: CLINIC | Age: 68
End: 2018-11-06
Payer: MEDICARE

## 2018-11-06 DIAGNOSIS — M76.62 LEFT ACHILLES BURSITIS: Primary | ICD-10-CM

## 2018-11-06 PROCEDURE — 97110 THERAPEUTIC EXERCISES: CPT | Performed by: PHYSICAL THERAPIST

## 2018-11-06 PROCEDURE — 97140 MANUAL THERAPY 1/> REGIONS: CPT | Performed by: PHYSICAL THERAPIST

## 2018-11-06 NOTE — PROGRESS NOTES
Daily Note     Today's date: 2018  Patient name: Agapito Zazueta  : 1950  MRN: [id number]  Referring provider: Braxton Rosado*  Dx:   Encounter Diagnosis     ICD-10-CM    1  Left Achilles bursitis M76 62                   Subjective: No changes since last visit  Not as much pain with activity, but still having pain with direct pressure to heel  Had to take an ibuprofen after laying in bed for an hour last night s heel was throbbing  Is going to try return to pickleball this week  Objective: See treatment diary below  Assessment: Overall pain has decreased in frequency since starting therapy  Still with unresolved pain negatively effecting quality of life and function  Patient would benefit from continued course of skilled physical therapy to address impairments in an effort to improve function  Plan: Continue per plan of care  We will see how she handles return to pickleball this week    Precautions: none        Daily Treatment Diary         Manuals 10/12  10/15  10/18  10/22  10/26  10/30  11/1  11/6       IASTM L distal achilles, medial origin plantar fascia RG  RG  RG  RG  RG  RG  RG  RG                                                                               Exercise Diary                        Strap HS/Gastroc stretch NV  30 sec x 3  30 sec x 3  30 sec x 3  30 sec x 3 30 sec x 3  30 sec x 3  30 sec x 3       Rocker Board F/B, S/S NV  2  min each  2 min each  2 min each  2   Min each  2 min each  2 min each  x 2 min each       Eccentric Calf strengthening L) NV  2 x 10  2 x 10  3 x 10 3 x 10  3 x 10  3 x 10  3 x 10        Tband toe curls            Green, 2 x20  Green, 2 x 20  Green, 2 x 20        Rocker board suats              x 10 each way  x10 each way                                                                                                                                                                                                                                                                                                                                                     K-TAPE L achilles  RG  RG RG  Calcaneus horizontally   Calcaneus horizontally  Calcaneus horizontally   Calcaneus horizontally  calcaceus/horizontally                                                       Modalities                       PUS L Achilles insertion, 3 Mhz 8 min 8 min  8 min  8 min  8 min  8 min 8 min  8 min       1 3 w/cmsq, 50%

## 2018-11-09 ENCOUNTER — OFFICE VISIT (OUTPATIENT)
Dept: PHYSICAL THERAPY | Facility: CLINIC | Age: 68
End: 2018-11-09
Payer: MEDICARE

## 2018-11-09 DIAGNOSIS — M76.62 LEFT ACHILLES BURSITIS: Primary | ICD-10-CM

## 2018-11-09 PROCEDURE — 97140 MANUAL THERAPY 1/> REGIONS: CPT | Performed by: PHYSICAL THERAPIST

## 2018-11-09 PROCEDURE — 97110 THERAPEUTIC EXERCISES: CPT | Performed by: PHYSICAL THERAPIST

## 2018-11-09 NOTE — PROGRESS NOTES
Daily Note     Today's date: 2018  Patient name: Raman Roblero  : 1950  MRN: [id number]  Referring provider: Lenora Salinas*  Dx:   Encounter Diagnosis     ICD-10-CM    1  Left Achilles bursitis M76 62                   Subjective: Feeling less heel pain since last visit  Is making more of a conscious effort to avoid resting heel on recliner  Returned to pickleball this week , Could feel mild heel pain with lunging motions where achilles with put on stretch,    Objective: See treatment diary below  Assessment: Functional continues to improve  Patient would benefit from continued course of skilled physical therapy to address impairments in an effort to improve function  Plan: Continue per plan of care  Likely one more week of therapy    Precautions: none        Daily Treatment Diary         Manuals 10/12  10/15  10/18  10/22  10/26  10/30  11/1  11/6  11/9     IASTM L distal achilles, medial origin plantar fascia RG  RG  RG  RG  RG  RG  RG  RG  RG, distal medial achilles                                                                             Exercise Diary                        Strap HS/Gastroc stretch NV  30 sec x 3  30 sec x 3  30 sec x 3  30 sec x 3 30 sec x 3  30 sec x 3  30 sec x 3  30 sec x 3     Rocker Board F/B, S/S NV  2  min each  2 min each  2 min each  2   Min each  2 min each  2 min each  x 2 min each  x 2  Min each     Eccentric Calf strengthening L) NV  2 x 10  2 x 10  3 x 10 3 x 10  3 x 10  3 x 10  3 x 10  3 x 10      Tband toe curls            Green, 2 x20  Green, 2 x 20  Green, 2 x 20  green, 2 x 20      Rocker board squats              x 10 each way  x10 each way   x10 each way                                                                                                                                                                                                                                                                                                                                                     K-TAPE L achilles  RG  RG RG  Calcaneus horizontally   Calcaneus horizontally  Calcaneus horizontally   Calcaneus horizontally  calcaceus/horizontally  Calcaneus horizontally (1 stip)                                                     Modalities                       PUS L Achilles insertion, 3 Mhz 8 min 8 min  8 min  8 min  8 min  8 min 8 min  8 min  8 min     1 3 w/cmsq, 50%

## 2018-11-12 ENCOUNTER — OFFICE VISIT (OUTPATIENT)
Dept: PHYSICAL THERAPY | Facility: CLINIC | Age: 68
End: 2018-11-12
Payer: MEDICARE

## 2018-11-12 DIAGNOSIS — M76.62 LEFT ACHILLES BURSITIS: Primary | ICD-10-CM

## 2018-11-12 PROCEDURE — 97140 MANUAL THERAPY 1/> REGIONS: CPT

## 2018-11-12 PROCEDURE — 97110 THERAPEUTIC EXERCISES: CPT

## 2018-11-12 NOTE — PROGRESS NOTES
Daily Note     Today's date: 2018  Patient name: Aiyana Browne  : 1950  MRN: [id number]  Referring provider: Trina Bianchi*  Dx:   Encounter Diagnosis     ICD-10-CM    1  Left Achilles bursitis M76 62        Start Time: 1100  Stop Time: 1150  Total time in clinic (min): 50 minutes    Subjective: Patient reports no real change since last visit  Patient continues to play pickleball  Patient states her heel doesn't hurt as much because she is conscious of her positioning  Patient to see her doctor next week  Objective: See treatment diary below  Assessment: Patient demonstrated good form during TE  Patient is tender posterior to her lateral malleoli  Plan: Continue per plan of care     Precautions: none      Daily Treatment Diary       Manuals 10/12  10/15  10/18  10/22  10/26  10/30  11/1  11/6  11/9  11/12   IASTM L distal achilles, medial origin plantar fascia RG  RG  RG  RG  RG  RG  RG  RG  RG, distal medial achilles  JM                                                                           Exercise Diary                        Strap HS/Gastroc stretch NV  30 sec x 3  30 sec x 3  30 sec x 3  30 sec x 3 30 sec x 3  30 sec x 3  30 sec x 3  30 sec x 3  30 sec x3   Rocker Board F/B, S/S NV  2  min each  2 min each  2 min each  2   Min each  2 min each  2 min each  x 2 min each  x 2  Min each  x2 min each   Eccentric Calf strengthening L) NV  2 x 10  2 x 10  3 x 10 3 x 10  3 x 10  3 x 10  3 x 10  3 x 10  3x10    Tband toe curls            Green, 2 x20  Green, 2 x 20  Green, 2 x 20  green, 2 x 20  green  2x20    Rocker board squats              x 10 each way  x10 each way   x10 each way  x10 ea way                                                                                                                                                                                                                                                                                                                                                   K-TAPE L achilles  RG  RG RG  Calcaneus horizontally   Calcaneus horizontally  Calcaneus horizontally   Calcaneus horizontally  calcaceus/horizontally  Calcaneus horizontally (1 stip)  Calcaneus horizontally (1 strip)                                                   Modalities                       PUS L Achilles insertion, 3 Mhz 8 min 8 min  8 min  8 min  8 min  8 min 8 min  8 min  8 min  8 min   1 3 w/cmsq, 50%

## 2018-11-15 ENCOUNTER — OFFICE VISIT (OUTPATIENT)
Dept: PHYSICAL THERAPY | Facility: CLINIC | Age: 68
End: 2018-11-15
Payer: MEDICARE

## 2018-11-15 DIAGNOSIS — M76.62 LEFT ACHILLES BURSITIS: Primary | ICD-10-CM

## 2018-11-15 PROCEDURE — G8979 MOBILITY GOAL STATUS: HCPCS | Performed by: PHYSICAL THERAPIST

## 2018-11-15 PROCEDURE — 97110 THERAPEUTIC EXERCISES: CPT | Performed by: PHYSICAL THERAPIST

## 2018-11-15 PROCEDURE — 97035 APP MDLTY 1+ULTRASOUND EA 15: CPT | Performed by: PHYSICAL THERAPIST

## 2018-11-15 PROCEDURE — G8980 MOBILITY D/C STATUS: HCPCS | Performed by: PHYSICAL THERAPIST

## 2018-11-15 PROCEDURE — 97140 MANUAL THERAPY 1/> REGIONS: CPT | Performed by: PHYSICAL THERAPIST

## 2018-11-15 NOTE — PROGRESS NOTES
PT Discharge    Today's date: 11/15/2018  Patient name: Chance Cruz  : 1950  MRN: [id number]  Referring provider: Jacklyn Molina DPM  Dx:   Encounter Diagnosis     ICD-10-CM    1  Left Achilles bursitis M76 62                   Assessment    Assessment details: Patient is a 78 y o  female who  presents with pain,weakness associated with L plantar fasciitis, achilles tendinitis and bursitis  Plantar fasciitis resolved  Still with unresolved pain at achilles bursa  I can palpate a gritty are/ledge in area of pain  At this point I think we have run our course with therapy  I am referring her back to MD, possible role for further testing  Understanding of Dx/Px/POC: excellent   Prognosis: fair    Goals  Short Term Goals:  1) Pain : Decrease L foot pain to 2/10 at worst x 1 continuous week within 2-3 weeks  -not met  2) Strength: Improved  strength to 5/5 for L ankle inversion within 2-3 weeks  -met  3) Function: Improved FOTO score from IE within 2-3 weeks, patient to note greater ease of ambulation subjectively  -met    LongTerm Goals:  1) Pain : Eliminate L foot  x 1 continuous week within 4-6 weeks  -not met  2) Function: Improved FOTO score to at least 65 ; no reported difficulty with ADLs within 4-5 weeks  -partially met  3) Independent  with HEP within 4-6weeks  -met        Plan  Plan of Care beginning date: 10/12/2018  Plan of Care expiration date: 2018        Subjective Evaluation    History of Present Illness  Mechanism of injury: Initial visit 10/12/18: Patient is a 78 y o  old female who presents for an initial outpatient physical therapy consultation regarding her L foot pain    She tells me plantar fascial pain started insidiously in 2018  She is wondering if playing pickleball in old sneakers contributed to onset  Gradually pain started to extend into area of L achilles    Received two injections into plantar fascia, one into achilles bursa with good relief of plantar discomfort, but minimal relief of bursal pain  Stair ambulation specifically aggravating  Laying in bed or reclincer with direct pressure to area also specifically aggravating  UPDATE 18:  Noting improvement in heel pain since starting therapy  Has been able to return to pickleball  Still has pain when direct pressure is applied to back of heel, but has bene trying to avoid these positions  Pain  Current pain ratin  At best pain ratin  At worst pain rating: 3    Patient Goals  Patient goals for therapy: decreased pain, increased strength and independence with ADLs/IADLs (pt has made progress towards goals)  Patient goal: return to pickleball        Objective     Static Posture     Ankle/Foot   Ankle/Foot (Left): Calcaneovalgus  Ankle/Foot (Right): Calcaneovalgus  Comments  (mild)    Tenderness   Left Ankle/Foot   Tenderness in the Achilles insertion (also TTP about 1 cm lateral to achilles insetion, there is a palpable ledge in same area)  Plantar fascia: medial calcaneal insertion  Active Range of Motion   Left Hip   Normal active range of motion    Right Hip   Normal active range of motion  Left Knee   Flexion: WFL  Extension: WFL    Right Knee   Flexion: WFL  Extension: WFL  Left Ankle/Foot   Dorsiflexion (ke): 10 degrees   Dorsiflexion (kf): 10 degrees   Plantar flexion: 55 degrees   Inversion: 30 degrees   Eversion: 15 degrees   Great toe flexion: WFL  Great toe extension: WFL    Right Ankle/Foot   Dorsiflexion (ke): 10 degrees   Dorsiflexion (kf): 15 degrees   Plantar flexion: 50 degrees   Inversion: 30 degrees   Eversion: 15 degrees   Great toe flexion: WFL  Great toe extension: WFL    Passive Range of Motion   Left Ankle/Foot  Normal passive range of motion    Right Ankle/Foot  Normal passive range of motion    Joint Play   Left Ankle/Foot  Joints within functional limits are the talocrural joint and subtalar joint       Right Ankle/Foot  Joints within functional limits are the talocrural joint and subtalar joint  Strength/Myotome Testing     Left Hip   Planes of Motion   Flexion: 5  Extension: 5  Abduction: 5  Adduction: 5    Right Hip   Planes of Motion   Flexion: 5  Extension: 5  Abduction: 5  Adduction: 5    Left Knee   Flexion: 5  Extension: 5    Right Knee   Flexion: 5  Extension: 5    Left Ankle/Foot   Dorsiflexion: 5  Plantar flexion: 5  Inversion: 5  Eversion: 5    Right Ankle/Foot   Dorsiflexion: 5  Plantar flexion: 5  Inversion: 5  Eversion: 5    Tests   Left Ankle/Foot   Negative for anterior drawer  Right Ankle/Foot   Negative for anterior drawer       Ambulation     Observational Gait     Additional Observational Gait Details  Gait is normal on level ground    General Comments     Ankle/Foot Comments   No swelling noted      Flowsheet Rows      Most Recent Value   PT/OT G-Codes   Current Score  73   Projected Score  65   Assessment Type  Discharge   G code set  Mobility: Walking & Moving Around   Mobility: Walking and Moving Around Goal Status ()  CJ   Mobility: Walking and Moving Around Discharge Status ()  CJ          Precautions: none       Daily Treatment Diary       Manuals 11/15            IASTM L distal achilles RG                                                      Exercise Diary              Strap HS/Gastroc stretch 30 sec x 3            Rocker Board F/B, S/S 2 min each            Eccentric Calf strengthening L) 3 x 10             Tband toe curls green, 2 x 20             Rocker board squats X 20 each                                                                                                                                                                                                                K-TAPE L achilles  Calcaneus                                        Modalities             PUS L Achilles insertion, 3 Mhz 8 min            1 3 w/cmsq, 50%

## 2018-11-15 NOTE — LETTER
November 15, 2018    Shanel Eaton DPM  [address] [place] [address]    Patient: Tatyana Dugan   YOB: 1950   Date of Visit: 11/15/2018     Encounter Diagnosis     ICD-10-CM    1  Left Achilles bursitis M76 62        Dear Dr Nayeli Arnold:    Please review the attached Plan of Care from [facility] recent visit  Please verify that you agree therapy should continue by signing the attached document and sending it back to our office  If you have any questions or concerns, please don't hesitate to call  Sincerely,    Reji Erickson, PT      Referring Provider:      I certify that I have read the below Plan of Care and certify the need for these services furnished under this plan of treatment while under my care  Shanel Eaton DPM  Simone Govea  [place] [address] [place] [address]  VIA Facsimile: [phone]          PT Discharge    Today's date: 11/15/2018  Patient name: Tatyana Dugan  : 1950  MRN: [id number]  Referring provider: Amelia Haynes DPM  Dx:   Encounter Diagnosis     ICD-10-CM    1  Left Achilles bursitis M76 62                   Assessment    Assessment details: Patient is a 78 y o  female who  presents with pain,weakness associated with L plantar fasciitis, achilles tendinitis and bursitis  Plantar fasciitis resolved  Still with unresolved pain at achilles bursa  I can palpate a gritty are/ledge in area of pain  At this point I think we have run our course with therapy  I am referring her back to MD, possible role for further testing  Understanding of Dx/Px/POC: excellent   Prognosis: fair    Goals  Short Term Goals:  1) Pain : Decrease L foot pain to 2/10 at worst x 1 continuous week within 2-3 weeks  -not met  2) Strength: Improved  strength to 5/5 for L ankle inversion within 2-3 weeks  -met  3) Function: Improved FOTO score from IE within 2-3 weeks, patient to note greater ease of ambulation subjectively  -met    LongTerm Goals:  1) Pain : Eliminate L foot  x 1 continuous week within 4-6 weeks  -not met  2) Function: Improved FOTO score to at least 65 ; no reported difficulty with ADLs within 4-5 weeks  -partially met  3) Independent  with HEP within 4-6weeks  -met        Plan  Plan of Care beginning date: 10/12/2018  Plan of Care expiration date: 2018        Subjective Evaluation    History of Present Illness  Mechanism of injury: Initial visit 10/12/18: Patient is a 78 y o  old female who presents for an initial outpatient physical therapy consultation regarding her L foot pain    She tells me plantar fascial pain started insidiously in 2018  She is wondering if playing pickleball in old sneakers contributed to onset  Gradually pain started to extend into area of L achilles  Received two injections into plantar fascia, one into achilles bursa with good relief of plantar discomfort, but minimal relief of bursal pain  Stair ambulation specifically aggravating  Laying in bed or reclincer with direct pressure to area also specifically aggravating  UPDATE 18:  Noting improvement in heel pain since starting therapy  Has been able to return to pickleball  Still has pain when direct pressure is applied to back of heel, but has bene trying to avoid these positions  Pain  Current pain ratin  At best pain ratin  At worst pain rating: 3    Patient Goals  Patient goals for therapy: decreased pain, increased strength and independence with ADLs/IADLs (pt has made progress towards goals)  Patient goal: return to pickleball        Objective     Static Posture     Ankle/Foot   Ankle/Foot (Left): Calcaneovalgus  Ankle/Foot (Right): Calcaneovalgus  Comments  (mild)    Tenderness   Left Ankle/Foot   Tenderness in the Achilles insertion (also TTP about 1 cm lateral to achilles insetion, there is a palpable ledge in same area)  Plantar fascia: medial calcaneal insertion       Active Range of Motion   Left Hip   Normal active range of motion    Right Hip   Normal active range of motion  Left Knee   Flexion: WFL  Extension: WFL    Right Knee   Flexion: WFL  Extension: WFL  Left Ankle/Foot   Dorsiflexion (ke): 10 degrees   Dorsiflexion (kf): 10 degrees   Plantar flexion: 55 degrees   Inversion: 30 degrees   Eversion: 15 degrees   Great toe flexion: WFL  Great toe extension: WFL    Right Ankle/Foot   Dorsiflexion (ke): 10 degrees   Dorsiflexion (kf): 15 degrees   Plantar flexion: 50 degrees   Inversion: 30 degrees   Eversion: 15 degrees   Great toe flexion: WFL  Great toe extension: WFL    Passive Range of Motion   Left Ankle/Foot  Normal passive range of motion    Right Ankle/Foot  Normal passive range of motion    Joint Play   Left Ankle/Foot  Joints within functional limits are the talocrural joint and subtalar joint  Right Ankle/Foot  Joints within functional limits are the talocrural joint and subtalar joint  Strength/Myotome Testing     Left Hip   Planes of Motion   Flexion: 5  Extension: 5  Abduction: 5  Adduction: 5    Right Hip   Planes of Motion   Flexion: 5  Extension: 5  Abduction: 5  Adduction: 5    Left Knee   Flexion: 5  Extension: 5    Right Knee   Flexion: 5  Extension: 5    Left Ankle/Foot   Dorsiflexion: 5  Plantar flexion: 5  Inversion: 5  Eversion: 5    Right Ankle/Foot   Dorsiflexion: 5  Plantar flexion: 5  Inversion: 5  Eversion: 5    Tests   Left Ankle/Foot   Negative for anterior drawer  Right Ankle/Foot   Negative for anterior drawer       Ambulation     Observational Gait     Additional Observational Gait Details  Gait is normal on level ground    General Comments     Ankle/Foot Comments   No swelling noted      Flowsheet Rows      Most Recent Value   PT/OT G-Codes   Current Score  73   Projected Score  65   Assessment Type  Discharge   G code set  Mobility: Walking & Moving Around   Mobility: Walking and Moving Around Goal Status ()  CJ   Mobility: Walking and Moving Around Discharge Status ()  Rohan Hurtado Precautions: none       Daily Treatment Diary       Manuals 11/15            IASTM L distal achilles RG                                                      Exercise Diary              Strap HS/Gastroc stretch 30 sec x 3            Rocker Board F/B, S/S 2 min each            Eccentric Calf strengthening L) 3 x 10             Tband toe curls green, 2 x 20             Rocker board squats X 20 each                                                                                                                                                                                                                K-TAPE L achilles  Calcaneus                                        Modalities             PUS L Achilles insertion, 3 Mhz 8 min            1 3 w/cmsq, 50%

## 2019-01-15 DIAGNOSIS — I10 ESSENTIAL HYPERTENSION: ICD-10-CM

## 2019-01-15 RX ORDER — LISINOPRIL AND HYDROCHLOROTHIAZIDE 20; 12.5 MG/1; MG/1
TABLET ORAL
Qty: 90 TABLET | Refills: 1 | Status: SHIPPED | OUTPATIENT
Start: 2019-01-15 | End: 2019-07-16 | Stop reason: SDUPTHER

## 2019-04-08 ENCOUNTER — TELEPHONE (OUTPATIENT)
Dept: FAMILY MEDICINE CLINIC | Facility: CLINIC | Age: 69
End: 2019-04-08

## 2019-04-23 ENCOUNTER — OFFICE VISIT (OUTPATIENT)
Dept: FAMILY MEDICINE CLINIC | Facility: CLINIC | Age: 69
End: 2019-04-23
Payer: MEDICARE

## 2019-04-23 VITALS
DIASTOLIC BLOOD PRESSURE: 80 MMHG | HEART RATE: 68 BPM | WEIGHT: 190.38 LBS | OXYGEN SATURATION: 98 % | BODY MASS INDEX: 30.59 KG/M2 | RESPIRATION RATE: 17 BRPM | HEIGHT: 66 IN | TEMPERATURE: 97.1 F | SYSTOLIC BLOOD PRESSURE: 126 MMHG

## 2019-04-23 DIAGNOSIS — E66.9 OBESITY (BMI 30.0-34.9): ICD-10-CM

## 2019-04-23 DIAGNOSIS — E78.1 HYPERTRIGLYCERIDEMIA: ICD-10-CM

## 2019-04-23 DIAGNOSIS — I10 ESSENTIAL HYPERTENSION: ICD-10-CM

## 2019-04-23 DIAGNOSIS — Z00.00 MEDICARE ANNUAL WELLNESS VISIT, SUBSEQUENT: Primary | ICD-10-CM

## 2019-04-23 PROBLEM — E66.811 OBESITY (BMI 30.0-34.9): Status: ACTIVE | Noted: 2019-04-23

## 2019-04-23 PROBLEM — N95.0 PMB (POSTMENOPAUSAL BLEEDING): Status: ACTIVE | Noted: 2019-04-16

## 2019-04-23 PROCEDURE — 99213 OFFICE O/P EST LOW 20 MIN: CPT | Performed by: PHYSICIAN ASSISTANT

## 2019-04-23 PROCEDURE — G0439 PPPS, SUBSEQ VISIT: HCPCS | Performed by: PHYSICIAN ASSISTANT

## 2019-07-16 DIAGNOSIS — I10 ESSENTIAL HYPERTENSION: ICD-10-CM

## 2019-07-16 RX ORDER — LISINOPRIL AND HYDROCHLOROTHIAZIDE 20; 12.5 MG/1; MG/1
TABLET ORAL
Qty: 90 TABLET | Refills: 0 | Status: SHIPPED | OUTPATIENT
Start: 2019-07-16 | End: 2019-10-16 | Stop reason: SDUPTHER

## 2019-10-16 DIAGNOSIS — I10 ESSENTIAL HYPERTENSION: ICD-10-CM

## 2019-10-16 RX ORDER — LISINOPRIL AND HYDROCHLOROTHIAZIDE 20; 12.5 MG/1; MG/1
TABLET ORAL
Qty: 90 TABLET | Refills: 0 | Status: SHIPPED | OUTPATIENT
Start: 2019-10-16 | End: 2019-10-23 | Stop reason: SDUPTHER

## 2019-10-23 ENCOUNTER — OFFICE VISIT (OUTPATIENT)
Dept: FAMILY MEDICINE CLINIC | Facility: CLINIC | Age: 69
End: 2019-10-23
Payer: MEDICARE

## 2019-10-23 VITALS
TEMPERATURE: 97.2 F | DIASTOLIC BLOOD PRESSURE: 86 MMHG | OXYGEN SATURATION: 99 % | SYSTOLIC BLOOD PRESSURE: 136 MMHG | WEIGHT: 191.2 LBS | HEIGHT: 67 IN | BODY MASS INDEX: 30.01 KG/M2 | HEART RATE: 63 BPM

## 2019-10-23 DIAGNOSIS — Z23 NEED FOR INFLUENZA VACCINATION: ICD-10-CM

## 2019-10-23 DIAGNOSIS — I10 ESSENTIAL HYPERTENSION: Primary | ICD-10-CM

## 2019-10-23 DIAGNOSIS — E78.1 HYPERTRIGLYCERIDEMIA: ICD-10-CM

## 2019-10-23 DIAGNOSIS — M25.572 CHRONIC PAIN OF LEFT ANKLE: ICD-10-CM

## 2019-10-23 DIAGNOSIS — G89.29 CHRONIC PAIN OF LEFT ANKLE: ICD-10-CM

## 2019-10-23 PROCEDURE — 99214 OFFICE O/P EST MOD 30 MIN: CPT | Performed by: NURSE PRACTITIONER

## 2019-10-23 PROCEDURE — 90662 IIV NO PRSV INCREASED AG IM: CPT | Performed by: NURSE PRACTITIONER

## 2019-10-23 PROCEDURE — G0008 ADMIN INFLUENZA VIRUS VAC: HCPCS | Performed by: NURSE PRACTITIONER

## 2019-10-23 RX ORDER — LISINOPRIL AND HYDROCHLOROTHIAZIDE 20; 12.5 MG/1; MG/1
1 TABLET ORAL DAILY
Qty: 30 TABLET | Refills: 5 | Status: SHIPPED | OUTPATIENT
Start: 2019-10-23 | End: 2020-01-13

## 2019-10-23 NOTE — PROGRESS NOTES
[facility]    ASSESSMENT AND PLAN     Presents today for six-month checkup  Physical assess today as below  Health maintenance reviewed  Due for labs 04/2020  Immunizations up-to-date with exception of flu  Will administer today  Up-to-date with gyn/Pap, mammo  Breast biopsy 06/2019  Benign  Up-to-date with dental and vision  Re-evaluate 6 months, sooner if needed    1  Essential hypertension  BP appears controlled today on current prednisone I had 20-12 5 daily  /86  Denies any chest pain/pressure/SOB/palpitations  No change to treatment  Rx refilled    - lisinopril-hydrochlorothiazide (PRINZIDE,ZESTORETIC) 20-12 5 MG per tablet; Take 1 tablet by mouth daily  Dispense: 30 tablet; Refill: 5    2  Hypertriglyceridemia  Triglycerides mildly elevated 4/2019:168  Reassess in April  Possibly consider red yeast rice      3  Chronic pain of left ankle  Complains of chronic left ankle pain- left lateral malleolus  Assessment benign today with the exception of some mild pain with deep palpation of that area  Evaluated by both Podiatry and Ortho  Offered 2nd opinion through [facility]  Patient will consider  Reviewed RICE for symptom management    4  Need for influenza vaccination  Administered today    - influenza vaccine, 5771-5306, high-dose, PF 0 5 mL ([address])            SUBJECTIVE       Patient ID: Noemí Manley is a 80 y o  female  Chief Complaint   Patient presents with    Follow-up     6 month follow up hypertension       HISTORY OF PRESENT ILLNESS    Patient presents today for 6 month check  Taking medication as prescribed  Denies any chest pain/pressure/shortness breath/palpitations  Only complaint today is of left ankle pain  Chronic  Has been evaluated by Podiatry and Ortho  Was told it is likely an overgrowth of bone  No pain with walking, only when pressure applied to the outer side of her ankle    No other concerns today        The following portions of the patient's history were reviewed and updated as appropriate: allergies, current medications, past family history, past medical history, past social history, past surgical history and problem list     REVIEW OF SYSTEMS  Review of Systems   Constitutional: Negative  HENT: Negative  Respiratory: Negative  Cardiovascular: Negative  Gastrointestinal: Negative  Genitourinary: Negative  Musculoskeletal: Positive for arthralgias (Left ankle)  Neurological: Negative  Psychiatric/Behavioral: Negative  OBJECTIVE      VITAL SIGNS  /86 (BP Location: Right arm, Patient Position: Sitting, Cuff Size: Adult)   Pulse 63   Temp (!) 97 2 °F (36 2 °C)   Ht 5' 7" (1 702 m)   Wt 86 7 kg (191 lb 3 2 oz)   SpO2 99%   BMI 29 95 kg/m²     CURRENT MEDICATIONS    Current Outpatient Medications:     Cholecalciferol (VITAMIN D3) 2000 units capsule, Take 1 tablet by mouth daily, Disp: , Rfl:     estradiol (ESTRACE) 0 1 mg/g vaginal cream, Insert 2 g into the vagina daily, Disp: , Rfl:     lisinopril-hydrochlorothiazide (PRINZIDE,ZESTORETIC) 20-12 5 MG per tablet, Take 1 tablet by mouth daily, Disp: 30 tablet, Rfl: 5    Omega-3 Fatty Acids (FISH OIL) 1,000 mg, Take 1,000 mg by mouth daily  , Disp: , Rfl:       PHYSICAL EXAMINATION   Physical Exam   Constitutional: She is oriented to person, place, and time  Vital signs are normal  She appears well-developed and well-nourished  HENT:   Head: Normocephalic  Right Ear: Hearing, tympanic membrane, external ear and ear canal normal    Left Ear: Hearing, tympanic membrane, external ear and ear canal normal    Nose: Nose normal    Mouth/Throat: Oropharynx is clear and moist and mucous membranes are normal    Eyes: Pupils are equal, round, and reactive to light  Conjunctivae are normal    Neck: Full passive range of motion without pain  Carotid bruit is not present  No thyromegaly present  Cardiovascular: Normal rate and regular rhythm     Negative for lower extremity edema   Pulmonary/Chest: Effort normal and breath sounds normal  No respiratory distress  Musculoskeletal: Normal range of motion  Left ankle: She exhibits normal range of motion, no swelling, no ecchymosis and normal pulse  Lateral malleolus: Tenderness with palpation/pressure  Nothing with ambulation  Lymphadenopathy:        Head (right side): No submental, no submandibular, no tonsillar, no preauricular, no posterior auricular and no occipital adenopathy present  Head (left side): No submental, no submandibular, no tonsillar, no preauricular, no posterior auricular and no occipital adenopathy present  She has no cervical adenopathy  Neurological: She is alert and oriented to person, place, and time  Skin: Skin is warm, dry and intact  Psychiatric: She has a normal mood and affect  Nursing note and vitals reviewed

## 2020-01-12 DIAGNOSIS — I10 ESSENTIAL HYPERTENSION: ICD-10-CM

## 2020-01-13 RX ORDER — LISINOPRIL AND HYDROCHLOROTHIAZIDE 20; 12.5 MG/1; MG/1
1 TABLET ORAL DAILY
Qty: 90 TABLET | Refills: 0 | Status: SHIPPED | OUTPATIENT
Start: 2020-01-13 | End: 2020-04-07

## 2020-04-07 DIAGNOSIS — I10 ESSENTIAL HYPERTENSION: ICD-10-CM

## 2020-04-07 RX ORDER — LISINOPRIL AND HYDROCHLOROTHIAZIDE 20; 12.5 MG/1; MG/1
TABLET ORAL
Qty: 30 TABLET | Refills: 0 | Status: SHIPPED | OUTPATIENT
Start: 2020-04-07 | End: 2020-04-09 | Stop reason: SDUPTHER

## 2020-04-09 DIAGNOSIS — I10 ESSENTIAL HYPERTENSION: ICD-10-CM

## 2020-04-09 RX ORDER — LISINOPRIL AND HYDROCHLOROTHIAZIDE 20; 12.5 MG/1; MG/1
1 TABLET ORAL DAILY
Qty: 90 TABLET | Refills: 0 | Status: SHIPPED | OUTPATIENT
Start: 2020-04-09 | End: 2020-12-29

## 2020-05-21 ENCOUNTER — TELEPHONE (OUTPATIENT)
Dept: FAMILY MEDICINE CLINIC | Facility: CLINIC | Age: 70
End: 2020-05-21

## 2020-05-21 DIAGNOSIS — Z13.6 SCREENING FOR CARDIOVASCULAR CONDITION: ICD-10-CM

## 2020-05-21 DIAGNOSIS — Z13.29 SCREENING FOR THYROID DISORDER: ICD-10-CM

## 2020-05-21 DIAGNOSIS — E78.1 HYPERTRIGLYCERIDEMIA: ICD-10-CM

## 2020-05-21 DIAGNOSIS — I10 ESSENTIAL HYPERTENSION: Primary | ICD-10-CM

## 2020-06-03 ENCOUNTER — OFFICE VISIT (OUTPATIENT)
Dept: FAMILY MEDICINE CLINIC | Facility: CLINIC | Age: 70
End: 2020-06-03
Payer: MEDICARE

## 2020-06-03 ENCOUNTER — TELEPHONE (OUTPATIENT)
Dept: ADMINISTRATIVE | Facility: OTHER | Age: 70
End: 2020-06-03

## 2020-06-03 VITALS
HEIGHT: 67 IN | HEART RATE: 69 BPM | RESPIRATION RATE: 16 BRPM | WEIGHT: 190.6 LBS | TEMPERATURE: 97.8 F | DIASTOLIC BLOOD PRESSURE: 88 MMHG | BODY MASS INDEX: 29.91 KG/M2 | SYSTOLIC BLOOD PRESSURE: 144 MMHG | OXYGEN SATURATION: 97 %

## 2020-06-03 DIAGNOSIS — I10 ESSENTIAL HYPERTENSION: Primary | ICD-10-CM

## 2020-06-03 DIAGNOSIS — E66.9 OBESITY (BMI 30.0-34.9): ICD-10-CM

## 2020-06-03 DIAGNOSIS — E78.1 HYPERTRIGLYCERIDEMIA: ICD-10-CM

## 2020-06-03 PROCEDURE — 4040F PNEUMOC VAC/ADMIN/RCVD: CPT | Performed by: NURSE PRACTITIONER

## 2020-06-03 PROCEDURE — 99214 OFFICE O/P EST MOD 30 MIN: CPT | Performed by: NURSE PRACTITIONER

## 2020-06-03 PROCEDURE — 1160F RVW MEDS BY RX/DR IN RCRD: CPT | Performed by: NURSE PRACTITIONER

## 2020-06-03 PROCEDURE — 1036F TOBACCO NON-USER: CPT | Performed by: NURSE PRACTITIONER

## 2020-06-03 PROCEDURE — 3077F SYST BP >= 140 MM HG: CPT | Performed by: NURSE PRACTITIONER

## 2020-06-03 PROCEDURE — 3079F DIAST BP 80-89 MM HG: CPT | Performed by: NURSE PRACTITIONER

## 2020-06-03 RX ORDER — CLOBETASOL PROPIONATE 0.5 MG/G
OINTMENT TOPICAL
COMMUNITY
Start: 2020-05-27

## 2020-06-24 ENCOUNTER — OFFICE VISIT (OUTPATIENT)
Dept: FAMILY MEDICINE CLINIC | Facility: CLINIC | Age: 70
End: 2020-06-24
Payer: MEDICARE

## 2020-06-24 VITALS
WEIGHT: 192.2 LBS | OXYGEN SATURATION: 97 % | TEMPERATURE: 97.7 F | HEART RATE: 96 BPM | SYSTOLIC BLOOD PRESSURE: 128 MMHG | DIASTOLIC BLOOD PRESSURE: 82 MMHG | BODY MASS INDEX: 30.17 KG/M2 | HEIGHT: 67 IN | RESPIRATION RATE: 16 BRPM

## 2020-06-24 DIAGNOSIS — R35.0 URINARY FREQUENCY: ICD-10-CM

## 2020-06-24 DIAGNOSIS — N30.01 ACUTE CYSTITIS WITH HEMATURIA: Primary | ICD-10-CM

## 2020-06-24 LAB
SL AMB  POCT GLUCOSE, UA: ABNORMAL
SL AMB LEUKOCYTE ESTERASE,UA: ABNORMAL
SL AMB POCT BILIRUBIN,UA: ABNORMAL
SL AMB POCT BLOOD,UA: ABNORMAL
SL AMB POCT CLARITY,UA: ABNORMAL
SL AMB POCT COLOR,UA: YELLOW
SL AMB POCT KETONES,UA: ABNORMAL
SL AMB POCT NITRITE,UA: ABNORMAL
SL AMB POCT PH,UA: 5.5
SL AMB POCT SPECIFIC GRAVITY,UA: 1.02
SL AMB POCT URINE PROTEIN: ABNORMAL
SL AMB POCT UROBILINOGEN: 0.2

## 2020-06-24 PROCEDURE — 3079F DIAST BP 80-89 MM HG: CPT | Performed by: PHYSICIAN ASSISTANT

## 2020-06-24 PROCEDURE — 99213 OFFICE O/P EST LOW 20 MIN: CPT | Performed by: PHYSICIAN ASSISTANT

## 2020-06-24 PROCEDURE — 81003 URINALYSIS AUTO W/O SCOPE: CPT | Performed by: PHYSICIAN ASSISTANT

## 2020-06-24 PROCEDURE — 87086 URINE CULTURE/COLONY COUNT: CPT | Performed by: PHYSICIAN ASSISTANT

## 2020-06-24 PROCEDURE — 1160F RVW MEDS BY RX/DR IN RCRD: CPT | Performed by: PHYSICIAN ASSISTANT

## 2020-06-24 PROCEDURE — 3074F SYST BP LT 130 MM HG: CPT | Performed by: PHYSICIAN ASSISTANT

## 2020-06-24 PROCEDURE — 4040F PNEUMOC VAC/ADMIN/RCVD: CPT | Performed by: PHYSICIAN ASSISTANT

## 2020-06-24 PROCEDURE — 87077 CULTURE AEROBIC IDENTIFY: CPT | Performed by: PHYSICIAN ASSISTANT

## 2020-06-24 PROCEDURE — 1036F TOBACCO NON-USER: CPT | Performed by: PHYSICIAN ASSISTANT

## 2020-06-24 PROCEDURE — 87186 SC STD MICRODIL/AGAR DIL: CPT | Performed by: PHYSICIAN ASSISTANT

## 2020-06-24 RX ORDER — NITROFURANTOIN 25; 75 MG/1; MG/1
100 CAPSULE ORAL 2 TIMES DAILY
Qty: 14 CAPSULE | Refills: 0 | Status: SHIPPED | OUTPATIENT
Start: 2020-06-24 | End: 2020-07-01

## 2020-06-26 LAB
BACTERIA UR CULT: ABNORMAL
BACTERIA UR CULT: ABNORMAL

## 2020-07-14 ENCOUNTER — TELEPHONE (OUTPATIENT)
Dept: FAMILY MEDICINE CLINIC | Facility: CLINIC | Age: 70
End: 2020-07-14

## 2020-07-14 DIAGNOSIS — N30.01 ACUTE CYSTITIS WITH HEMATURIA: Primary | ICD-10-CM

## 2020-07-14 RX ORDER — NITROFURANTOIN 25; 75 MG/1; MG/1
100 CAPSULE ORAL 2 TIMES DAILY
Qty: 10 CAPSULE | Refills: 0 | Status: SHIPPED | OUTPATIENT
Start: 2020-07-14 | End: 2020-07-19

## 2020-07-14 NOTE — TELEPHONE ENCOUNTER
I will send another course of Macrobid, next time this occurs she will need an appointment   Thank you

## 2020-07-14 NOTE — TELEPHONE ENCOUNTER
Voicemail from patient stating she thinks she has another UTI and is requesting another prescription for  Macrobid  She was seen 6/24/20  She uses [facility] on [address]  [employer]

## 2020-08-17 ENCOUNTER — OFFICE VISIT (OUTPATIENT)
Dept: FAMILY MEDICINE CLINIC | Facility: CLINIC | Age: 70
End: 2020-08-17
Payer: MEDICARE

## 2020-08-17 VITALS
DIASTOLIC BLOOD PRESSURE: 76 MMHG | RESPIRATION RATE: 17 BRPM | SYSTOLIC BLOOD PRESSURE: 124 MMHG | WEIGHT: 186.6 LBS | HEART RATE: 90 BPM | BODY MASS INDEX: 29.29 KG/M2 | OXYGEN SATURATION: 97 % | HEIGHT: 67 IN

## 2020-08-17 DIAGNOSIS — R30.0 DYSURIA: Primary | ICD-10-CM

## 2020-08-17 LAB
SL AMB  POCT GLUCOSE, UA: ABNORMAL
SL AMB LEUKOCYTE ESTERASE,UA: ABNORMAL
SL AMB POCT BILIRUBIN,UA: ABNORMAL
SL AMB POCT BLOOD,UA: ABNORMAL
SL AMB POCT CLARITY,UA: CLEAR
SL AMB POCT COLOR,UA: YELLOW
SL AMB POCT KETONES,UA: ABNORMAL
SL AMB POCT NITRITE,UA: ABNORMAL
SL AMB POCT PH,UA: 5.5
SL AMB POCT SPECIFIC GRAVITY,UA: >=1.03
SL AMB POCT URINE PROTEIN: ABNORMAL
SL AMB POCT UROBILINOGEN: 0.2

## 2020-08-17 PROCEDURE — 1160F RVW MEDS BY RX/DR IN RCRD: CPT | Performed by: FAMILY MEDICINE

## 2020-08-17 PROCEDURE — 3074F SYST BP LT 130 MM HG: CPT | Performed by: FAMILY MEDICINE

## 2020-08-17 PROCEDURE — 3078F DIAST BP <80 MM HG: CPT | Performed by: FAMILY MEDICINE

## 2020-08-17 PROCEDURE — 1036F TOBACCO NON-USER: CPT | Performed by: FAMILY MEDICINE

## 2020-08-17 PROCEDURE — 87086 URINE CULTURE/COLONY COUNT: CPT | Performed by: FAMILY MEDICINE

## 2020-08-17 PROCEDURE — 99213 OFFICE O/P EST LOW 20 MIN: CPT | Performed by: FAMILY MEDICINE

## 2020-08-17 PROCEDURE — 4040F PNEUMOC VAC/ADMIN/RCVD: CPT | Performed by: FAMILY MEDICINE

## 2020-08-17 PROCEDURE — 81003 URINALYSIS AUTO W/O SCOPE: CPT | Performed by: FAMILY MEDICINE

## 2020-08-17 RX ORDER — SULFAMETHOXAZOLE AND TRIMETHOPRIM 800; 160 MG/1; MG/1
1 TABLET ORAL EVERY 12 HOURS SCHEDULED
Qty: 14 TABLET | Refills: 0 | Status: SHIPPED | OUTPATIENT
Start: 2020-08-17 | End: 2020-08-24

## 2020-08-17 NOTE — PROGRESS NOTES
Assessment/Plan:  Patient is 80-year-old female seen for urinary symptoms  This has been recurrent  She also complains of vaginal dryness  I did start her on antibiotics for the urine symptoms  Also discussed about topicals for vaginal dryness  She cannot take estrogen secondary to history of blood clots  In the past she had seen a physician who had prescribed creams for menopause symptoms through saliva testing  I did tell her that I had other patient see Dr Mango Howell who advertises that he deals with hormonal issues  He is not a [address] though  I did ask patient to repeat the culture after her antibiotic is done  Diagnoses and all orders for this visit:    Dysuria  -     POCT urine dip auto non-scope  -     sulfamethoxazole-trimethoprim (BACTRIM DS) 800-160 mg per tablet; Take 1 tablet by mouth every 12 (twelve) hours for 7 days  -     Cancel: Urine culture; Future  -     UA (URINE) with reflex to Scope; Future  -     Urine culture; Future  -     Urine culture          Subjective:   Chief Complaint   Patient presents with    Urinary Tract Infection        Patient ID: Tavia Wynn is a 77 y o  female  Woke up in the middle of the night with urgency  Also issues with vaginal dryness  The following portions of the patient's history were reviewed and updated as appropriate: allergies, current medications, past family history, past medical history, past social history, past surgical history and problem list     Review of Systems   Constitutional: Negative for chills and fever  Gastrointestinal: Negative  Genitourinary: Positive for urgency  Vaginal dryness   Psychiatric/Behavioral: Negative  Objective:      /76 (BP Location: Left arm, Patient Position: Sitting)   Pulse 90   Resp 17   Ht 5' 7" (1 702 m)   Wt 84 6 kg (186 lb 9 6 oz)   SpO2 97%   BMI 29 23 kg/m²          Physical Exam  Vitals signs and nursing note reviewed     Constitutional: General: She is not in acute distress  Neck:      Thyroid: No thyromegaly  Cardiovascular:      Rate and Rhythm: Normal rate and regular rhythm  Heart sounds: Normal heart sounds  Pulmonary:      Effort: Pulmonary effort is normal       Breath sounds: Normal breath sounds  Abdominal:      General: Bowel sounds are normal       Palpations: Abdomen is soft  Tenderness: There is abdominal tenderness (  Slight suprapubic tenderness)  Skin:     General: Skin is warm and dry  Neurological:      Mental Status: She is alert and oriented to person, place, and time     Psychiatric:         Mood and Affect: Mood normal

## 2020-08-19 LAB
BACTERIA UR CULT: ABNORMAL
BACTERIA UR CULT: ABNORMAL

## 2020-08-28 ENCOUNTER — APPOINTMENT (OUTPATIENT)
Dept: LAB | Facility: CLINIC | Age: 70
End: 2020-08-28
Payer: MEDICARE

## 2020-08-28 DIAGNOSIS — R30.0 DYSURIA: ICD-10-CM

## 2020-08-28 LAB
BACTERIA UR QL AUTO: ABNORMAL /HPF
BILIRUB UR QL STRIP: NEGATIVE
CLARITY UR: ABNORMAL
COLOR UR: YELLOW
GLUCOSE UR STRIP-MCNC: NEGATIVE MG/DL
HGB UR QL STRIP.AUTO: NEGATIVE
KETONES UR STRIP-MCNC: NEGATIVE MG/DL
LEUKOCYTE ESTERASE UR QL STRIP: ABNORMAL
MUCOUS THREADS UR QL AUTO: ABNORMAL
NITRITE UR QL STRIP: NEGATIVE
NON-SQ EPI CELLS URNS QL MICRO: ABNORMAL /HPF
PH UR STRIP.AUTO: 5.5 [PH]
PROT UR STRIP-MCNC: NEGATIVE MG/DL
RBC #/AREA URNS AUTO: ABNORMAL /HPF
SP GR UR STRIP.AUTO: 1.02 (ref 1–1.03)
UROBILINOGEN UR QL STRIP.AUTO: 0.2 E.U./DL
WBC #/AREA URNS AUTO: ABNORMAL /HPF

## 2020-08-28 PROCEDURE — 87086 URINE CULTURE/COLONY COUNT: CPT

## 2020-08-28 PROCEDURE — 81001 URINALYSIS AUTO W/SCOPE: CPT

## 2020-08-29 LAB — BACTERIA UR CULT: NORMAL

## 2020-09-08 ENCOUNTER — TELEPHONE (OUTPATIENT)
Dept: FAMILY MEDICINE CLINIC | Facility: CLINIC | Age: 70
End: 2020-09-08

## 2020-09-08 NOTE — TELEPHONE ENCOUNTER
I would normally recommend patient be seen to issue a physical therapy referral but in [place] I believe she can go to physical therapy initially without a referral for her initial evaluation  I am not sure if that is true with every insurance or Medicare

## 2020-09-08 NOTE — TELEPHONE ENCOUNTER
PT SAID PHYSICAL THERAPY TOLD HER SHE NEEDED AN ORDER BEFORE SCHEDULING HER FOR THERAPY  I DID SPEAK TO TAWNY, SHE SAID FOR JUST THE INITIAL SCREENING THEY CAN JUST DO A SCREENING WITHOUT AN ORDER AND AFTER THAT SHE WOULD STILL NEED US  TO SIGN THE ORDERS FOR THE TREATMENT IS HOW I UNDERSTOOD IT  I DID TELL THE PT THAT THE  DOCTORS PREFER TO EVALUATE THE PROBLEM BEFORE REFERRING  I TOLD HER I WOULD ASK FIRST  SHE DOESN'T WANT TO MAKE 2 APPTS FOR THIS    WHAT DO YOU WANT TO DO?

## 2020-09-08 NOTE — TELEPHONE ENCOUNTER
She can just go to PT for the initial evaluation  They will send us orders electronically and if appropriate I will sign off on them at that time  Patient does not need to come in to see me before her initial evaluation and if PT agrees that treatment is appropriate she will not need to come back in for me to sign the orders

## 2020-09-08 NOTE — TELEPHONE ENCOUNTER
PT HAS AN APPT WITH YOU IN December FOR A 6 MOS CHECK  I'M NOT SURE SHE EVER SAW YOU  SHE SAW DEANA LAST TIME SO I AM NOT SURE WHO TO SEND THIS TO   PT STATES SHE IS HAVING NECK PAIN  SHE JUST WANTS AN ORDER PUT IN FOR PHYSICAL THERAPY, AT [facility], SO SHE CAN MAKE AN APPT    WHAT DO YOU WANT TO DO?

## 2020-09-11 ENCOUNTER — OFFICE VISIT (OUTPATIENT)
Dept: PHYSICAL THERAPY | Facility: CLINIC | Age: 70
End: 2020-09-11
Payer: MEDICARE

## 2020-09-11 DIAGNOSIS — M54.2 PAIN OF CERVICAL FACET JOINT: Primary | ICD-10-CM

## 2020-09-11 PROCEDURE — 97161 PT EVAL LOW COMPLEX 20 MIN: CPT | Performed by: PHYSICAL THERAPIST

## 2020-09-11 NOTE — PROGRESS NOTES
PT Evaluation     Today's date: 2020  Patient name: Jose Harry  : 1950  MRN: [id number]  Referring provider: Ciara Menezes, PT   CC:  Dr Alexander Soliman DO  Dx:   Encounter Diagnosis     ICD-10-CM    1  Pain of cervical facet joint  M54 2                   Assessment  Assessment details: Patient is a 77 y o  female who  presents with pain, range of motion loss, associated with L sided cervical facet dysfunction  No flags on exam   Functional limitations as a result of impairments  Pt would benefit from course of skilled physical therapy to address above listed impairment in an effort to improve function  Understanding of Dx/Px/POC: excellent  Goals  Short Term Goals:  1) Pain : Decrease Cervical  pain to 2/10 at worst x 1 continuous week within 2-3 weeks  2) ROM: Improve ROM by at least 6 degrees for all noted as limited up to full within 2-3 weeks  3)  Function: Improved FOTO score from IE within 2-3 weeks (49 @ IE) patient to note greater ease with activities of daily living within 2-3 weeks  LongTerm Goals:  1) Pain : Eliminate cervical pain x 1 continuous week within 4-6 weeks  2) ROM: Improve ROM to full within 4-6 weeks  3) Function: Improved FOTO score to at least 67 within 6 weeks, no difficulty with ADLs as they pertain to neck within 6 weeks  4) Independent with home exercise program within 6 weeks  Plan  Patient would benefit from: skilled PT  Planned modality interventions: cryotherapy, TENS, thermotherapy: hydrocollator packs, traction and ultrasound  Planned therapy interventions: ADL retraining, home exercise program, functional ROM exercises, massage, manual therapy, joint mobilization, patient education, postural training, therapeutic activities, therapeutic exercise and therapeutic training  Frequency: 2-3x's per week x 4-6 weeks    Plan of Care beginning date: 2020  Plan of Care expiration date: 10/23/2020  Treatment plan discussed with: patient        Subjective Evaluation    History of Present Illness  Mechanism of injury: Patient is a 77 y o  old female who presents for an initial outpatient physical therapy consultation regarding their neck pain  PMH significant for HTN  L sided nek pain for about the past three weeks,  epscially with turning head to L and reading paper  Feel pain came on after sleeping awkwardly on couch  Neck frequently gets "stuck "      Pain interrupts sleep  Has tried OTC medication without good relief  Denies UE paresthesias, states, "it just won't go away "          Pain  Current pain ratin  At best pain ratin  At worst pain ratin  Quality: sharp  Relieving factors: heat    Social Support    Employment status: not working  Exercise history: weight lifting, pickelball severeal times per week        Diagnostic Tests  No diagnostic tests performed  Patient Goals  Patient goals for therapy: decreased pain, increased motion and independence with ADLs/IADLs          Objective     Static Posture     Comments  Cervical AROM:    Flexion:35 degrees, L sided cervical pain  Extension: 30 degrees, L sided cervical pain  R SB: 40 degrees  L SB: 25 degrees, L sided cervical pain  R ROT: 80 degrees, L sided upper trapezius pullling  L ROT: 65 degrees, L sided cervical pain    Segmental Mobility:Decreased L facet closing C5-C6    Palpation:  Tender to palpation L upper trapezius >  TP C5, C6 > L levator scapulae    UE Myotomes:5/5  UE sensation: Intact to light touch    ULTT:(-) bilaterally    Special Testing:  Cervical Compression:(-)  Cervical Distraction: (-)    Alar ligament (-)  Transverse ligament (-)          Flowsheet Rows      Most Recent Value   PT/OT G-Codes   Current Score  76   Projected Score  77             Precautions: HTN      Manuals 20            Manual L upper trap stretch NV            Cervical side glides NV                                      Neuro Re-Ed Ther Ex             UBE NV            Table pull L upper trap stretch NV            Supine nodding into towel roll NV            Deep neck flexor lift NV            SNAGS: look to R NV                                                   Ther Activity                                       Gait Training                                       Modalities             C/S Mechanical Traction 10 min  22#            IE RG

## 2020-09-14 ENCOUNTER — OFFICE VISIT (OUTPATIENT)
Dept: PHYSICAL THERAPY | Facility: CLINIC | Age: 70
End: 2020-09-14
Payer: MEDICARE

## 2020-09-14 DIAGNOSIS — M54.2 PAIN OF CERVICAL FACET JOINT: Primary | ICD-10-CM

## 2020-09-14 PROCEDURE — 97140 MANUAL THERAPY 1/> REGIONS: CPT | Performed by: PHYSICAL THERAPIST

## 2020-09-14 PROCEDURE — 97012 MECHANICAL TRACTION THERAPY: CPT

## 2020-09-14 PROCEDURE — 97110 THERAPEUTIC EXERCISES: CPT

## 2020-09-14 NOTE — PROGRESS NOTES
Daily Note     Today's date: 2020  Patient name: Rossy Gross  : 1950  MRN: [id number]  Referring provider: Patricia Rankin, PT  Dx:   Encounter Diagnosis     ICD-10-CM    1  Pain of cervical facet joint  M54 2        Start Time: 1450  Stop Time: 1530  Total time in clinic (min): 40 minutes    Subjective: Pt reports feeling "a little better" since LV  States she went on a 25 mile bike ride over the weekend, which did not seem to bother her neck  Objective: See treatment diary below      Assessment: Tolerated treatment well  Initiated program as noted below, focusing on flexibility and strength of cervical spine musculature  Occasional verbal cues required for proper form and intensity with newly assigned exercise this visit, but was able to self correct once cues provided  Patient would benefit from continued PT to further improve strength and reduce frequency of symptoms, in effort to maximize function  Plan: Continue per plan of care  Monitor response to initial treatment NV       Precautions: HTN      Manuals 20           Manual L upper trap stretch NV RG           Cervical side glides NV RG                                     Neuro Re-Ed                                                                                                        Ther Ex             UBE NV 2/2  f/b           Table pull L upper trap stretch NV 20"x4           Supine nodding into towel roll NV 5"x10           Deep neck flexor lift NV 5"x10           SNAGS: look to R NV 5"x10                                                  Ther Activity                                       Gait Training                                       Modalities             C/S Mechanical Traction 10 min  22# 10 min  22#           IE EMILY

## 2020-09-18 ENCOUNTER — OFFICE VISIT (OUTPATIENT)
Dept: PHYSICAL THERAPY | Facility: CLINIC | Age: 70
End: 2020-09-18
Payer: MEDICARE

## 2020-09-18 DIAGNOSIS — M54.2 PAIN OF CERVICAL FACET JOINT: Primary | ICD-10-CM

## 2020-09-18 PROCEDURE — 97140 MANUAL THERAPY 1/> REGIONS: CPT | Performed by: PHYSICAL THERAPIST

## 2020-09-18 PROCEDURE — 97110 THERAPEUTIC EXERCISES: CPT | Performed by: PHYSICAL THERAPIST

## 2020-09-18 PROCEDURE — 97012 MECHANICAL TRACTION THERAPY: CPT | Performed by: PHYSICAL THERAPIST

## 2020-09-18 NOTE — PROGRESS NOTES
Daily Note     Today's date: 2020  Patient name: Opal Forbes  : 1950  MRN: [id number]  Referring provider: Myla Nixno PT  Dx:   Encounter Diagnosis     ICD-10-CM    1  Pain of cervical facet joint  M54 2                   Subjective: Art Stovall tells me she is not doing as well today as she was last visit  Did some light shoulder pressing in gym yesterday and feels this has flared up her neck pain  Objective: See treatment diary below  Progressed neck flexor strengthening  Added in thoracic mobilizations to tight upper thoracic spine      Assessment: Tolerated treatment well  Patient exhibited good technique with therapeutic exercises   Still with restricted L cervical facets  Patient would benefit from continued course of skilled physical therapy to address impairments in an effort to improve function  Plan: Continue per plan of care        Precautions: HTN      Manuals 20          Manual L upper trap stretch NV RG RG          Cervical side glides NV RG RG          Thoracic mobilizations in prone (screw technique) IV   T4-T7  RG                       Neuro Re-Ed                                                                                                        Ther Ex             UBE NV 2/2  f/b 3/3          Table pull L upper trap stretch NV 20"x4 30sec x 3          Supine nodding into towel roll NV 5"x10 5sec x 10, then 5sec x 10 with tband shoulder ER          Deep neck flexor lift NV 5"x10 5sec x 10          SNAGS: look to R NV 5"x10 10sec x 10          Scap squeezes   5sec x 20                                    Ther Activity                                       Gait Training                                       Modalities             C/S Mechanical Traction 10 min  22# 10 min  22# 10 min  28#          IE RG

## 2020-09-22 ENCOUNTER — OFFICE VISIT (OUTPATIENT)
Dept: PHYSICAL THERAPY | Facility: CLINIC | Age: 70
End: 2020-09-22
Payer: MEDICARE

## 2020-09-22 DIAGNOSIS — M54.2 PAIN OF CERVICAL FACET JOINT: Primary | ICD-10-CM

## 2020-09-22 PROCEDURE — 97140 MANUAL THERAPY 1/> REGIONS: CPT | Performed by: PHYSICAL THERAPIST

## 2020-09-22 PROCEDURE — 97012 MECHANICAL TRACTION THERAPY: CPT | Performed by: PHYSICAL THERAPIST

## 2020-09-22 PROCEDURE — 97110 THERAPEUTIC EXERCISES: CPT | Performed by: PHYSICAL THERAPIST

## 2020-09-22 NOTE — PROGRESS NOTES
Daily Note     Today's date: 2020  Patient name: Lorie Small  : 1950  MRN: [id number]  Referring provider: Ciara Harley, PT  Dx:   Encounter Diagnosis     ICD-10-CM    1  Pain of cervical facet joint  M54 2        Start Time: 909  Stop Time: 133  Total time in clinic (min): 44 minutes    Subjective: Zarina Steel tells me neck pain is about the same  She is not noting any long term carryover from PT session to session  Objective: See treatment diary below  Comments  Cervical AROM:     Flexion:35 degrees, L sided cervical pain  Extension: 35 degrees, L sided cervical pain  R SB: 40 degrees  L SB: 30 degrees, L sided cervical pain  R ROT: 80 degrees, L sided upper trapezius pullling  L ROT: 65 degrees, L sided cervical pain        Palpation:  Tender to palpation L upper trapezius >  TP C5, C6 > L levator scapulae     UE Myotomes:5/5  UE sensation: Intact to light touch     ULTT:(-) bilaterally     Special Testing:  Cervical Compression:(-)  Cervical Distraction: (-)     Alar ligament (-)  Transverse ligament (-)         Assessment: Facet mediated pain, persistent despite therapy  Plan: At this time will put P T on hold and refer to PCP for further workup  Possible role for pain management/injection or medication, or both       Precautions: HTN      Manuals 20           Manual L upper trap stretch NV RG RG RG         Cervical side glides NV RG RG RG         Thoracic mobilizations in prone (screw technique) IV   T4-T7  RG T4-T7                      Neuro Re-Ed                                                                                                        Ther Ex             UBE NV 2/2  f/b 3/3 3/3         Table pull L upper trap stretch NV 20"x4 30sec x 3 30sec x 3         Supine nodding into towel roll NV 5"x10 5sec x 10, then 5sec x 10 with tband shoulder ER 5sec x 10 with tband shoulder ER         Deep neck flexor lift NV 5"x10 5sec x 10 5sec x 10         SNAGS: look to R NV 5"x10 10sec x 10 10se x 10         Scap squeezes   5sec x 20 5sec x 30                                   Ther Activity                                       Gait Training                                       Modalities             C/S Mechanical Traction 10 min  22# 10 min  22# 10 min  28# 10 min  28#         IE RG

## 2020-09-25 ENCOUNTER — APPOINTMENT (OUTPATIENT)
Dept: PHYSICAL THERAPY | Facility: CLINIC | Age: 70
End: 2020-09-25
Payer: MEDICARE

## 2020-09-28 ENCOUNTER — APPOINTMENT (OUTPATIENT)
Dept: PHYSICAL THERAPY | Facility: CLINIC | Age: 70
End: 2020-09-28
Payer: MEDICARE

## 2020-10-12 ENCOUNTER — APPOINTMENT (OUTPATIENT)
Dept: RADIOLOGY | Facility: CLINIC | Age: 70
End: 2020-10-12
Payer: MEDICARE

## 2020-10-12 ENCOUNTER — OFFICE VISIT (OUTPATIENT)
Dept: FAMILY MEDICINE CLINIC | Facility: CLINIC | Age: 70
End: 2020-10-12
Payer: MEDICARE

## 2020-10-12 VITALS
BODY MASS INDEX: 28.72 KG/M2 | RESPIRATION RATE: 16 BRPM | DIASTOLIC BLOOD PRESSURE: 70 MMHG | WEIGHT: 183 LBS | HEIGHT: 67 IN | TEMPERATURE: 97.6 F | SYSTOLIC BLOOD PRESSURE: 120 MMHG | HEART RATE: 82 BPM | OXYGEN SATURATION: 98 %

## 2020-10-12 DIAGNOSIS — M54.2 NECK PAIN: ICD-10-CM

## 2020-10-12 DIAGNOSIS — Z23 NEED FOR INFLUENZA VACCINATION: ICD-10-CM

## 2020-10-12 DIAGNOSIS — M54.2 NECK PAIN: Primary | ICD-10-CM

## 2020-10-12 PROCEDURE — G0008 ADMIN INFLUENZA VIRUS VAC: HCPCS | Performed by: FAMILY MEDICINE

## 2020-10-12 PROCEDURE — 72050 X-RAY EXAM NECK SPINE 4/5VWS: CPT

## 2020-10-12 PROCEDURE — 90662 IIV NO PRSV INCREASED AG IM: CPT | Performed by: FAMILY MEDICINE

## 2020-10-12 PROCEDURE — 99213 OFFICE O/P EST LOW 20 MIN: CPT | Performed by: FAMILY MEDICINE

## 2020-10-27 ENCOUNTER — TELEPHONE (OUTPATIENT)
Dept: FAMILY MEDICINE CLINIC | Facility: CLINIC | Age: 70
End: 2020-10-27

## 2020-12-11 ENCOUNTER — OFFICE VISIT (OUTPATIENT)
Dept: FAMILY MEDICINE CLINIC | Facility: CLINIC | Age: 70
End: 2020-12-11
Payer: MEDICARE

## 2020-12-11 VITALS
BODY MASS INDEX: 27.31 KG/M2 | HEIGHT: 67 IN | SYSTOLIC BLOOD PRESSURE: 110 MMHG | TEMPERATURE: 97.8 F | HEART RATE: 70 BPM | RESPIRATION RATE: 16 BRPM | DIASTOLIC BLOOD PRESSURE: 70 MMHG | OXYGEN SATURATION: 97 % | WEIGHT: 174 LBS

## 2020-12-11 DIAGNOSIS — R73.09 ELEVATED GLUCOSE: ICD-10-CM

## 2020-12-11 DIAGNOSIS — Z00.00 MEDICARE ANNUAL WELLNESS VISIT, SUBSEQUENT: Primary | ICD-10-CM

## 2020-12-11 DIAGNOSIS — E78.1 HYPERTRIGLYCERIDEMIA: ICD-10-CM

## 2020-12-11 PROCEDURE — G0439 PPPS, SUBSEQ VISIT: HCPCS | Performed by: FAMILY MEDICINE

## 2020-12-11 PROCEDURE — 99213 OFFICE O/P EST LOW 20 MIN: CPT | Performed by: FAMILY MEDICINE

## 2020-12-29 DIAGNOSIS — I10 ESSENTIAL HYPERTENSION: ICD-10-CM

## 2020-12-29 RX ORDER — LISINOPRIL AND HYDROCHLOROTHIAZIDE 20; 12.5 MG/1; MG/1
1 TABLET ORAL DAILY
Qty: 90 TABLET | Refills: 0 | Status: SHIPPED | OUTPATIENT
Start: 2020-12-29 | End: 2021-03-26

## 2021-03-25 DIAGNOSIS — I10 ESSENTIAL HYPERTENSION: ICD-10-CM

## 2021-03-26 RX ORDER — LISINOPRIL AND HYDROCHLOROTHIAZIDE 20; 12.5 MG/1; MG/1
1 TABLET ORAL DAILY
Qty: 90 TABLET | Refills: 0 | Status: SHIPPED | OUTPATIENT
Start: 2021-03-26 | End: 2021-04-21 | Stop reason: SDUPTHER

## 2021-04-21 DIAGNOSIS — I10 ESSENTIAL HYPERTENSION: ICD-10-CM

## 2021-04-21 RX ORDER — LISINOPRIL AND HYDROCHLOROTHIAZIDE 20; 12.5 MG/1; MG/1
1 TABLET ORAL DAILY
Qty: 90 TABLET | Refills: 1 | Status: SHIPPED | OUTPATIENT
Start: 2021-04-21 | End: 2021-12-20

## 2021-04-21 NOTE — TELEPHONE ENCOUNTER
Last OV 12/11/20  Next OV 6/14/21    I spoke to pharmacy and was told they never received last prescription sent 3/26/21  Requesting new prescription

## 2021-07-14 ENCOUNTER — OFFICE VISIT (OUTPATIENT)
Dept: FAMILY MEDICINE CLINIC | Facility: CLINIC | Age: 71
End: 2021-07-14
Payer: MEDICARE

## 2021-07-14 VITALS
SYSTOLIC BLOOD PRESSURE: 134 MMHG | HEIGHT: 67 IN | HEART RATE: 74 BPM | TEMPERATURE: 97.6 F | WEIGHT: 170.2 LBS | BODY MASS INDEX: 26.71 KG/M2 | OXYGEN SATURATION: 97 % | DIASTOLIC BLOOD PRESSURE: 82 MMHG

## 2021-07-14 DIAGNOSIS — Z11.59 ENCOUNTER FOR HEPATITIS C SCREENING TEST FOR LOW RISK PATIENT: ICD-10-CM

## 2021-07-14 DIAGNOSIS — I10 ESSENTIAL HYPERTENSION: ICD-10-CM

## 2021-07-14 DIAGNOSIS — R73.09 ELEVATED GLUCOSE: Primary | ICD-10-CM

## 2021-07-14 DIAGNOSIS — E78.1 HYPERTRIGLYCERIDEMIA: ICD-10-CM

## 2021-07-14 PROCEDURE — 99214 OFFICE O/P EST MOD 30 MIN: CPT | Performed by: FAMILY MEDICINE

## 2021-07-14 NOTE — PROGRESS NOTES
[facility]      NAME: Kathy Trotter  AGE: 72 y o  SEX: female  : 1950   MRN: [id number]    DATE: 2021  TIME: 2:05 PM    Assessment and Plan     Problem List Items Addressed This Visit     Hypertriglyceridemia     Well controlled with normal triglycerides and LDL cholesterol less than 100         Relevant Orders    Comprehensive metabolic panel    TSH, 3rd generation    Hypertension     Stable on lisinopril hydrochlorothiazide 20/12 5         Elevated glucose - Primary     Hemoglobin A1c 5 9  Continue with diet and exercise         Relevant Orders    Hemoglobin A1C      Other Visit Diagnoses     Encounter for hepatitis C screening test for low risk patient        Relevant Orders    Hepatitis C antibody              Return to office in:  P r n  6 months, annual wellness visit    Chief Complaint     Chief Complaint   Patient presents with    Follow-up       History of Present Illness     Patient was seen for routine follow-up of chronic medical problems  She is being followed for hypertension, hypertriglyceridemia elevated fasting glucose  She takes lisinopril hydrochlorothiazide for her blood pressure and manages her lipids and blood sugar with diet and exercise  She has lost about 25 lb over the last year and feels well  She exercises almost daily and watches her diet  The following portions of the patient's history were reviewed and updated as appropriate: allergies, current medications, past family history, past medical history, past social history, past surgical history and problem list     Review of Systems   Review of Systems   Constitutional: Negative  Respiratory: Negative  Cardiovascular: Negative  Gastrointestinal: Negative  Genitourinary: Negative  Musculoskeletal: Negative  Psychiatric/Behavioral: Negative          Active Problem List     Patient Active Problem List   Diagnosis    Hypertension    Hypertriglyceridemia    Vitamin D deficiency  PMB (postmenopausal bleeding)    Obesity (BMI 30 0-34  9)    Elevated glucose       Objective   /82 (BP Location: Right arm, Patient Position: Sitting, Cuff Size: Standard)   Pulse 74   Temp 97 6 °F (36 4 °C) (Temporal)   Ht 5' 6 93" (1 7 m)   Wt 77 2 kg (170 lb 3 2 oz)   SpO2 97%   BMI 26 71 kg/m²     Physical Exam  Vitals and nursing note reviewed  Constitutional:       General: She is not in acute distress  Appearance: She is well-developed  She is not diaphoretic  HENT:      Head: Normocephalic and atraumatic  Eyes:      General:         Right eye: No discharge  Conjunctiva/sclera: Conjunctivae normal       Pupils: Pupils are equal, round, and reactive to light  Neck:      Thyroid: No thyromegaly  Cardiovascular:      Rate and Rhythm: Normal rate and regular rhythm  Pulmonary:      Effort: Pulmonary effort is normal  No respiratory distress  Breath sounds: Normal breath sounds  Musculoskeletal:      Cervical back: Normal range of motion  Lymphadenopathy:      Cervical: No cervical adenopathy  Skin:     General: Skin is warm and dry  Neurological:      Mental Status: She is alert and oriented to person, place, and time  Psychiatric:         Behavior: Behavior normal          Thought Content:  Thought content normal          Judgment: Judgment normal            Current Medications     Current Outpatient Medications:     Cholecalciferol (VITAMIN D3) 2000 units capsule, Take 1 tablet by mouth daily, Disp: , Rfl:     clobetasol (TEMOVATE) 0 05 % ointment, Apply BID x 2 weeks then nightly x 2 weeks for flare then 1-2 times a week for maintenance, Disp: , Rfl:     estradiol (ESTRACE) 0 1 mg/g vaginal cream, Insert 2 g into the vagina daily, Disp: , Rfl:     Estriol ORLIN, , Disp: , Rfl:     lisinopril-hydrochlorothiazide (PRINZIDE,ZESTORETIC) 20-12 5 MG per tablet, Take 1 tablet by mouth daily, Disp: 90 tablet, Rfl: 1    NON FORMULARY, Take 150 mg by mouth daily Progesterone, Disp: , Rfl:     NON FORMULARY, 1 5 mg/mL Biestrogen 80/20 apply a small amount to fatty skin, Disp: , Rfl:     Omega-3 Fatty Acids (FISH OIL) 1,000 mg, Take 1,000 mg by mouth daily  , Disp: , Rfl:     Health Maintenance     Health Maintenance   Topic Date Due    Hepatitis C Screening  Never done    DTaP,Tdap,and Td Vaccines (2 - Td or Tdap) 01/01/2020    PT PLAN OF CARE  10/11/2020    Breast Cancer Screening: Mammogram  05/27/2021    Influenza Vaccine (1) 09/01/2021    Depression Screening PHQ  12/11/2021    BMI: Followup Plan  12/11/2021    Fall Risk  12/11/2021    Medicare Annual Wellness Visit (AWV)  12/11/2021    BMI: Adult  07/14/2022    Colorectal Cancer Screening  03/24/2026    Pneumococcal Vaccine: 65+ Years  Completed    COVID-19 Vaccine  Completed    HIB Vaccine  Aged Out    Hepatitis B Vaccine  Aged Out    IPV Vaccine  Aged Out    Hepatitis A Vaccine  Aged Out    Meningococcal ACWY Vaccine  Aged Out    HPV Vaccine  Aged [employer] History   Administered Date(s) Administered    INFLUENZA 10/19/2015, 10/23/2017    Influenza Quadrivalent, 6-35 Months IM 10/19/2015    Influenza Split High Dose Preservative Free IM 10/23/2017    Influenza, high dose seasonal 0 7 mL 10/23/2018, 10/23/2019, 10/12/2020    Pneumococcal Conjugate 13-Valent 10/23/2017    Pneumococcal Polysaccharide PPV23 10/23/2018    SARS-CoV-2 / COVID-19 mRNA IM (Moderna) 01/25/2021, 02/22/2021    Tdap 01/01/2010    Zoster 10/19/2015       Hellen Wilson, DO  [facility]

## 2021-12-06 ENCOUNTER — EVALUATION (OUTPATIENT)
Dept: PHYSICAL THERAPY | Facility: MEDICAL CENTER | Age: 71
End: 2021-12-06
Payer: MEDICARE

## 2021-12-06 DIAGNOSIS — M54.2 CHRONIC NECK PAIN: Primary | ICD-10-CM

## 2021-12-06 DIAGNOSIS — G89.29 CHRONIC NECK PAIN: Primary | ICD-10-CM

## 2021-12-06 PROCEDURE — 97162 PT EVAL MOD COMPLEX 30 MIN: CPT | Performed by: PHYSICAL THERAPIST

## 2021-12-10 ENCOUNTER — OFFICE VISIT (OUTPATIENT)
Dept: PHYSICAL THERAPY | Facility: MEDICAL CENTER | Age: 71
End: 2021-12-10
Payer: MEDICARE

## 2021-12-10 DIAGNOSIS — M54.2 CHRONIC NECK PAIN: Primary | ICD-10-CM

## 2021-12-10 DIAGNOSIS — G89.29 CHRONIC NECK PAIN: Primary | ICD-10-CM

## 2021-12-10 PROCEDURE — 97110 THERAPEUTIC EXERCISES: CPT | Performed by: PHYSICAL THERAPIST

## 2021-12-10 PROCEDURE — 97140 MANUAL THERAPY 1/> REGIONS: CPT | Performed by: PHYSICAL THERAPIST

## 2021-12-10 PROCEDURE — 97010 HOT OR COLD PACKS THERAPY: CPT | Performed by: PHYSICAL THERAPIST

## 2021-12-15 ENCOUNTER — APPOINTMENT (OUTPATIENT)
Dept: PHYSICAL THERAPY | Facility: MEDICAL CENTER | Age: 71
End: 2021-12-15
Payer: MEDICARE

## 2021-12-17 ENCOUNTER — OFFICE VISIT (OUTPATIENT)
Dept: PHYSICAL THERAPY | Facility: MEDICAL CENTER | Age: 71
End: 2021-12-17
Payer: MEDICARE

## 2021-12-17 DIAGNOSIS — G89.29 CHRONIC NECK PAIN: Primary | ICD-10-CM

## 2021-12-17 DIAGNOSIS — M54.2 CHRONIC NECK PAIN: Primary | ICD-10-CM

## 2021-12-17 PROCEDURE — 97010 HOT OR COLD PACKS THERAPY: CPT | Performed by: PHYSICAL THERAPIST

## 2021-12-17 PROCEDURE — 97140 MANUAL THERAPY 1/> REGIONS: CPT | Performed by: PHYSICAL THERAPIST

## 2021-12-17 PROCEDURE — 97110 THERAPEUTIC EXERCISES: CPT | Performed by: PHYSICAL THERAPIST

## 2021-12-18 DIAGNOSIS — I10 ESSENTIAL HYPERTENSION: ICD-10-CM

## 2021-12-20 ENCOUNTER — OFFICE VISIT (OUTPATIENT)
Dept: PHYSICAL THERAPY | Facility: MEDICAL CENTER | Age: 71
End: 2021-12-20
Payer: MEDICARE

## 2021-12-20 DIAGNOSIS — M54.2 CHRONIC NECK PAIN: Primary | ICD-10-CM

## 2021-12-20 DIAGNOSIS — G89.29 CHRONIC NECK PAIN: Primary | ICD-10-CM

## 2021-12-20 PROCEDURE — 97140 MANUAL THERAPY 1/> REGIONS: CPT | Performed by: PHYSICAL THERAPIST

## 2021-12-20 PROCEDURE — 97110 THERAPEUTIC EXERCISES: CPT | Performed by: PHYSICAL THERAPIST

## 2021-12-20 PROCEDURE — 97010 HOT OR COLD PACKS THERAPY: CPT | Performed by: PHYSICAL THERAPIST

## 2021-12-20 RX ORDER — LISINOPRIL AND HYDROCHLOROTHIAZIDE 20; 12.5 MG/1; MG/1
1 TABLET ORAL DAILY
Qty: 90 TABLET | Refills: 1 | Status: SHIPPED | OUTPATIENT
Start: 2021-12-20 | End: 2022-03-28

## 2021-12-22 ENCOUNTER — OFFICE VISIT (OUTPATIENT)
Dept: PHYSICAL THERAPY | Facility: MEDICAL CENTER | Age: 71
End: 2021-12-22
Payer: MEDICARE

## 2021-12-22 DIAGNOSIS — G89.29 CHRONIC NECK PAIN: Primary | ICD-10-CM

## 2021-12-22 DIAGNOSIS — M54.2 CHRONIC NECK PAIN: Primary | ICD-10-CM

## 2021-12-22 PROCEDURE — 97110 THERAPEUTIC EXERCISES: CPT | Performed by: PHYSICAL THERAPIST

## 2021-12-22 PROCEDURE — 97010 HOT OR COLD PACKS THERAPY: CPT | Performed by: PHYSICAL THERAPIST

## 2021-12-22 PROCEDURE — 97140 MANUAL THERAPY 1/> REGIONS: CPT | Performed by: PHYSICAL THERAPIST

## 2021-12-28 ENCOUNTER — OFFICE VISIT (OUTPATIENT)
Dept: PHYSICAL THERAPY | Facility: MEDICAL CENTER | Age: 71
End: 2021-12-28
Payer: MEDICARE

## 2021-12-28 DIAGNOSIS — M54.2 CHRONIC NECK PAIN: Primary | ICD-10-CM

## 2021-12-28 DIAGNOSIS — G89.29 CHRONIC NECK PAIN: Primary | ICD-10-CM

## 2021-12-28 PROCEDURE — 97110 THERAPEUTIC EXERCISES: CPT | Performed by: PHYSICAL THERAPIST

## 2021-12-28 PROCEDURE — 97010 HOT OR COLD PACKS THERAPY: CPT | Performed by: PHYSICAL THERAPIST

## 2021-12-28 PROCEDURE — 97140 MANUAL THERAPY 1/> REGIONS: CPT | Performed by: PHYSICAL THERAPIST

## 2021-12-30 ENCOUNTER — OFFICE VISIT (OUTPATIENT)
Dept: PHYSICAL THERAPY | Facility: MEDICAL CENTER | Age: 71
End: 2021-12-30
Payer: MEDICARE

## 2021-12-30 DIAGNOSIS — M54.2 CHRONIC NECK PAIN: Primary | ICD-10-CM

## 2021-12-30 DIAGNOSIS — G89.29 CHRONIC NECK PAIN: Primary | ICD-10-CM

## 2021-12-30 PROCEDURE — 97010 HOT OR COLD PACKS THERAPY: CPT | Performed by: PHYSICAL THERAPIST

## 2021-12-30 PROCEDURE — 97140 MANUAL THERAPY 1/> REGIONS: CPT | Performed by: PHYSICAL THERAPIST

## 2021-12-30 PROCEDURE — 97110 THERAPEUTIC EXERCISES: CPT | Performed by: PHYSICAL THERAPIST

## 2022-01-03 ENCOUNTER — OFFICE VISIT (OUTPATIENT)
Dept: PHYSICAL THERAPY | Facility: MEDICAL CENTER | Age: 72
End: 2022-01-03
Payer: MEDICARE

## 2022-01-03 DIAGNOSIS — M54.2 CHRONIC NECK PAIN: Primary | ICD-10-CM

## 2022-01-03 DIAGNOSIS — G89.29 CHRONIC NECK PAIN: Primary | ICD-10-CM

## 2022-01-03 PROCEDURE — 97010 HOT OR COLD PACKS THERAPY: CPT | Performed by: PHYSICAL THERAPIST

## 2022-01-03 PROCEDURE — 97140 MANUAL THERAPY 1/> REGIONS: CPT | Performed by: PHYSICAL THERAPIST

## 2022-01-03 PROCEDURE — 97110 THERAPEUTIC EXERCISES: CPT | Performed by: PHYSICAL THERAPIST

## 2022-01-03 NOTE — PROGRESS NOTES
Daily Note     Today's date: 1/3/2022  Patient name: Raman Roblero  : 1950  MRN: [id number]  Referring provider: Brandi Lares DO  Dx:   Encounter Diagnosis     ICD-10-CM    1  Chronic neck pain  M54 2     G89 29                   Subjective: patient states that she is feeling a better and is seeing some real progress  Objective: See treatment diary below      Assessment: Tolerated treatment well  Patient demonstrated fatigue post treatment, exhibited good technique with therapeutic exercises and would benefit from continued PT  Focus today on manual therapy and postural exercises  Patient was feeling better following session  Cervical movement is improved  Plan: Continue per plan of care  Daily Treatment Diary     Manual              Cervical STM 10min            C3-4 UPA left Gr   Iv- 10sec x5                                                       Exercise Diary              UBE             pec stretch [id number]            No monnies 15x2 blue band            Shoulder extension 15x2 blue band            Scapular retractions 15x2 black band            Horizontal abduction 15x2 blue band            Chin tucks 15x2            Thoracic extension over foam roller 5secx3            Foam roller on wall 5 min                                                                                                                                                               Modalities              Heat supine 10min

## 2022-01-06 ENCOUNTER — OFFICE VISIT (OUTPATIENT)
Dept: PHYSICAL THERAPY | Facility: MEDICAL CENTER | Age: 72
End: 2022-01-06
Payer: MEDICARE

## 2022-01-06 DIAGNOSIS — M54.2 CHRONIC NECK PAIN: Primary | ICD-10-CM

## 2022-01-06 DIAGNOSIS — G89.29 CHRONIC NECK PAIN: Primary | ICD-10-CM

## 2022-01-06 PROCEDURE — 97140 MANUAL THERAPY 1/> REGIONS: CPT | Performed by: PHYSICAL THERAPIST

## 2022-01-06 PROCEDURE — 97010 HOT OR COLD PACKS THERAPY: CPT | Performed by: PHYSICAL THERAPIST

## 2022-01-06 PROCEDURE — 97110 THERAPEUTIC EXERCISES: CPT | Performed by: PHYSICAL THERAPIST

## 2022-01-06 NOTE — PROGRESS NOTES
Daily Note     Today's date: 2022  Patient name: Opal Forbes  : 1950  MRN: [id number]  Referring provider: Sukumar Russell DO  Dx:   Encounter Diagnosis     ICD-10-CM    1  Chronic neck pain  M54 2     G89 29                   Subjective: patient states that she is feeling a better and is seeing some real progress  Neck and upper back seem to be improving  Objective: See treatment diary below      Assessment: Tolerated treatment well  Patient demonstrated fatigue post treatment, exhibited good technique with therapeutic exercises and would benefit from continued PT  Focus today on manual therapy and postural exercises  Patient was feeling better following session  Cervical movement is improved  Plan: Continue per plan of care  Daily Treatment Diary     Manual              Cervical STM 10min            C3-4 UPA left Gr   Iv- 10sec x5                                                       Exercise Diary              UBE             pec stretch [id number]            No monnies 15x2 blue band            Shoulder extension 15x2 blue band            Scapular retractions 15x2 black band            Horizontal abduction 15x2 blue band            Chin tucks 15x2            Thoracic extension over foam roller 5secx3            Foam roller on wall 5 min                                                                                                                                                               Modalities              Heat supine 10min

## 2022-01-10 ENCOUNTER — APPOINTMENT (OUTPATIENT)
Dept: PHYSICAL THERAPY | Facility: MEDICAL CENTER | Age: 72
End: 2022-01-10
Payer: MEDICARE

## 2022-01-14 ENCOUNTER — OFFICE VISIT (OUTPATIENT)
Dept: PHYSICAL THERAPY | Facility: MEDICAL CENTER | Age: 72
End: 2022-01-14
Payer: MEDICARE

## 2022-01-14 DIAGNOSIS — G89.29 CHRONIC NECK PAIN: Primary | ICD-10-CM

## 2022-01-14 DIAGNOSIS — M54.2 CHRONIC NECK PAIN: Primary | ICD-10-CM

## 2022-01-14 PROCEDURE — 97010 HOT OR COLD PACKS THERAPY: CPT | Performed by: PHYSICAL THERAPIST

## 2022-01-14 PROCEDURE — 97140 MANUAL THERAPY 1/> REGIONS: CPT | Performed by: PHYSICAL THERAPIST

## 2022-01-14 PROCEDURE — 97110 THERAPEUTIC EXERCISES: CPT | Performed by: PHYSICAL THERAPIST

## 2022-01-14 NOTE — PROGRESS NOTES
Daily Note     Today's date: 2022  Patient name: Jordon Alexander  : 1950  MRN: [id number]  Referring provider: Rafael Gonzalez DO  Dx:   Encounter Diagnosis     ICD-10-CM    1  Chronic neck pain  M54 2     G89 29                   Subjective: patient states that she is feeling a better and is seeing some real progress  Had some increased pain one day this week but knew how to handle it and got back to her better baseline quickly  Objective: See treatment diary below      Assessment: Tolerated treatment well  Patient demonstrated fatigue post treatment, exhibited good technique with therapeutic exercises and would benefit from continued PT  Focus today on manual therapy and postural exercises  Increased tension and pain in her musculature most likely from her painful day this week  Plan: Continue per plan of care  Daily Treatment Diary     Manual              Cervical STM 10min            C3-4 UPA left Gr   Iv- 10sec x5                                                       Exercise Diary              UBE             pec stretch [id number]            No monnies 15x2 blue band            Shoulder extension 15x2 blue band            Scapular retractions 15x2 black band            Horizontal abduction 15x2 blue band            Chin tucks 15x2            Thoracic extension over foam roller 5secx3            Foam roller on wall 5 min                                                                                                                                                               Modalities              Heat supine 10min

## 2022-01-18 ENCOUNTER — OFFICE VISIT (OUTPATIENT)
Dept: PHYSICAL THERAPY | Facility: MEDICAL CENTER | Age: 72
End: 2022-01-18
Payer: MEDICARE

## 2022-01-18 DIAGNOSIS — G89.29 CHRONIC NECK PAIN: Primary | ICD-10-CM

## 2022-01-18 DIAGNOSIS — M54.2 CHRONIC NECK PAIN: Primary | ICD-10-CM

## 2022-01-18 PROCEDURE — 97140 MANUAL THERAPY 1/> REGIONS: CPT | Performed by: PHYSICAL THERAPIST

## 2022-01-18 PROCEDURE — 97010 HOT OR COLD PACKS THERAPY: CPT | Performed by: PHYSICAL THERAPIST

## 2022-01-18 NOTE — PROGRESS NOTES
Daily Note     Today's date: 2022  Patient name: Jose Harry  : 1950  MRN: [id number]  Referring provider: Maryjo Farooq DO  Dx:   Encounter Diagnosis     ICD-10-CM    1  Chronic neck pain  M54 2     G89 29                   Subjective: patient states that she is feeling more pain in her neck up higher than it has been  The issues that we have been working on are much better but today the upper neck is very stiff  Objective: See treatment diary below      Assessment: Tolerated treatment well  Patient demonstrated fatigue post treatment, exhibited good technique with therapeutic exercises and would benefit from continued PT  Focus today on manual therapy and postural exercises  Right C3-5 stiffness addressed with manual therapy which improved motion and decreased pain  Plan: Continue per plan of care  Daily Treatment Diary     Manual              Cervical STM 10min            C3-4 UPA left Gr  Iv- 10sec x5            C3-5 UPA right Gr   Iv 10sec x5                                          Exercise Diary              UBE             pec stretch [id number]            No monnies 15x2 blue band            Shoulder extension 15x2 blue band            Scapular retractions 15x2 black band            Horizontal abduction 15x2 blue band            Chin tucks 15x2            Thoracic extension over foam roller 5secx3            Foam roller on wall 5 min                                                                                                                                                               Modalities              Heat supine 10min

## 2022-01-21 ENCOUNTER — OFFICE VISIT (OUTPATIENT)
Dept: PHYSICAL THERAPY | Facility: MEDICAL CENTER | Age: 72
End: 2022-01-21
Payer: MEDICARE

## 2022-01-21 DIAGNOSIS — M54.2 CHRONIC NECK PAIN: Primary | ICD-10-CM

## 2022-01-21 DIAGNOSIS — G89.29 CHRONIC NECK PAIN: Primary | ICD-10-CM

## 2022-01-21 PROCEDURE — 97010 HOT OR COLD PACKS THERAPY: CPT | Performed by: PHYSICAL THERAPIST

## 2022-01-21 PROCEDURE — 97110 THERAPEUTIC EXERCISES: CPT | Performed by: PHYSICAL THERAPIST

## 2022-01-21 PROCEDURE — 97140 MANUAL THERAPY 1/> REGIONS: CPT | Performed by: PHYSICAL THERAPIST

## 2022-01-21 NOTE — PROGRESS NOTES
Daily Note     Today's date: 2022  Patient name: Porfirio Mane  : 1950  MRN: [id number]  Referring provider: Cee Soliman DO  Dx:   Encounter Diagnosis     ICD-10-CM    1  Chronic neck pain  M54 2     G89 29                   Subjective: patient states that she is feeling  Better than last session but continues to have some discomfort in her upper neck  The upper back is much better and she is having no issues at this point  My have more discomfort from snow shoveling  Objective: See treatment diary below      Assessment: Tolerated treatment well  Patient demonstrated fatigue post treatment, exhibited good technique with therapeutic exercises and would benefit from continued PT  Focus today on manual therapy and postural exercises  Right C3-5 stiffness addressed with manual therapy which improved motion and decreased pain as it did last visit as well  Plan: Continue per plan of care  Daily Treatment Diary     Manual              Cervical STM 10min            C3-4 UPA left Gr  Iv- 10sec x5            C3-5 UPA right Gr   Iv 10sec x5                                          Exercise Diary              UBE             pec stretch [id number]            No monnies 15x2 blue band            Shoulder extension 15x2 blue band            Scapular retractions 15x2 black band            Horizontal abduction 15x2 blue band            Chin tucks 15x2            Thoracic extension over foam roller 5secx3            Foam roller on wall 5 min                                                                                                                                                               Modalities              Heat supine 10min

## 2022-01-24 ENCOUNTER — OFFICE VISIT (OUTPATIENT)
Dept: PHYSICAL THERAPY | Facility: MEDICAL CENTER | Age: 72
End: 2022-01-24
Payer: MEDICARE

## 2022-01-24 DIAGNOSIS — M54.2 CHRONIC NECK PAIN: Primary | ICD-10-CM

## 2022-01-24 DIAGNOSIS — G89.29 CHRONIC NECK PAIN: Primary | ICD-10-CM

## 2022-01-24 PROCEDURE — 97010 HOT OR COLD PACKS THERAPY: CPT | Performed by: PHYSICAL THERAPIST

## 2022-01-24 PROCEDURE — 97140 MANUAL THERAPY 1/> REGIONS: CPT | Performed by: PHYSICAL THERAPIST

## 2022-01-24 PROCEDURE — 97110 THERAPEUTIC EXERCISES: CPT | Performed by: PHYSICAL THERAPIST

## 2022-01-24 NOTE — PROGRESS NOTES
Daily Note     Today's date: 2022  Patient name: Raman Roblero  : 1950  MRN: [id number]  Referring provider: Brandi Lares DO  Dx:   Encounter Diagnosis     ICD-10-CM    1  Chronic neck pain  M54 2     G89 29                   Subjective: patient states that she is feeling  Better than last session but continues to have some discomfort in her upper neck  Objective: See treatment diary below      Assessment: Tolerated treatment well  Patient demonstrated fatigue post treatment, exhibited good technique with therapeutic exercises and would benefit from continued PT  Focus today on manual therapy and postural exercises  Right C3-5 stiffness addressed with manual therapy which improved motion and decreased pain as it did last visit as well  Plan: Continue per plan of care  Daily Treatment Diary     Manual              Cervical STM 10min            C3-4 UPA left Gr  Iv- 10sec x5            C3-5 UPA right Gr   Iv 10sec x5                                          Exercise Diary              UBE             pec stretch [id number]            No monnies 15x2 blue band            Shoulder extension 15x2 blue band            Scapular retractions 15x2 black band            Horizontal abduction 15x2 blue band            Chin tucks 15x2            Thoracic extension over foam roller 5secx3            Foam roller on wall 5 min                                                                                                                                                               Modalities              Heat supine 10min

## 2022-01-28 ENCOUNTER — OFFICE VISIT (OUTPATIENT)
Dept: PHYSICAL THERAPY | Facility: MEDICAL CENTER | Age: 72
End: 2022-01-28
Payer: MEDICARE

## 2022-01-28 DIAGNOSIS — G89.29 CHRONIC NECK PAIN: Primary | ICD-10-CM

## 2022-01-28 DIAGNOSIS — M54.2 CHRONIC NECK PAIN: Primary | ICD-10-CM

## 2022-01-28 PROCEDURE — 97010 HOT OR COLD PACKS THERAPY: CPT | Performed by: PHYSICAL THERAPIST

## 2022-01-28 PROCEDURE — 97110 THERAPEUTIC EXERCISES: CPT | Performed by: PHYSICAL THERAPIST

## 2022-01-28 PROCEDURE — 97140 MANUAL THERAPY 1/> REGIONS: CPT | Performed by: PHYSICAL THERAPIST

## 2022-01-28 NOTE — PROGRESS NOTES
Daily Note     Today's date: 2022  Patient name: Dione Johansen  : 1950  MRN: [id number]  Referring provider: Kathryn Purdy DO  Dx:   Encounter Diagnosis     ICD-10-CM    1  Chronic neck pain  M54 2     G89 29                   Subjective: patient states that she is feeling better and she is only having  some discomfort in her right upper trap today  Objective: See treatment diary below      Assessment: Tolerated treatment well  Patient demonstrated fatigue post treatment, exhibited good technique with therapeutic exercises and would benefit from continued PT  Focus today on manual therapy and postural exercises  Plan: Continue per plan of care  Daily Treatment Diary     Manual              Cervical STM 10min            C3-4 UPA left Gr  Iv- 10sec x5            C3-5 UPA right Gr   Iv 10sec x5                                          Exercise Diary              UBE             pec stretch [id number]            No monnies 15x2 blue band            Shoulder extension 15x2 blue band            Scapular retractions 15x2 black band            Horizontal abduction 15x2 blue band            Chin tucks 15x2            Thoracic extension over foam roller 5secx3            Foam roller on wall 5 min                                                                                                                                                               Modalities              Heat supine 10min

## 2022-02-02 ENCOUNTER — OFFICE VISIT (OUTPATIENT)
Dept: PHYSICAL THERAPY | Facility: MEDICAL CENTER | Age: 72
End: 2022-02-02
Payer: MEDICARE

## 2022-02-02 DIAGNOSIS — M54.2 CHRONIC NECK PAIN: Primary | ICD-10-CM

## 2022-02-02 DIAGNOSIS — G89.29 CHRONIC NECK PAIN: Primary | ICD-10-CM

## 2022-02-02 PROCEDURE — 97110 THERAPEUTIC EXERCISES: CPT | Performed by: PHYSICAL THERAPIST

## 2022-02-02 PROCEDURE — 97140 MANUAL THERAPY 1/> REGIONS: CPT | Performed by: PHYSICAL THERAPIST

## 2022-02-02 PROCEDURE — 97010 HOT OR COLD PACKS THERAPY: CPT | Performed by: PHYSICAL THERAPIST

## 2022-02-02 NOTE — PROGRESS NOTES
Daily Note     Today's date: 2022  Patient name: Amarilis Merlos  : 1950  MRN: [id number]  Referring provider: Roger Beckwith DO  Dx:   Encounter Diagnosis     ICD-10-CM    1  Chronic neck pain  M54 2     G89 29                   Subjective: patient states that she is feeling better  She was able to play 2 hours of pickle ball yesterday without stopping  Patient notes that she is taking advil a bit more consistently 5-6 days  Objective: See treatment diary below      Assessment: Tolerated treatment well  Patient demonstrated fatigue post treatment, exhibited good technique with therapeutic exercises and would benefit from continued PT  Focus today on manual therapy and postural exercises  Increased NSAIDS seems to be effective however patient was educated on appropriateness and warned on side effects  It was suggested that she discuss with her PCP, Dr Austin Nixon  Plan: Continue per plan of care  Daily Treatment Diary     Manual              Cervical STM 10min            C3-4 UPA left Gr  Iv- 10sec x5            C3-5 UPA right Gr   Iv 10sec x5                                          Exercise Diary              UBE             pec stretch [id number]            No monnies 15x2 blue band            Shoulder extension 15x2 blue band            Scapular retractions 15x2 black band            Horizontal abduction 15x2 blue band            Chin tucks 15x2            Thoracic extension over foam roller 5secx3            Foam roller on wall 5 min                                                                                                                                                               Modalities              Heat supine 10min

## 2022-02-04 ENCOUNTER — APPOINTMENT (OUTPATIENT)
Dept: PHYSICAL THERAPY | Facility: MEDICAL CENTER | Age: 72
End: 2022-02-04
Payer: MEDICARE

## 2022-02-08 ENCOUNTER — OFFICE VISIT (OUTPATIENT)
Dept: PHYSICAL THERAPY | Facility: MEDICAL CENTER | Age: 72
End: 2022-02-08
Payer: MEDICARE

## 2022-02-08 DIAGNOSIS — M54.2 CHRONIC NECK PAIN: Primary | ICD-10-CM

## 2022-02-08 DIAGNOSIS — G89.29 CHRONIC NECK PAIN: Primary | ICD-10-CM

## 2022-02-08 PROCEDURE — 97140 MANUAL THERAPY 1/> REGIONS: CPT | Performed by: PHYSICAL THERAPIST

## 2022-02-08 PROCEDURE — 97010 HOT OR COLD PACKS THERAPY: CPT | Performed by: PHYSICAL THERAPIST

## 2022-02-08 PROCEDURE — 97110 THERAPEUTIC EXERCISES: CPT | Performed by: PHYSICAL THERAPIST

## 2022-02-08 NOTE — PROGRESS NOTES
Daily Note     Today's date: 2022  Patient name: Jose Harry  : 1950  MRN: [id number]  Referring provider: Maryjo Farooq DO  Dx:   Encounter Diagnosis     ICD-10-CM    1  Chronic neck pain  M54 2     G89 29                   Subjective: patient states that she is more sore today  Notes that she had a busy weekend  Objective: See treatment diary below      Assessment: Tolerated treatment well  Patient demonstrated fatigue post treatment, exhibited good technique with therapeutic exercises and would benefit from continued PT  Focus today on manual therapy and postural exercises  Increased NSAIDS seems to be effective however patient was educated on appropriateness and warned on side effects  It was suggested that she discuss with her PCP, Dr Panchito Stevens  Plan: Continue per plan of care  Daily Treatment Diary     Manual              Cervical STM 10min            C3-4 UPA left Gr  Iv- 10sec x5            C3-5 UPA right Gr   Iv 10sec x5                                          Exercise Diary              UBE             pec stretch [id number]            No monnies 15x2 blue band            Shoulder extension 15x2 blue band            Scapular retractions 15x2 black band            Horizontal abduction 15x2 blue band            Chin tucks 15x2            Thoracic extension over foam roller 5secx3            Foam roller on wall 5 min                                                                                                                                                               Modalities              Heat supine 10min

## 2022-02-10 ENCOUNTER — APPOINTMENT (OUTPATIENT)
Dept: PHYSICAL THERAPY | Facility: MEDICAL CENTER | Age: 72
End: 2022-02-10
Payer: MEDICARE

## 2022-02-15 ENCOUNTER — OFFICE VISIT (OUTPATIENT)
Dept: FAMILY MEDICINE CLINIC | Facility: CLINIC | Age: 72
End: 2022-02-15
Payer: MEDICARE

## 2022-02-15 VITALS
TEMPERATURE: 97.6 F | SYSTOLIC BLOOD PRESSURE: 132 MMHG | HEIGHT: 67 IN | HEART RATE: 67 BPM | BODY MASS INDEX: 28.88 KG/M2 | WEIGHT: 184 LBS | OXYGEN SATURATION: 97 % | DIASTOLIC BLOOD PRESSURE: 74 MMHG

## 2022-02-15 DIAGNOSIS — E78.1 HYPERTRIGLYCERIDEMIA: ICD-10-CM

## 2022-02-15 DIAGNOSIS — R73.09 ELEVATED GLUCOSE: ICD-10-CM

## 2022-02-15 DIAGNOSIS — M54.2 NECK PAIN: ICD-10-CM

## 2022-02-15 DIAGNOSIS — I10 PRIMARY HYPERTENSION: ICD-10-CM

## 2022-02-15 DIAGNOSIS — Z00.00 MEDICARE ANNUAL WELLNESS VISIT, SUBSEQUENT: Primary | ICD-10-CM

## 2022-02-15 PROCEDURE — 99214 OFFICE O/P EST MOD 30 MIN: CPT | Performed by: FAMILY MEDICINE

## 2022-02-15 PROCEDURE — G0438 PPPS, INITIAL VISIT: HCPCS | Performed by: FAMILY MEDICINE

## 2022-02-15 PROCEDURE — 1123F ACP DISCUSS/DSCN MKR DOCD: CPT | Performed by: FAMILY MEDICINE

## 2022-02-15 RX ORDER — MELOXICAM 15 MG/1
15 TABLET ORAL DAILY
Qty: 30 TABLET | Refills: 2 | Status: SHIPPED | OUTPATIENT
Start: 2022-02-15 | End: 2022-05-09

## 2022-02-15 NOTE — ASSESSMENT & PLAN NOTE
Lipids well controlled with LDL cholesterol less than 100 and HDL greater than 50  Most recent triglycerides 149 last Theodora  Recheck lipid panel with next labs in July

## 2022-02-15 NOTE — PROGRESS NOTES
[facility]      NAME: Bethnay Melo  AGE: 80 y o  SEX: female  : 1950   MRN: [id number]    DATE: 2/15/2022  TIME: 11:35 AM    Assessment and Plan     Problem List Items Addressed This Visit     Hypertriglyceridemia     Lipids well controlled with LDL cholesterol less than 100 and HDL greater than 50  Most recent triglycerides 149 last   Recheck lipid panel with next labs in July  Relevant Orders    Comprehensive metabolic panel    Lipid Panel with Direct LDL reflex    TSH, 3rd generation    Hypertension     Blood pressure well controlled on lisinopril hydrochlorothiazide / 5 daily  Elevated glucose     Hemoglobin A1c slightly improved at 5 8  Continue diet and exercise  Relevant Orders    Hemoglobin A1C      Other Visit Diagnoses     Medicare annual wellness visit, subsequent    -  Primary    Questionnaire reviewed  Immunization health screening history updated  Advance directive in place  Neck pain        Relevant Medications    meloxicam (Mobic) 15 mg tablet              Return to office in:  P r n /6 months    Chief Complaint     Chief Complaint   Patient presents with    Medicare Wellness Visit    Follow-up       History of Present Illness     Patient was seen for routine follow-up of chronic medical problems  She is being followed for hyperlipidemia, hypertension, elevated glucose  She has also been going to physical therapy recently for chronic neck pain  She has made some improvement but her PT recommended considering a prescription for an anti-inflammatory  The following portions of the patient's history were reviewed and updated as appropriate: allergies, current medications, past family history, past medical history, past social history, past surgical history and problem list     Review of Systems   Review of Systems   Constitutional: Negative  Respiratory: Negative  Cardiovascular: Negative      Gastrointestinal: Negative  Genitourinary: Negative  Musculoskeletal: Positive for neck pain  Psychiatric/Behavioral: Negative  Active Problem List     Patient Active Problem List   Diagnosis    Hypertension    Hypertriglyceridemia    Vitamin D deficiency    PMB (postmenopausal bleeding)    Obesity (BMI 30 0-34  9)    Elevated glucose       Objective   /74 (BP Location: Left arm, Patient Position: Sitting, Cuff Size: Standard)   Pulse 67   Temp 97 6 °F (36 4 °C) (Tympanic)   Ht 5' 7" (1 702 m)   Wt 83 5 kg (184 lb)   SpO2 97%   BMI 28 82 kg/m²     Physical Exam  Vitals and nursing note reviewed  Constitutional:       General: She is not in acute distress  Appearance: She is well-developed  She is not diaphoretic  HENT:      Head: Normocephalic and atraumatic  Eyes:      General:         Right eye: No discharge  Conjunctiva/sclera: Conjunctivae normal       Pupils: Pupils are equal, round, and reactive to light  Neck:      Thyroid: No thyromegaly  Cardiovascular:      Rate and Rhythm: Normal rate and regular rhythm  Pulmonary:      Effort: Pulmonary effort is normal  No respiratory distress  Breath sounds: Normal breath sounds  Musculoskeletal:      Cervical back: Normal range of motion  Lymphadenopathy:      Cervical: No cervical adenopathy  Skin:     General: Skin is warm and dry  Neurological:      Mental Status: She is alert and oriented to person, place, and time  Psychiatric:         Behavior: Behavior normal          Thought Content:  Thought content normal          Judgment: Judgment normal            Current Medications     Current Outpatient Medications:     lisinopril-hydrochlorothiazide (PRINZIDE,ZESTORETIC) 20-12 5 MG per tablet, TAKE 1 TABLET BY MOUTH DAILY, Disp: 90 tablet, Rfl: 1    NON FORMULARY, Take 150 mg by mouth daily Progesterone, Disp: , Rfl:     NON FORMULARY, 1 5 mg/mL Biestrogen 80/20 apply a small amount to fatty skin, Disp: , Rfl:    Cholecalciferol (VITAMIN D3) 2000 units capsule, Take 1 tablet by mouth daily (Patient not taking: Reported on 2/15/2022 ), Disp: , Rfl:     clobetasol (TEMOVATE) 0 05 % ointment, Apply BID x 2 weeks then nightly x 2 weeks for flare then 1-2 times a week for maintenance (Patient not taking: Reported on 8/17/2021), Disp: , Rfl:     estradiol (ESTRACE) 0 1 mg/g vaginal cream, Insert 2 g into the vagina daily (Patient not taking: Reported on 8/17/2021), Disp: , Rfl:     Estriol POWD, , Disp: , Rfl:     meloxicam (Mobic) 15 mg tablet, Take 1 tablet (15 mg total) by mouth daily, Disp: 30 tablet, Rfl: 2    Omega-3 Fatty Acids (FISH OIL) 1,000 mg, Take 1,000 mg by mouth daily   (Patient not taking: Reported on 8/17/2021), Disp: , Rfl:     Health Maintenance     Health Maintenance   Topic Date Due    DTaP,Tdap,and Td Vaccines (2 - Td or Tdap) 01/01/2020    Breast Cancer Screening: Mammogram  05/27/2021    Influenza Vaccine (1) 09/01/2021    Medicare Annual Wellness Visit (AWV)  12/11/2021    PT PLAN OF CARE  01/05/2022    Fall Risk  02/15/2023    Depression Screening  02/15/2023    BMI: Followup Plan  02/15/2023    BMI: Adult  02/15/2023    Colorectal Cancer Screening  09/21/2031    Hepatitis C Screening  Completed    Osteoporosis Screening  Completed    Pneumococcal Vaccine: 65+ Years  Completed    COVID-19 Vaccine  Completed    HIB Vaccine  Aged Out    Hepatitis B Vaccine  Aged Out    IPV Vaccine  Aged Out    Hepatitis A Vaccine  Aged Out    Meningococcal ACWY Vaccine  Aged Out    HPV Vaccine  Aged Out     Immunization History   Administered Date(s) Administered    COVID-19 MODERNA VACC 0 5 ML IM 01/25/2021, 02/22/2021, 10/28/2021    INFLUENZA 10/19/2015, 10/23/2017    Influenza Quadrivalent, 6-35 Months IM 10/19/2015    Influenza Split High Dose Preservative Free IM 10/23/2017    Influenza, high dose seasonal 0 7 mL 10/23/2018, 10/23/2019, 10/12/2020    Pneumococcal Conjugate 13-Valent 10/23/2017    Pneumococcal Polysaccharide PPV23 10/23/2018    Tdap 01/01/2010    Zoster 10/19/2015       Mable Johnson DO  [facility]

## 2022-02-15 NOTE — PROGRESS NOTES
Assessment and Plan:     Problem List Items Addressed This Visit     None      Visit Diagnoses     Medicare annual wellness visit, subsequent    -  Primary    Questionnaire reviewed  Immunization health screening history updated  Advance directive in place  BMI Counseling: Body mass index is 28 82 kg/m²  The BMI is above normal  Nutrition recommendations include decreasing portion sizes, encouraging healthy choices of fruits and vegetables, limiting drinks that contain sugar, moderation in carbohydrate intake and increasing intake of lean protein  Exercise recommendations include exercising 3-5 times per week  Rationale for BMI follow-up plan is due to patient being overweight or obese  Depression Screening and Follow-up Plan: Patient was screened for depression during today's encounter  They screened negative with a PHQ-2 score of 0  Preventive health issues were discussed with patient, and age appropriate screening tests were ordered as noted in patient's After Visit Summary  Personalized health advice and appropriate referrals for health education or preventive services given if needed, as noted in patient's After Visit Summary  History of Present Illness:     Patient presents for Welcome to Medicare visit  Patient Care Team:  Barber Price DO as PCP - General (Family Medicine)  Kristy Collet, PA-C (Family Medicine)     Review of Systems:     Review of Systems   Problem List:     Patient Active Problem List   Diagnosis    Hypertension    Hypertriglyceridemia    Vitamin D deficiency    PMB (postmenopausal bleeding)    Obesity (BMI 30 0-34  9)    Elevated glucose      Past Medical and Surgical History:     Past Medical History:   Diagnosis Date    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, right ([address] )     last assessed 06/05/13    Hypertension     Stress fracture of foot 08/15/2016     Past Surgical History:   Procedure Laterality Date    ANKLE SURGERY Left 1978    APPENDECTOMY  1965    CARPAL TUNNEL RELEASE Bilateral 2003    COLONOSCOPY  03/2016    normal    HAND SURGERY Bilateral 2007    ROTATOR CUFF REPAIR Right     TONSILLECTOMY  1963    TOTAL KNEE ARTHROPLASTY Left 04/14/2013    TOTAL KNEE ARTHROPLASTY Right 1997    VAGINAL DELIVERY        Family History:     Family History   Problem Relation Age of Onset    Lung cancer Sister     Heart disease Sister     Hypertension Paternal Grandmother     Melanoma Paternal Grandfather         In [place]u of the Skin    No Known Problems Mother     No Known Problems Father     Heart disease Maternal Grandmother     Colon cancer Sister     Atrial fibrillation Sister       Social History:     Social History     Socioeconomic History    Marital status: Single     Spouse name: None    Number of children: None    Years of education: None    Highest education level: None   Occupational History    Occupation: Retired   Tobacco Use    Smoking status: Never Smoker    Smokeless tobacco: Never Used   Vaping Use    Vaping Use: Never used   Substance and Sexual Activity    Alcohol use: Yes     Comment: social    Drug use: No    Sexual activity: None   Other Topics Concern    None   Social History Narrative    None     Social Determinants of Health     Financial Resource Strain: Not on file   Food Insecurity: Not on file   Transportation Needs: Not on file   Physical Activity: Not on file   Stress: Not on file   Social Connections: Not on file   Intimate Partner Violence: Not on file   Housing Stability: Not on file      Medications and Allergies:     Current Outpatient Medications   Medication Sig Dispense Refill    lisinopril-hydrochlorothiazide (PRINZIDE,ZESTORETIC) 20-12 5 MG per tablet TAKE 1 TABLET BY MOUTH DAILY 90 tablet 1    NON FORMULARY Take 150 mg by mouth daily Progesterone      NON FORMULARY 1 5 mg/mL Biestrogen 80/20 apply a small amount to fatty skin      Cholecalciferol (VITAMIN D3) 2000 units capsule Take 1 tablet by mouth daily (Patient not taking: Reported on 2/15/2022 )      clobetasol (TEMOVATE) 0 05 % ointment Apply BID x 2 weeks then nightly x 2 weeks for flare then 1-2 times a week for maintenance (Patient not taking: Reported on 8/17/2021)      estradiol (ESTRACE) 0 1 mg/g vaginal cream Insert 2 g into the vagina daily (Patient not taking: Reported on 8/17/2021)      Estriol POWD  (Patient not taking: Reported on 8/17/2021)      Omega-3 Fatty Acids (FISH OIL) 1,000 mg Take 1,000 mg by mouth daily   (Patient not taking: Reported on 8/17/2021)       No current facility-administered medications for this visit  No Known Allergies   Immunizations:     Immunization History   Administered Date(s) Administered    COVID-19 MODERNA VACC 0 5 ML IM 01/25/2021, 02/22/2021    INFLUENZA 10/19/2015, 10/23/2017    Influenza Quadrivalent, 6-35 Months IM 10/19/2015    Influenza Split High Dose Preservative Free IM 10/23/2017    Influenza, high dose seasonal 0 7 mL 10/23/2018, 10/23/2019, 10/12/2020    Pneumococcal Conjugate 13-Valent 10/23/2017    Pneumococcal Polysaccharide PPV23 10/23/2018    Tdap 01/01/2010    Zoster 10/19/2015      Health Maintenance:         Topic Date Due    Breast Cancer Screening: Mammogram  05/27/2021    Colorectal Cancer Screening  09/21/2031    Hepatitis C Screening  Completed         Topic Date Due    DTaP,Tdap,and Td Vaccines (2 - Td or Tdap) 01/01/2020    COVID-19 Vaccine (3 - Booster for Moderna series) 07/22/2021    Influenza Vaccine (1) 09/01/2021      Medicare Screening Tests and Risk Assessments:     Brooks Estevez is here for her Subsequent Wellness visit  Health Risk Assessment:   Patient rates overall health as very good  Patient feels that their physical health rating is same  Patient is very satisfied with their life  Eyesight was rated as same  Hearing was rated as same   Patient feels that their emotional and mental health rating is same  Patients states they are never, rarely angry  Patient states they are never, rarely unusually tired/fatigued  Pain experienced in the last 7 days has been some  Patient's pain rating has been 4/10  Patient states that she has experienced no weight loss or gain in last 6 months  Depression Screening:   PHQ-2 Score: 0      Fall Risk Screening: In the past year, patient has experienced: no history of falling in past year      Urinary Incontinence Screening:   Patient has not leaked urine accidently in the last six months  Home Safety:  Patient does not have trouble with stairs inside or outside of their home  Patient has working smoke alarms and has working carbon monoxide detector  Home safety hazards include: none  Nutrition:   Current diet is Regular  Medications:   Patient is currently taking over-the-counter supplements  OTC medications include: see medication list  Patient is able to manage medications  Activities of Daily Living (ADLs)/Instrumental Activities of Daily Living (IADLs):   Walk and transfer into and out of bed and chair?: Yes  Dress and groom yourself?: Yes    Bathe or shower yourself?: Yes    Feed yourself?  Yes  Do your laundry/housekeeping?: Yes  Manage your money, pay your bills and track your expenses?: Yes  Make your own meals?: Yes    Do your own shopping?: Yes    Previous Hospitalizations:   Any hospitalizations or ED visits within the last 12 months?: No      Advance Care Planning:   Living will: Yes    Advanced directive: Yes    Advanced directive counseling given: No    Five wishes given: Yes      Cognitive Screening:   Provider or family/friend/caregiver concerned regarding cognition?: No    PREVENTIVE SCREENINGS      Cardiovascular Screening:    General: Screening Not Indicated and History Lipid Disorder      Diabetes Screening:     General: Screening Current      Colorectal Cancer Screening:     General: Screening Current      Breast Cancer Screening: General: Screening Current      Cervical Cancer Screening:    General: Screening Not Indicated      Osteoporosis Screening:    General: Risks and Benefits Discussed      Abdominal Aortic Aneurysm (AAA) Screening:        General: Screening Not Indicated      Lung Cancer Screening:     General: Screening Not Indicated      Hepatitis C Screening:    General: Screening Current    Screening, Brief Intervention, and Referral to Treatment (SBIRT)    Screening  Typical number of drinks in a day: 0  Typical number of drinks in a week: 1  Interpretation: Low risk drinking behavior  Single Item Drug Screening:  How often have you used an illegal drug (including marijuana) or a prescription medication for non-medical reasons in the past year? never    Single Item Drug Screen Score: 0  Interpretation: Negative screen for possible drug use disorder    Brief Intervention  Alcohol & drug use screenings were reviewed  No concerns regarding substance use disorder identified       No exam data present     Physical Exam:     /74 (BP Location: Left arm, Patient Position: Sitting, Cuff Size: Standard)   Pulse 67   Temp 97 6 °F (36 4 °C) (Tympanic)   Ht 5' 7" (1 702 m)   Wt 83 5 kg (184 lb)   SpO2 97%   BMI 28 82 kg/m²     Physical Exam     Jyoti Goyal DO

## 2022-02-15 NOTE — PATIENT INSTRUCTIONS
Medicare Preventive Visit Patient Instructions  Thank you for completing your Welcome to Medicare Visit or Medicare Annual Wellness Visit today  Your next wellness visit will be due in one year (2/16/2023)  The screening/preventive services that you may require over the next 5-10 years are detailed below  Some tests may not apply to you based off risk factors and/or age  Screening tests ordered at today's visit but not completed yet may show as past due  Also, please note that scanned in results may not display below  Preventive Screenings:  Service Recommendations Previous Testing/Comments   Colorectal Cancer Screening  * Colonoscopy    * Fecal Occult Blood Test (FOBT)/Fecal Immunochemical Test (FIT)  * Fecal DNA/Cologuard Test  * Flexible Sigmoidoscopy Age: 54-65 years old   Colonoscopy: every 10 years (may be performed more frequently if at higher risk)  OR  FOBT/FIT: every 1 year  OR  Cologuard: every 3 years  OR  Sigmoidoscopy: every 5 years  Screening may be recommended earlier than age 48 if at higher risk for colorectal cancer  Also, an individualized decision between you and your healthcare provider will decide whether screening between the ages of 74-80 would be appropriate  Colonoscopy: 09/21/2021  FOBT/FIT: Not on file  Cologuard: Not on file  Sigmoidoscopy: Not on file    Screening Current     Breast Cancer Screening Age: 36 years old  Frequency: every 1-2 years  Not required if history of left and right mastectomy Mammogram: 05/27/2020    Screening Current   Cervical Cancer Screening Between the ages of 21-29, pap smear recommended once every 3 years  Between the ages of 33-67, can perform pap smear with HPV co-testing every 5 years     Recommendations may differ for women with a history of total hysterectomy, cervical cancer, or abnormal pap smears in past  Pap Smear: Not on file    Screening Not Indicated   Hepatitis C Screening Once for adults born between 1945 and 1965  More frequently in patients at high risk for Hepatitis C Hep C Antibody: 02/07/2022    Screening Current   Diabetes Screening 1-2 times per year if you're at risk for diabetes or have pre-diabetes Fasting glucose: No results in last 5 years   A1C: 5 8 %    Screening Current   Cholesterol Screening Once every 5 years if you don't have a lipid disorder  May order more often based on risk factors  Lipid panel: 08/26/2020    Screening Not Indicated  History Lipid Disorder     Other Preventive Screenings Covered by Medicare:  1  Abdominal Aortic Aneurysm (AAA) Screening: covered once if your at risk  You're considered to be at risk if you have a family history of AAA  2  Lung Cancer Screening: covers low dose CT scan once per year if you meet all of the following conditions: (1) Age 50-69; (2) No signs or symptoms of lung cancer; (3) Current smoker or have quit smoking within the last 15 years; (4) You have a tobacco smoking history of at least 30 pack years (packs per day multiplied by number of years you smoked); (5) You get a written order from a healthcare provider  3  Glaucoma Screening: covered annually if you're considered high risk: (1) You have diabetes OR (2) Family history of glaucoma OR (3)  aged 48 and older OR (3)  American aged 72 and older  3  Osteoporosis Screening: covered every 2 years if you meet one of the following conditions: (1) You're estrogen deficient and at risk for osteoporosis based off medical history and other findings; (2) Have a vertebral abnormality; (3) On glucocorticoid therapy for more than 3 months; (4) Have primary hyperparathyroidism; (5) On osteoporosis medications and need to assess response to drug therapy  · Last bone density test (DXA Scan): 04/09/2019   5  HIV Screening: covered annually if you're between the age of 15-65  Also covered annually if you are younger than 13 and older than 72 with risk factors for HIV infection   For pregnant patients, it is covered up to 3 times per pregnancy  Immunizations:  Immunization Recommendations   Influenza Vaccine Annual influenza vaccination during flu season is recommended for all persons aged >= 6 months who do not have contraindications   Pneumococcal Vaccine (Prevnar and Pneumovax)  * Prevnar = PCV13  * Pneumovax = PPSV23   Adults 25-60 years old: 1-3 doses may be recommended based on certain risk factors  Adults 72 years old: Prevnar (PCV13) vaccine recommended followed by Pneumovax (PPSV23) vaccine  If already received PPSV23 since turning 65, then PCV13 recommended at least one year after PPSV23 dose  Hepatitis B Vaccine 3 dose series if at intermediate or high risk (ex: diabetes, end stage renal disease, liver disease)   Tetanus (Td) Vaccine - COST NOT COVERED BY MEDICARE PART B Following completion of primary series, a booster dose should be given every 10 years to maintain immunity against tetanus  Td may also be given as tetanus wound prophylaxis  Tdap Vaccine - COST NOT COVERED BY MEDICARE PART B Recommended at least once for all adults  For pregnant patients, recommended with each pregnancy  Shingles Vaccine (Shingrix) - COST NOT COVERED BY MEDICARE PART B  2 shot series recommended in those aged 48 and above     Health Maintenance Due:      Topic Date Due    Breast Cancer Screening: Mammogram  05/27/2021    Colorectal Cancer Screening  09/21/2031    Hepatitis C Screening  Completed     Immunizations Due:      Topic Date Due    DTaP,Tdap,and Td Vaccines (2 - Td or Tdap) 01/01/2020    COVID-19 Vaccine (3 - Booster for Moderna series) 07/22/2021    Influenza Vaccine (1) 09/01/2021     Advance Directives   What are advance directives? Advance directives are legal documents that state your wishes and plans for medical care  These plans are made ahead of time in case you lose your ability to make decisions for yourself   Advance directives can apply to any medical decision, such as the treatments you want, and if you want to donate organs  What are the types of advance directives? There are many types of advance directives, and each state has rules about how to use them  You may choose a combination of any of the following:  · Living will: This is a written record of the treatment you want  You can also choose which treatments you do not want, which to limit, and which to stop at a certain time  This includes surgery, medicine, IV fluid, and tube feedings  · Durable power of  for healthcare [facility]): This is a written record that states who you want to make healthcare choices for you when you are unable to make them for yourself  This person, called a proxy, is usually a family member or a friend  You may choose more than 1 proxy  · Do not resuscitate (DNR) order:  A DNR order is used in case your heart stops beating or you stop breathing  It is a request not to have certain forms of treatment, such as CPR  A DNR order may be included in other types of advance directives  · Medical directive: This covers the care that you want if you are in a coma, near death, or unable to make decisions for yourself  You can list the treatments you want for each condition  Treatment may include pain medicine, surgery, blood transfusions, dialysis, IV or tube feedings, and a ventilator (breathing machine)  · Values history: This document has questions about your views, beliefs, and how you feel and think about life  This information can help others choose the care that you would choose  Why are advance directives important? An advance directive helps you control your care  Although spoken wishes may be used, it is better to have your wishes written down  Spoken wishes can be misunderstood, or not followed  Treatments may be given even if you do not want them  An advance directive may make it easier for your family to make difficult choices about your care     Weight Management   Why it is important to manage your weight:  Being overweight increases your risk of health conditions such as heart disease, high blood pressure, type 2 diabetes, and certain types of cancer  It can also increase your risk for osteoarthritis, sleep apnea, and other respiratory problems  Aim for a slow, steady weight loss  Even a small amount of weight loss can lower your risk of health problems  How to lose weight safely:  A safe and healthy way to lose weight is to eat fewer calories and get regular exercise  You can lose up about 1 pound a week by decreasing the number of calories you eat by 500 calories each day  Healthy meal plan for weight management:  A healthy meal plan includes a variety of foods, contains fewer calories, and helps you stay healthy  A healthy meal plan includes the following:  · Eat whole-grain foods more often  A healthy meal plan should contain fiber  Fiber is the part of grains, fruits, and vegetables that is not broken down by your body  Whole-grain foods are healthy and provide extra fiber in your diet  Some examples of whole-grain foods are whole-wheat breads and pastas, oatmeal, brown rice, and bulgur  · Eat a variety of vegetables every day  Include dark, leafy greens such as spinach, kale, mikael greens, and mustard greens  Eat yellow and orange vegetables such as carrots, sweet potatoes, and winter squash  · Eat a variety of fruits every day  Choose fresh or canned fruit (canned in its own juice or light syrup) instead of juice  Fruit juice has very little or no fiber  · Eat low-fat dairy foods  Drink fat-free (skim) milk or 1% milk  Eat fat-free yogurt and low-fat cottage cheese  Try low-fat cheeses such as mozzarella and other reduced-fat cheeses  · Choose meat and other protein foods that are low in fat  Choose beans or other legumes such as split peas or lentils  Choose fish, skinless poultry (chicken or turkey), or lean cuts of red meat (beef or pork)   Before you cook meat or poultry, cut off any visible fat  · Use less fat and oil  Try baking foods instead of frying them  Add less fat, such as margarine, sour cream, regular salad dressing and mayonnaise to foods  Eat fewer high-fat foods  Some examples of high-fat foods include french fries, doughnuts, ice cream, and cakes  · Eat fewer sweets  Limit foods and drinks that are high in sugar  This includes candy, cookies, regular soda, and sweetened drinks  Exercise:  Exercise at least 30 minutes per day on most days of the week  Some examples of exercise include walking, biking, dancing, and swimming  You can also fit in more physical activity by taking the stairs instead of the elevator or parking farther away from stores  Ask your healthcare provider about the best exercise plan for you  © Copyright [employer] 2018 Information is for End User's use only and may not be sold, redistributed or otherwise used for commercial purposes   All illustrations and images included in CareNotes® are the copyrighted property of [employer]  or [address] [place] [address]

## 2022-02-17 ENCOUNTER — APPOINTMENT (OUTPATIENT)
Dept: PHYSICAL THERAPY | Facility: MEDICAL CENTER | Age: 72
End: 2022-02-17
Payer: MEDICARE

## 2022-02-23 ENCOUNTER — OFFICE VISIT (OUTPATIENT)
Dept: PHYSICAL THERAPY | Facility: MEDICAL CENTER | Age: 72
End: 2022-02-23
Payer: MEDICARE

## 2022-02-23 DIAGNOSIS — G89.29 CHRONIC NECK PAIN: Primary | ICD-10-CM

## 2022-02-23 DIAGNOSIS — M54.2 CHRONIC NECK PAIN: Primary | ICD-10-CM

## 2022-02-23 PROCEDURE — 97110 THERAPEUTIC EXERCISES: CPT | Performed by: PHYSICAL THERAPIST

## 2022-02-23 PROCEDURE — 97140 MANUAL THERAPY 1/> REGIONS: CPT | Performed by: PHYSICAL THERAPIST

## 2022-02-23 PROCEDURE — 97010 HOT OR COLD PACKS THERAPY: CPT | Performed by: PHYSICAL THERAPIST

## 2022-02-23 NOTE — PROGRESS NOTES
Daily Note     Today's date: 2022  Patient name: Lorie Small  : 1950  MRN: [id number]  Referring provider: Fransisca Martínez DO  Dx:   Encounter Diagnosis     ICD-10-CM    1  Chronic neck pain  M54 2     G89 29                   Subjective: patient states that she has been doing great over the last 2 weeks until yesterday when she overdid some of her activities with her grandchild  neck pain and spasm today    Objective: See treatment diary below      Assessment: Tolerated treatment well  Patient demonstrated fatigue post treatment, exhibited good technique with therapeutic exercises and would benefit from continued PT  Symptoms today are much more muscular in nature  Spasm present with good cervical mobility  Plan: Continue per plan of care  Daily Treatment Diary     Manual              Cervical STM 10min            C3-4 UPA left Gr  Iv- 10sec x5            C3-5 UPA right Gr   Iv 10sec x5                                          Exercise Diary              UBE             pec stretch [id number]            No monnies 15x2 blue band            Shoulder extension 15x2 blue band            Scapular retractions 15x2 black band            Horizontal abduction 15x2 blue band            Chin tucks 15x2            Thoracic extension over foam roller 5secx3            Foam roller on wall 5 min                                                                                                                                                               Modalities              Heat supine 10min

## 2022-03-09 ENCOUNTER — OFFICE VISIT (OUTPATIENT)
Dept: PHYSICAL THERAPY | Facility: MEDICAL CENTER | Age: 72
End: 2022-03-09
Payer: MEDICARE

## 2022-03-09 DIAGNOSIS — M54.2 CHRONIC NECK PAIN: Primary | ICD-10-CM

## 2022-03-09 DIAGNOSIS — G89.29 CHRONIC NECK PAIN: Primary | ICD-10-CM

## 2022-03-09 PROCEDURE — 97010 HOT OR COLD PACKS THERAPY: CPT | Performed by: PHYSICAL THERAPIST

## 2022-03-09 PROCEDURE — 97140 MANUAL THERAPY 1/> REGIONS: CPT | Performed by: PHYSICAL THERAPIST

## 2022-03-09 PROCEDURE — 97110 THERAPEUTIC EXERCISES: CPT | Performed by: PHYSICAL THERAPIST

## 2022-03-09 NOTE — PROGRESS NOTES
Daily Note     Today's date: 3/9/2022  Patient name: Vince Santoro  : 1950  MRN: [id number]  Referring provider: Leo Butt DO  Dx:   Encounter Diagnosis     ICD-10-CM    1  Chronic neck pain  M54 2     G89 29                   Subjective: patient states that she has been doing great over the last 2 weeks until yesterday when she spent a long period with her head rotated talking to her son  Has made the muscles on the left side of her neck more sore  Objective: See treatment diary below      Assessment: Tolerated treatment well  Patient demonstrated fatigue post treatment, exhibited good technique with therapeutic exercises and would benefit from continued PT  Symptoms today are much more muscular in nature  Spasm present with good cervical mobility  Patient will be seen in 2 weeks for follow up  Plan: Continue per plan of care  Daily Treatment Diary     Manual              Cervical STM 10min            C3-4 UPA left Gr  Iv- 10sec x5            C3-5 UPA right Gr   Iv 10sec x5                                          Exercise Diary              UBE             pec stretch [id number]            No monnies 15x2 blue band            Shoulder extension 15x2 blue band            Scapular retractions 15x2 black band            Horizontal abduction 15x2 blue band            Chin tucks 15x2            Thoracic extension over foam roller 5secx3            Foam roller on wall 5 min                                                                                                                                                               Modalities              Heat supine 10min

## 2022-03-23 ENCOUNTER — OFFICE VISIT (OUTPATIENT)
Dept: PHYSICAL THERAPY | Facility: MEDICAL CENTER | Age: 72
End: 2022-03-23
Payer: MEDICARE

## 2022-03-23 DIAGNOSIS — G89.29 CHRONIC NECK PAIN: Primary | ICD-10-CM

## 2022-03-23 DIAGNOSIS — M54.2 CHRONIC NECK PAIN: Primary | ICD-10-CM

## 2022-03-23 PROCEDURE — 97010 HOT OR COLD PACKS THERAPY: CPT | Performed by: PHYSICAL THERAPIST

## 2022-03-23 PROCEDURE — 97140 MANUAL THERAPY 1/> REGIONS: CPT | Performed by: PHYSICAL THERAPIST

## 2022-03-23 PROCEDURE — 97110 THERAPEUTIC EXERCISES: CPT | Performed by: PHYSICAL THERAPIST

## 2022-03-23 NOTE — PROGRESS NOTES
Daily Note     Today's date: 3/23/2022  Patient name: Cally Seen  : 1950  MRN: [id number]  Referring provider: David Deng DO  Dx:   Encounter Diagnosis     ICD-10-CM    1  Chronic neck pain  M54 2     G89 29                   Subjective: patient states that she has been doing great over the last 2 weeks until Saturday when she had an increase in pain and spasm for a day  Has made the muscles on the left side of her neck more sore but otherwise she is back to normal     Objective: See treatment diary below      Assessment: Tolerated treatment well  Patient demonstrated fatigue post treatment, exhibited good technique with therapeutic exercises and would benefit from continued PT  Symptoms today are much more muscular in nature  Spasm present with good cervical mobility  Patient will be seen in 2 weeks for follow up if needed  Plan: Continue per plan of care  Daily Treatment Diary     Manual              Cervical STM 10min            C3-4 UPA left Gr  Iv- 10sec x5            C3-5 UPA right Gr   Iv 10sec x5                                          Exercise Diary              UBE             pec stretch [id number]            No monnies 15x2 blue band            Shoulder extension 15x2 blue band            Scapular retractions 15x2 black band            Horizontal abduction 15x2 blue band            Chin tucks 15x2            Thoracic extension over foam roller 5secx3            Foam roller on wall 5 min                                                                                                                                                               Modalities              Heat supine 10min

## 2022-03-28 DIAGNOSIS — I10 ESSENTIAL HYPERTENSION: ICD-10-CM

## 2022-03-28 RX ORDER — LISINOPRIL AND HYDROCHLOROTHIAZIDE 20; 12.5 MG/1; MG/1
1 TABLET ORAL DAILY
Qty: 90 TABLET | Refills: 1 | Status: SHIPPED | OUTPATIENT
Start: 2022-03-28 | End: 2022-07-11 | Stop reason: SDUPTHER

## 2022-05-09 DIAGNOSIS — M54.2 NECK PAIN: ICD-10-CM

## 2022-05-09 RX ORDER — MELOXICAM 15 MG/1
TABLET ORAL
Qty: 30 TABLET | Refills: 2 | Status: SHIPPED | OUTPATIENT
Start: 2022-05-09 | End: 2022-08-03

## 2022-07-11 DIAGNOSIS — I10 ESSENTIAL HYPERTENSION: ICD-10-CM

## 2022-07-11 RX ORDER — LISINOPRIL AND HYDROCHLOROTHIAZIDE 20; 12.5 MG/1; MG/1
1 TABLET ORAL DAILY
Qty: 90 TABLET | Refills: 1 | Status: SHIPPED | OUTPATIENT
Start: 2022-07-11 | End: 2022-10-10

## 2022-08-03 DIAGNOSIS — M54.2 NECK PAIN: ICD-10-CM

## 2022-08-03 RX ORDER — MELOXICAM 15 MG/1
TABLET ORAL
Qty: 30 TABLET | Refills: 2 | Status: SHIPPED | OUTPATIENT
Start: 2022-08-03 | End: 2022-08-04

## 2022-08-08 LAB — HBA1C MFR BLD HPLC: 6 %

## 2022-08-15 ENCOUNTER — OFFICE VISIT (OUTPATIENT)
Dept: FAMILY MEDICINE CLINIC | Facility: CLINIC | Age: 72
End: 2022-08-15
Payer: MEDICARE

## 2022-08-15 VITALS
SYSTOLIC BLOOD PRESSURE: 138 MMHG | DIASTOLIC BLOOD PRESSURE: 84 MMHG | WEIGHT: 189.2 LBS | BODY MASS INDEX: 30.41 KG/M2 | OXYGEN SATURATION: 90 % | TEMPERATURE: 97.8 F | HEART RATE: 116 BPM | HEIGHT: 66 IN

## 2022-08-15 DIAGNOSIS — R73.09 ELEVATED GLUCOSE: ICD-10-CM

## 2022-08-15 DIAGNOSIS — I10 PRIMARY HYPERTENSION: Primary | ICD-10-CM

## 2022-08-15 DIAGNOSIS — E78.1 HYPERTRIGLYCERIDEMIA: ICD-10-CM

## 2022-08-15 DIAGNOSIS — M54.2 NECK PAIN: ICD-10-CM

## 2022-08-15 PROCEDURE — 99214 OFFICE O/P EST MOD 30 MIN: CPT | Performed by: FAMILY MEDICINE

## 2022-08-15 RX ORDER — METHOCARBAMOL 500 MG/1
500 TABLET, FILM COATED ORAL 4 TIMES DAILY
Qty: 20 TABLET | Refills: 1 | Status: SHIPPED | OUTPATIENT
Start: 2022-08-15

## 2022-08-15 NOTE — ASSESSMENT & PLAN NOTE
Chronic neck pain  Continue with meloxicam   Add methocarbamol to use as needed for more acute flares

## 2022-08-15 NOTE — PROGRESS NOTES
[facility]      NAME: Asif Jones  AGE: 80 y o  SEX: female  : 1950   MRN: [id number]    DATE: 8/15/2022  TIME: 1:29 PM    Assessment and Plan     Problem List Items Addressed This Visit     Neck pain     Chronic neck pain  Continue with meloxicam   Add methocarbamol to use as needed for more acute flares  Relevant Medications    methocarbamol (ROBAXIN) 500 mg tablet    Hypertriglyceridemia     Lipids well controlled with LDL cholesterol less than 100 and triglycerides within normal limits  Hypertension - Primary     Well controlled on lisinopril hydrochlorothiazide  Relevant Orders    Comprehensive metabolic panel    TSH, 3rd generation with Free T4 reflex    Elevated glucose     Glucose stable with hemoglobin A1c of 6 0  Continue with diet and exercise         Relevant Orders    Hemoglobin A1C              Return to office in:  6 months, annual wellness    Chief Complaint     Chief Complaint   Patient presents with    Follow-up     6 month follow up    Neck Pain       History of Present Illness     Patient was seen for routine follow-up of chronic medical problems and to review recent blood work  She is being treated for hypertension, hyperlipidemia and elevated fasting glucose  She has no complaints and feels well  The following portions of the patient's history were reviewed and updated as appropriate: allergies, current medications, past family history, past medical history, past social history, past surgical history and problem list     Review of Systems   Review of Systems   Constitutional: Negative  Respiratory: Negative  Cardiovascular: Negative  Gastrointestinal: Negative  Genitourinary: Negative  Musculoskeletal: Negative  Psychiatric/Behavioral: Negative          Active Problem List     Patient Active Problem List   Diagnosis    Hypertension    Hypertriglyceridemia    Vitamin D deficiency    PMB (postmenopausal Problem: PAIN -   Goal: Displays adequate comfort level or baseline comfort level  Description: INTERVENTIONS:  - Perform pain scoring using age-appropriate tool with hands-on care as needed  Notify physician/AP of high pain scores not responsive to comfort measures  - Administer analgesics based on type and severity of pain and evaluate response  - Sucrose analgesia per protocol for brief minor painful procedures  - Teach parents interventions for comforting infant  Outcome: Progressing     Problem: THERMOREGULATION - /PEDIATRICS  Goal: Maintains normal body temperature  Description: Interventions:  - Monitor temperature (axillary for Newborns) as ordered  - Monitor for signs of hypothermia or hyperthermia  - Provide thermal support measures  - Wean to open crib when appropriate  Outcome: Progressing     Problem: INFECTION -   Goal: No evidence of infection  Description: INTERVENTIONS:  - Instruct family/visitors to use good hand hygiene technique  - Identify and instruct in appropriate isolation precautions for identified infection/condition  - Change incubator every 2 weeks or as needed  - Monitor for symptoms of infection  - Monitor surgical sites and insertion sites for all indwelling lines, tubes, and drains for drainage, redness, or edema   - Monitor endotracheal and nasal secretions for changes in amount and color  - Monitor culture and CBC results  - Administer antibiotics as ordered  Monitor drug levels  Outcome: Progressing     Problem: RISK FOR INFECTION (RISK FACTORS FOR MATERNAL CHORIOAMNIOITIS - )  Goal: No evidence of infection  Description: INTERVENTIONS:  - Instruct family/visitors to use good hand hygiene technique  - Monitor for symptoms of infection  - Monitor culture and CBC results  - Administer antibiotics as ordered    Monitor drug levels  Outcome: Progressing     Problem: SAFETY -   Goal: Patient will remain free from falls  Description: INTERVENTIONS:  - Instruct family/caregiver on patient safety  - Keep incubator doors and portholes closed when unattended  - Keep radiant warmer side rails and crib rails up when unattended  - Based on caregiver fall risk screen, instruct family/caregiver to ask for assistance with transferring infant if caregiver noted to have fall risk factors  Outcome: Progressing     Problem: Knowledge Deficit  Goal: Patient/family/caregiver demonstrates understanding of disease process, treatment plan, medications, and discharge instructions  Description: Complete learning assessment and assess knowledge base    Interventions:  - Provide teaching at level of understanding  - Provide teaching via preferred learning methods  Outcome: Progressing  Goal: Infant caregiver verbalizes understanding of benefits of skin-to-skin with healthy   Description: Prior to delivery, educate patient regarding skin-to-skin practice and its benefits  Initiate immediate and uninterrupted skin-to-skin contact after birth until breastfeeding is initiated or a minimum of one hour  Encourage continued skin-to-skin contact throughout the post partum stay    Outcome: Progressing  Goal: Infant caregiver verbalizes understanding of benefits and management of breastfeeding their healthy   Description: Help initiate breastfeeding within one hour of birth  Educate/assist with breastfeeding positioning and latch  Educate on safe positioning and to monitor their  for safety  Educate on how to maintain lactation even if they are  from their   Educate/initiate pumping for a mom with a baby in the NICU within 6 hours after birth  Give infants no food or drink other than breast milk unless medically indicated  Educate on feeding cues and encourage breastfeeding on demand    Outcome: Progressing  Goal: Infant caregiver verbalizes understanding of benefits to rooming-in with their healthy   Description: Promote rooming in 21 bleeding)    Obesity (BMI 30 0-34  9)    Elevated glucose    Neck pain       Objective   /84 (BP Location: Right arm, Patient Position: Sitting, Cuff Size: Adult)   Pulse (!) 116   Temp 97 8 °F (36 6 °C)   Ht 5' 6" (1 676 m)   Wt 85 8 kg (189 lb 3 2 oz)   SpO2 90%   BMI 30 54 kg/m²     Physical Exam  Vitals and nursing note reviewed  Constitutional:       General: She is not in acute distress  Appearance: She is well-developed  She is not diaphoretic  HENT:      Head: Normocephalic and atraumatic  Eyes:      General:         Right eye: No discharge  Conjunctiva/sclera: Conjunctivae normal       Pupils: Pupils are equal, round, and reactive to light  Neck:      Thyroid: No thyromegaly  Cardiovascular:      Rate and Rhythm: Normal rate and regular rhythm  Pulmonary:      Effort: Pulmonary effort is normal  No respiratory distress  Breath sounds: Normal breath sounds  Musculoskeletal:      Cervical back: Normal range of motion  Lymphadenopathy:      Cervical: No cervical adenopathy  Skin:     General: Skin is warm and dry  Neurological:      Mental Status: She is alert and oriented to person, place, and time  Psychiatric:         Behavior: Behavior normal          Thought Content:  Thought content normal          Judgment: Judgment normal            Current Medications     Current Outpatient Medications:     Cholecalciferol (VITAMIN D3) 2000 units capsule, Take 1 tablet by mouth daily, Disp: , Rfl:     lisinopril-hydrochlorothiazide (PRINZIDE,ZESTORETIC) 20-12 5 MG per tablet, Take 1 tablet by mouth daily, Disp: 90 tablet, Rfl: 1    meloxicam (MOBIC) 15 mg tablet, TAKE 1 TABLET(15 MG) BY MOUTH DAILY, Disp: 90 tablet, Rfl: 1    methocarbamol (ROBAXIN) 500 mg tablet, Take 1 tablet (500 mg total) by mouth 4 (four) times a day, Disp: 20 tablet, Rfl: 1    NON FORMULARY, Take 150 mg by mouth daily Progesterone (Patient not taking: Reported on 8/15/2022), Disp: , Rfl: Health Maintenance     Health Maintenance   Topic Date Due    Breast Cancer Screening: Mammogram  05/27/2021    PT PLAN OF CARE  01/05/2022    COVID-19 Vaccine (4 - Booster for Moderna series) 02/28/2022    Influenza Vaccine (1) 09/01/2022    Depression Screening  02/15/2023    BMI: Followup Plan  02/15/2023    Fall Risk  02/15/2023    Urinary Incontinence Screening  02/15/2023    Medicare Annual Wellness Visit (AWV)  02/15/2023    BMI: Adult  08/15/2023    Colorectal Cancer Screening  09/21/2031    Hepatitis C Screening  Completed    Osteoporosis Screening  Completed    Pneumococcal Vaccine: 65+ Years  Completed    HIB Vaccine  Aged Out    Hepatitis B Vaccine  Aged Out    IPV Vaccine  Aged Out    Hepatitis A Vaccine  Aged Out    Meningococcal ACWY Vaccine  Aged Out    HPV Vaccine  Aged Out     Immunization History   Administered Date(s) Administered    COVID-19 MODERNA VACC 0 5 ML IM 01/25/2021, 02/22/2021, 10/28/2021    INFLUENZA 10/19/2015, 10/23/2017    Influenza Quadrivalent, 6-35 Months IM 10/19/2015    Influenza Split High Dose Preservative Free IM 10/23/2017    Influenza, high dose seasonal 0 7 mL 10/23/2018, 10/23/2019, 10/12/2020    Pneumococcal Conjugate 13-Valent 10/23/2017    Pneumococcal Polysaccharide PPV23 10/23/2018    Tdap 01/01/2010    Zoster 10/19/2015       Maryanne Borrero DO  [facility] out of 24 hours per day  Educate on benefits to rooming-in  Provide  care in room with parents as long as infant and mother condition allow    Outcome: Progressing  Goal: Provide formula feeding instructions and preparation information to caregivers who do not wish to breastfeed their   Description: Provide one on one information on frequency, amount, and burping for formula feeding caregivers throughout their stay and at discharge  Provide written information/video on formula preparation  Outcome: Progressing  Goal: Infant caregiver verbalizes understanding of support and resources for follow up after discharge  Description: Provide individual discharge education on when to call the doctor  Provide resources and contact information for post-discharge support      Outcome: Progressing     Problem: DISCHARGE PLANNING  Goal: Discharge to home or other facility with appropriate resources  Description: INTERVENTIONS:  - Identify barriers to discharge w/patient and caregiver  - Arrange for needed discharge resources and transportation as appropriate  - Identify discharge learning needs (meds, wound care, etc )  - Arrange for interpretive services to assist at discharge as needed  - Refer to Case Management Department for coordinating discharge planning if the patient needs post-hospital services based on physician/advanced practitioner order or complex needs related to functional status, cognitive ability, or social support system  Outcome: Progressing     Problem: NORMAL   Goal: Experiences normal transition  Description: INTERVENTIONS:  - Monitor vital signs  - Maintain thermoregulation  - Assess for hypoglycemia risk factors or signs and symptoms  - Assess for sepsis risk factors or signs and symptoms  - Assess for jaundice risk and/or signs and symptoms  Outcome: Progressing  Goal: Total weight loss less than 10% of birth weight  Description: INTERVENTIONS:  - Assess feeding patterns  - Weigh daily  Outcome: Progressing     Problem: Adequate NUTRIENT INTAKE -   Goal: Nutrient/Hydration intake appropriate for improving, restoring or maintaining nutritional needs  Description: INTERVENTIONS:  - Assess growth and nutritional status of patients and recommend course of action  - Monitor nutrient intake, labs, and treatment plans  - Recommend appropriate diets and vitamin/mineral supplements  - Monitor and recommend adjustments to tube feedings and TPN/PPN based on assessed needs  - Provide specific nutrition education as appropriate  Outcome: Progressing  Goal: Breast feeding baby will demonstrate adequate intake  Description: Interventions:  - Monitor/record daily weights and I&O  - Monitor milk transfer  - Increase maternal fluid intake  - Increase breastfeeding frequency and duration  - Teach mother to massage breast before feeding/during infant pauses during feeding  - Pump breast after feeding  - Review breastfeeding discharge plan with mother   Refer to breast feeding support groups  - Initiate discussion/inform physician of weight loss and interventions taken  - Help mother initiate breast feeding within an hour of birth  - Encourage skin to skin time with  within 5 minutes of birth  - Give  no food or drink other than breast milk  - Encourage rooming in  - Encourage breast feeding on demand  - Initiate SLP consult as needed  Outcome: Progressing  Goal: Bottle fed baby will demonstrate adequate intake  Description: Interventions:  - Monitor/record daily weights and I&O  - Increase feeding frequency and volume  - Teach bottle feeding techniques to care provider/s  - Initiate discussion/inform physician of weight loss and interventions taken  - Initiate SLP consult as needed  Outcome: Progressing

## 2022-08-16 ENCOUNTER — TELEPHONE (OUTPATIENT)
Dept: ADMINISTRATIVE | Facility: OTHER | Age: 72
End: 2022-08-16

## 2022-08-16 NOTE — TELEPHONE ENCOUNTER
Upon review of the In Basket request we were able to locate, review, and update the patient chart as requested for Mammogram     Any additional questions or concerns should be emailed to the Practice Liaisons via [email or web address] email, please do not reply via In Basket      Thank you  Mikhail Smith

## 2022-08-16 NOTE — TELEPHONE ENCOUNTER
----- Message from Hilda Swan sent at 8/15/2022 12:54 PM EDT -----  Regarding: Care Gap Request  08/15/22 12:54 PM    Hello, our patient Amarilis Merlos has had Mammogram completed/performed  Please assist in updating the patient chart by pulling the Care Everywhere (CE) document  The date of service is 4/26/22       Thank you,  Gurwinder Auguste MA  [facility]

## 2022-08-16 NOTE — TELEPHONE ENCOUNTER
----- Message from Hilda Kline sent at 8/15/2022  1:02 PM EDT -----  Regarding: Care Gap Request  08/15/22 1:02 PM    Hello, our patient Taylor Shafer has had Mammogram completed/performed  Please assist in updating the patient chart by pulling the Care Everywhere (CE) document  The date of service is 8/16/21       Thank you,  Ronak Hoff MA  [facility]

## 2022-10-08 DIAGNOSIS — I10 ESSENTIAL HYPERTENSION: ICD-10-CM

## 2022-10-10 RX ORDER — LISINOPRIL AND HYDROCHLOROTHIAZIDE 20; 12.5 MG/1; MG/1
1 TABLET ORAL DAILY
Qty: 90 TABLET | Refills: 1 | Status: SHIPPED | OUTPATIENT
Start: 2022-10-10

## 2022-10-24 ENCOUNTER — OFFICE VISIT (OUTPATIENT)
Dept: FAMILY MEDICINE CLINIC | Facility: CLINIC | Age: 72
End: 2022-10-24
Payer: MEDICARE

## 2022-10-24 VITALS
BODY MASS INDEX: 31.25 KG/M2 | WEIGHT: 193.6 LBS | OXYGEN SATURATION: 98 % | TEMPERATURE: 97.7 F | HEART RATE: 75 BPM | DIASTOLIC BLOOD PRESSURE: 90 MMHG | SYSTOLIC BLOOD PRESSURE: 140 MMHG

## 2022-10-24 DIAGNOSIS — N39.0 FREQUENT UTI: ICD-10-CM

## 2022-10-24 DIAGNOSIS — R39.9 UTI SYMPTOMS: Primary | ICD-10-CM

## 2022-10-24 DIAGNOSIS — R39.9 UTI SYMPTOMS: ICD-10-CM

## 2022-10-24 LAB
SL AMB  POCT GLUCOSE, UA: NEGATIVE
SL AMB LEUKOCYTE ESTERASE,UA: ABNORMAL
SL AMB POCT BILIRUBIN,UA: NEGATIVE
SL AMB POCT BLOOD,UA: ABNORMAL
SL AMB POCT CLARITY,UA: ABNORMAL
SL AMB POCT COLOR,UA: YELLOW
SL AMB POCT KETONES,UA: NEGATIVE
SL AMB POCT NITRITE,UA: NEGATIVE
SL AMB POCT PH,UA: 5.5
SL AMB POCT SPECIFIC GRAVITY,UA: <=1.005
SL AMB POCT URINE PROTEIN: NEGATIVE
SL AMB POCT UROBILINOGEN: 0.2

## 2022-10-24 PROCEDURE — 99214 OFFICE O/P EST MOD 30 MIN: CPT | Performed by: FAMILY MEDICINE

## 2022-10-24 PROCEDURE — 81003 URINALYSIS AUTO W/O SCOPE: CPT | Performed by: FAMILY MEDICINE

## 2022-10-24 PROCEDURE — 87086 URINE CULTURE/COLONY COUNT: CPT | Performed by: FAMILY MEDICINE

## 2022-10-24 RX ORDER — SULFAMETHOXAZOLE AND TRIMETHOPRIM 800; 160 MG/1; MG/1
1 TABLET ORAL EVERY 12 HOURS SCHEDULED
Qty: 10 TABLET | Refills: 0 | Status: SHIPPED | OUTPATIENT
Start: 2022-10-24 | End: 2022-10-29

## 2022-10-24 RX ORDER — SULFAMETHOXAZOLE AND TRIMETHOPRIM 800; 160 MG/1; MG/1
1 TABLET ORAL EVERY 12 HOURS SCHEDULED
Qty: 10 TABLET | Refills: 0 | Status: SHIPPED | OUTPATIENT
Start: 2022-10-24 | End: 2022-10-24 | Stop reason: SDUPTHER

## 2022-10-24 NOTE — TELEPHONE ENCOUNTER
Medication Bactrim was sent to [facility] and [facility] was closed  Pt wants medication sent to [facility] in [place]

## 2022-10-24 NOTE — PROGRESS NOTES
Name: Moise Pallas      : 1950      MRN: [id number]  Encounter Provider: Joey Bethea DO  Encounter Date: 10/24/2022   Encounter department: [address] [place] [address]     1  UTI symptoms  -     POCT urine dip auto non-scope  -     Urine culture  -     sulfamethoxazole-trimethoprim (BACTRIM DS) 800-160 mg per tablet; Take 1 tablet by mouth every 12 (twelve) hours for 5 days    2  Frequent UTI  -     Ambulatory Referral to Urology; Future         Subjective      Patient presents with urinary tract infection symptoms including frequency urgency and some dysuria  Patient notes that she has had a history of this in the past which has been alleviated by hormone replacement  Due to cost of office visits and compound in supplement that she was given by her hormone replacement physician she discontinued the medication over a month ago and since then has had 2 occasions of urinary tract infection symptoms including her present symptoms today  Review of Systems   Genitourinary: Positive for frequency and urgency  Current Outpatient Medications on File Prior to Visit   Medication Sig   • Cholecalciferol (VITAMIN D3) 2000 units capsule Take 1 tablet by mouth daily   • lisinopril-hydrochlorothiazide (PRINZIDE,ZESTORETIC) 20-12 5 MG per tablet TAKE 1 TABLET BY MOUTH DAILY   • meloxicam (MOBIC) 15 mg tablet TAKE 1 TABLET(15 MG) BY MOUTH DAILY   • methocarbamol (ROBAXIN) 500 mg tablet Take 1 tablet (500 mg total) by mouth 4 (four) times a day   • NON FORMULARY Take 150 mg by mouth daily Progesterone (Patient not taking: Reported on 8/15/2022)       Objective     /90 (BP Location: Left arm, Patient Position: Sitting, Cuff Size: Adult)   Pulse 75   Temp 97 7 °F (36 5 °C)   Wt 87 8 kg (193 lb 9 6 oz)   SpO2 98%   BMI 31 25 kg/m²     Physical Exam  Vitals and nursing note reviewed  Constitutional:       General: She is not in acute distress       Appearance: She is well-developed  She is not diaphoretic  HENT:      Head: Normocephalic and atraumatic  Eyes:      General:         Right eye: No discharge  Conjunctiva/sclera: Conjunctivae normal       Pupils: Pupils are equal, round, and reactive to light  Neck:      Thyroid: No thyromegaly  Cardiovascular:      Rate and Rhythm: Normal rate and regular rhythm  Pulmonary:      Effort: Pulmonary effort is normal  No respiratory distress  Breath sounds: Normal breath sounds  Musculoskeletal:      Cervical back: Normal range of motion  Lymphadenopathy:      Cervical: No cervical adenopathy  Skin:     General: Skin is warm and dry  Neurological:      Mental Status: She is alert and oriented to person, place, and time  Psychiatric:         Behavior: Behavior normal          Thought Content:  Thought content normal          Judgment: Judgment normal        Soraya Ly, DO

## 2022-10-25 LAB — BACTERIA UR CULT: ABNORMAL

## 2023-01-16 ENCOUNTER — OFFICE VISIT (OUTPATIENT)
Dept: UROLOGY | Facility: MEDICAL CENTER | Age: 73
End: 2023-01-16

## 2023-01-16 VITALS
SYSTOLIC BLOOD PRESSURE: 132 MMHG | HEART RATE: 78 BPM | DIASTOLIC BLOOD PRESSURE: 84 MMHG | OXYGEN SATURATION: 99 % | HEIGHT: 66 IN | BODY MASS INDEX: 30.86 KG/M2 | WEIGHT: 192 LBS

## 2023-01-16 DIAGNOSIS — N39.0 FREQUENT UTI: Primary | ICD-10-CM

## 2023-01-16 LAB
POST-VOID RESIDUAL VOLUME, ML POC: 22 ML
SL AMB  POCT GLUCOSE, UA: NORMAL
SL AMB LEUKOCYTE ESTERASE,UA: NORMAL
SL AMB POCT BILIRUBIN,UA: NORMAL
SL AMB POCT BLOOD,UA: NORMAL
SL AMB POCT CLARITY,UA: CLEAR
SL AMB POCT COLOR,UA: YELLOW
SL AMB POCT KETONES,UA: NORMAL
SL AMB POCT NITRITE,UA: NORMAL
SL AMB POCT PH,UA: 5.5
SL AMB POCT SPECIFIC GRAVITY,UA: >=1.03
SL AMB POCT URINE PROTEIN: NORMAL
SL AMB POCT UROBILINOGEN: 0.2

## 2023-01-16 NOTE — PROGRESS NOTES
1/16/2023      Chief Complaint   Patient presents with   • recurrent urinary tract infection         Assessment and Plan    71 y o  female new patient     1  Recurrent UTIs   · Urine today: negative   · Continue [place] daily supplements  · Reviewed proper  hygiene  · Recommend daily water intake 40-60 oz  · Standing urine culture ordered  · Renal US ordered  · Follow up in 3 months  History of Present Illness  David Romero is a 71 y o  female here for evaluation of recurrent UTIs  Patient believes her triggers for recurrent UTIs are secondary to vaginal dryness  She has a history of postoperative blood clot while on OCPs and has not had any subsequent issues  She noticed improvement in her vaginal dryness on oral hormones but had to discontinue due to poor insurance coverage  She noticed shortly after discontinuing the oral hormones, she developed urinary tract infections  She has been on [place] daily supplements for approximately 1 month  She states that prior to taking the daily supplement she would have a urinary tract infection about every 3 weeks but it seems to have slightly improved to every 4 weeks  Her recent urine cultures are listed below  She currently denies any dysuria, frequency, urgency, or gross hematuria  She denies any fevers or chills  She does have acute cystitis she experiences urinary frequency, urgency, and the sensation of incomplete bladder emptying  Review of Systems   Constitutional: Negative for chills and fever  HENT: Negative  Respiratory: Negative  Cardiovascular: Negative  Gastrointestinal: Negative for abdominal pain, nausea and vomiting  Genitourinary: Negative for difficulty urinating, dysuria, flank pain, frequency, hematuria and urgency  Musculoskeletal: Negative  Skin: Negative  Neurological: Negative        Urine cultures:   1/1/23: >100K E coli   11/12/22: >100K E coli   10/24/22: <10K gram negative vianey   9/30/22: >100K E coli 8/28/20: 50-59K mixed contaminants   8/17/20: >100K lactobacillus  6/24/20: 70-79K E coli     Vitals  Vitals:    01/16/23 1345   BP: 132/84   BP Location: Left arm   Patient Position: Sitting   Cuff Size: Adult   Pulse: 78   SpO2: 99%   Weight: 87 1 kg (192 lb)   Height: 5' 6" (1 676 m)       Physical Exam  Vitals reviewed  Constitutional:       General: She is not in acute distress  Appearance: Normal appearance  She is not ill-appearing, toxic-appearing or diaphoretic  HENT:      Head: Normocephalic and atraumatic  Eyes:      Conjunctiva/sclera: Conjunctivae normal    Pulmonary:      Effort: Pulmonary effort is normal  No respiratory distress  Abdominal:      General: There is no distension  Palpations: Abdomen is soft  Tenderness: There is no abdominal tenderness  There is no right CVA tenderness, left CVA tenderness, guarding or rebound  Musculoskeletal:         General: Normal range of motion  Cervical back: Normal range of motion  Skin:     General: Skin is warm and dry  Neurological:      General: No focal deficit present  Mental Status: She is alert and oriented to person, place, and time  Psychiatric:         Mood and Affect: Mood normal          Behavior: Behavior normal          Thought Content:  Thought content normal          Judgment: Judgment normal        Past History  Past Medical History:   Diagnosis Date   • 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, right ([address] )     last assessed 06/05/13   • Hypertension    • Stress fracture of foot 08/15/2016     Social History     Socioeconomic History   • Marital status: Single     Spouse name: None   • Number of children: None   • Years of education: None   • Highest education level: None   Occupational History   • Occupation: Retired   Tobacco Use   • Smoking status: Never   • Smokeless tobacco: Never   Vaping Use   • Vaping Use: Never used   Substance and Sexual Activity   • Alcohol use: Not Currently     Comment: social   • Drug use: No   • Sexual activity: None   Other Topics Concern   • None   Social History Narrative   • None     Social Determinants of Health     Financial Resource Strain: Not on file   Food Insecurity: Not on file   Transportation Needs: Not on file   Physical Activity: Not on file   Stress: Not on file   Social Connections: Not on file   Intimate Partner Violence: Not on file   Housing Stability: Not on file     Social History     Tobacco Use   Smoking Status Never   Smokeless Tobacco Never     Family History   Problem Relation Age of Onset   • Lung cancer Sister    • Heart disease Sister    • Hypertension Paternal Grandmother    • Melanoma Paternal Grandfather         In Situ of the Skin   • No Known Problems Mother    • No Known Problems Father    • Heart disease Maternal Grandmother    • Colon cancer Sister    • Atrial fibrillation Sister        The following portions of the patient's history were reviewed and updated as appropriate: allergies, current medications, past medical history, past social history, past surgical history and problem list     Results  No results found for this or any previous visit (from the past 1 hour(s))  ]  No results found for: PSA  No results found for: GLUCOSE, CALCIUM, NA, K, CO2, CL, BUN, CREATININE  Lab Results   Component Value Date    WBC 6 67 05/14/2018    HGB 14 0 05/14/2018    HCT 43 9 05/14/2018    MCV 95 05/14/2018     05/14/2018     Nita Terry PA-C

## 2023-01-26 ENCOUNTER — HOSPITAL ENCOUNTER (OUTPATIENT)
Dept: ULTRASOUND IMAGING | Facility: MEDICAL CENTER | Age: 73
Discharge: HOME | End: 2023-01-26

## 2023-01-26 DIAGNOSIS — N39.0 FREQUENT UTI: ICD-10-CM

## 2023-02-03 DIAGNOSIS — M54.2 NECK PAIN: ICD-10-CM

## 2023-02-03 RX ORDER — MELOXICAM 15 MG/1
TABLET ORAL
Qty: 90 TABLET | Refills: 1 | Status: SHIPPED | OUTPATIENT
Start: 2023-02-03

## 2023-02-15 ENCOUNTER — OFFICE VISIT (OUTPATIENT)
Dept: PHYSICAL THERAPY | Facility: MEDICAL CENTER | Age: 73
End: 2023-02-15

## 2023-02-15 ENCOUNTER — TELEPHONE (OUTPATIENT)
Dept: FAMILY MEDICINE CLINIC | Facility: CLINIC | Age: 73
End: 2023-02-15

## 2023-02-15 DIAGNOSIS — M25.562 ACUTE PAIN OF LEFT KNEE: Primary | ICD-10-CM

## 2023-02-15 NOTE — TELEPHONE ENCOUNTER
Additional Information  • Negative: Is this related to a work injury? • Negative: Is this related to an MVA? • Negative: Are you currently recieving homecare services? • Negative: Has the patient had unexplained weight loss? • Negative: Does the patient have a fever? • Negative: Is the patient experiencing urine retention? • Negative: Is the patient experiencing acute drop foot or paralysis?     Protocols used: [facility] COMPREHENSIVE SPINE PROGRAM PROTOCOL

## 2023-02-15 NOTE — TELEPHONE ENCOUNTER
Patient called requesting lab orders if needed for March follow up appointment  Please notify patient once lab orders are placed

## 2023-02-15 NOTE — TELEPHONE ENCOUNTER
Additional Information  • Negative: Is this related to a work injury? • Negative: Is this related to an MVA? • Negative: Are you currently recieving homecare services? • Negative: Has the patient had unexplained weight loss? • Negative: Does the patient have a fever?     Protocols used: [facility] COMPREHENSIVE SPINE PROGRAM PROTOCOL

## 2023-02-15 NOTE — PROGRESS NOTES
PT Evaluation     Today's date: 2/15/2023  Patient name: Anne Coleman  : 1950  MRN: [id number]  Referring provider: Gabriele Sin, PT  Dx:   Encounter Diagnosis     ICD-10-CM    1  Acute pain of left knee  M25 562                      Assessment/Plan  Patient is a very pleasant 72 yo female who presents with left medial knee pain secondary to hamstring tendinitis and poor hip muscular control  Patient was found to have weakness in her hip abductors and ER and poor functional control of knee medial and laterally  patient is also walking with a flexion contracture of the left knee  Patient will benefit from skilled PT services to address issues of pain and return to higher level of function  She should be seen 2x a week for 4 weeks  Subjective  Patient states that she is having some discomfort today but overall better than it has been  Pain and discomfort is mostly with yoga and walking  pickleball isn't too bad but the combination of things can be very painful and keeps her awake at night  She would like to return to exercise without pain  She has seen dr Elina Junior who did her TKA who suggested NSAIDS  Objective  Red Flags: none   Pain: 2-6/10  Reflexes: 2+BLE  Posture: left knee flexion contracture with standing and walking 5deg  AROM: left knee full hips full   PROM: WFL  PAROM: WFL  Palpation: TTP of medial hamstring insertion   Special Tests: +hamstring eccentric pain,   Coordination of Movement/strengthe: left hip abduction and ER 3+/5 with quick fatigability  Poor perturbation control     Goals:  - Pt I with initial HEP in 1-2 visits  - walking for exercise without pain  - return to pickle ball without pain         Precautions: B TKA 10years ago    Patient was K-taped

## 2023-02-15 NOTE — TELEPHONE ENCOUNTER
Additional Information  • Negative: Is this related to a work injury? • Negative: Is this related to an MVA? • Negative: Are you currently recieving homecare services?     Protocols used: [facility] COMPREHENSIVE SPINE PROGRAM PROTOCOL

## 2023-02-15 NOTE — TELEPHONE ENCOUNTER
Additional Information  • Negative: Is this related to a work injury?     Protocols used: [facility] COMPREHENSIVE SPINE PROGRAM PROTOCOL

## 2023-02-15 NOTE — TELEPHONE ENCOUNTER
Additional Information  • Negative: Is this related to a work injury? • Negative: Is this related to an MVA?     Protocols used: [facility] COMPREHENSIVE SPINE PROGRAM PROTOCOL

## 2023-02-15 NOTE — TELEPHONE ENCOUNTER
Additional Information  • Negative: Is this related to a work injury? • Negative: Is this related to an MVA? • Negative: Are you currently recieving homecare services? • Negative: Has the patient had unexplained weight loss?     Protocols used:  [facility] COMPREHENSIVE SPINE PROGRAM PROTOCOL

## 2023-02-15 NOTE — TELEPHONE ENCOUNTER
Additional Information  • Negative: Is this related to a work injury? • Negative: Is this related to an MVA? • Negative: Are you currently recieving homecare services? • Negative: Has the patient had unexplained weight loss? • Negative: Does the patient have a fever? • Negative: Is the patient experiencing urine retention?     Protocols used: [facility] COMPREHENSIVE SPINE PROGRAM PROTOCOL

## 2023-02-15 NOTE — TELEPHONE ENCOUNTER
Additional Information  • Negative: Is this related to a work injury? • Negative: Is this related to an MVA? • Negative: Are you currently recieving homecare services? • Negative: Has the patient had unexplained weight loss? • Negative: Does the patient have a fever?     Protocols used: [facility] COMPREHENSIVE SPINE PROGRAM PROTOCOL

## 2023-02-15 NOTE — TELEPHONE ENCOUNTER
Additional Information  • Negative: Is this related to a work injury? • Negative: Is this related to an MVA? • Negative: Are you currently recieving homecare services? • Negative: Has the patient had unexplained weight loss? • Negative: Does the patient have a fever? • Negative: Is the patient experiencing urine retention?     Protocols used: [facility] COMPREHENSIVE SPINE PROGRAM PROTOCOL

## 2023-02-15 NOTE — TELEPHONE ENCOUNTER
Additional Information  • Negative: Is this related to a work injury? • Negative: Is this related to an MVA?     Protocols used: [facility] COMPREHENSIVE SPINE PROGRAM PROTOCOL

## 2023-02-20 ENCOUNTER — OFFICE VISIT (OUTPATIENT)
Dept: PHYSICAL THERAPY | Facility: MEDICAL CENTER | Age: 73
End: 2023-02-20

## 2023-02-20 DIAGNOSIS — M25.562 ACUTE PAIN OF LEFT KNEE: Primary | ICD-10-CM

## 2023-02-20 NOTE — PROGRESS NOTES
Daily Note     Today's date: 2023  Patient name: Lorie Small  : 1950  MRN: [id number]  Referring provider: Jaz Sanabria, PT  Dx:   Encounter Diagnosis     ICD-10-CM    1  Acute pain of left knee  M25 562                      Subjective: patient states that she is feeling a little sore in her knee today  It is tender and the tape didn't seem to help too much  Objective: See treatment diary below      Assessment: Tolerated treatment well  Patient demonstrated fatigue post treatment, exhibited good technique with therapeutic exercises and would benefit from continued PT  Adjustment in tape was made  Focus on hip and core stability  Plan: Continue per plan of care        Precautions: B TKA 10years ago           Daily Treatment Diary     Manual                                                                                   Exercise Diary              Hip flexor str standing lunge 10sec x3            Hamstring str hip hinge 10sec x3            Start stepping 10x                                                   Bridging with ball between knees 15x2            Clamshell with band 10x2            Standing hip abduction 10x2            Standing hip extension 10x2                                      Bike 5min                                                                                                           Modalities                           Moist heat

## 2023-02-21 ENCOUNTER — HOSPITAL ENCOUNTER (OUTPATIENT)
Dept: MRI IMAGING | Facility: HOSPITAL | Age: 73
Discharge: HOME | End: 2023-02-21

## 2023-02-21 DIAGNOSIS — K76.89 LIVER CYST: ICD-10-CM

## 2023-02-21 RX ADMIN — GADOBUTROL 8 ML: 604.72 INJECTION INTRAVENOUS at 11:58

## 2023-02-22 ENCOUNTER — OFFICE VISIT (OUTPATIENT)
Dept: PHYSICAL THERAPY | Facility: MEDICAL CENTER | Age: 73
End: 2023-02-22

## 2023-02-22 DIAGNOSIS — M25.562 ACUTE PAIN OF LEFT KNEE: Primary | ICD-10-CM

## 2023-02-22 NOTE — PROGRESS NOTES
Daily Note     Today's date: 2023  Patient name: Hoang Rod  : 1950  MRN: [id number]  Referring provider: Stephenie Swann, PT  Dx:   Encounter Diagnosis     ICD-10-CM    1  Acute pain of left knee  M25 562                      Subjective: patient states that she is feeling a little sore in her knee today  It is tender and the tape didn't seem to help too much  Objective: See treatment diary below      Assessment: Tolerated treatment well  Patient demonstrated fatigue post treatment, exhibited good technique with therapeutic exercises and would benefit from continued PT  Adjustment in tape was made  Focus on hip and core stability  Plan: Continue per plan of care        Precautions: B TKA 10years ago           Daily Treatment Diary     Manual                                                                                   Exercise Diary              Hip flexor str standing lunge 10sec x3            Hamstring str hip hinge 10sec x3            Start stepping 10x                                                   Bridging with ball between knees 15x2            Clamshell with band 10x2            Standing hip abduction 10x2            Standing hip extension 10x2                                      Bike 5min                                                                                                           Modalities                           Moist heat

## 2023-02-27 ENCOUNTER — OFFICE VISIT (OUTPATIENT)
Dept: PHYSICAL THERAPY | Facility: MEDICAL CENTER | Age: 73
End: 2023-02-27

## 2023-02-27 DIAGNOSIS — M25.562 ACUTE PAIN OF LEFT KNEE: Primary | ICD-10-CM

## 2023-02-27 NOTE — PROGRESS NOTES
Daily Note     Today's date: 2023  Patient name: Vik Lo  : 1950  MRN: [id number]  Referring provider: Alanis Fu, PT  Dx:   Encounter Diagnosis     ICD-10-CM    1  Acute pain of left knee  M25 562                      Subjective: patient states that she is feeling better having less pain with all activities  Objective: See treatment diary below      Assessment: Tolerated treatment well  Patient demonstrated fatigue post treatment, exhibited good technique with therapeutic exercises and would benefit from continued PT  Adjustment in tape was made  Focus on hip and core stability  Plan: Continue per plan of care        Precautions: B TKA 10years ago           Daily Treatment Diary     Manual                                                                                   Exercise Diary              Hip flexor str standing lunge 10sec x3            Hamstring str hip hinge 10sec x3            Start stepping 10x                                      Eccentric hamstring curl 10x2            Bridging with ball between knees 15x2            Clamshell with band 10x2            Standing hip abduction 10x2            Standing hip extension 10x2                                      Bike 6min                                                                                                           Modalities                           Moist heat

## 2023-03-01 ENCOUNTER — APPOINTMENT (OUTPATIENT)
Dept: LAB | Facility: MEDICAL CENTER | Age: 73
End: 2023-03-01

## 2023-03-01 ENCOUNTER — OFFICE VISIT (OUTPATIENT)
Dept: PHYSICAL THERAPY | Facility: MEDICAL CENTER | Age: 73
End: 2023-03-01

## 2023-03-01 DIAGNOSIS — M25.562 ACUTE PAIN OF LEFT KNEE: Primary | ICD-10-CM

## 2023-03-01 DIAGNOSIS — I10 PRIMARY HYPERTENSION: ICD-10-CM

## 2023-03-01 DIAGNOSIS — R73.09 ELEVATED GLUCOSE: ICD-10-CM

## 2023-03-01 LAB — TSH SERPL DL<=0.05 MIU/L-ACNC: 2.74 UIU/ML (ref 0.45–4.5)

## 2023-03-01 NOTE — PROGRESS NOTES
Daily Note     Today's date: 3/1/2023  Patient name: David Romero  : 1950  MRN: [id number]  Referring provider: Pat Jose, PT  Dx:   Encounter Diagnosis     ICD-10-CM    1  Acute pain of left knee  M25 562                      Subjective: patient states that she is feeling better having less pain with all activities  Playing pickle ball with less pain  Objective: See treatment diary below      Assessment: Tolerated treatment well  Patient demonstrated fatigue post treatment, exhibited good technique with therapeutic exercises and would benefit from continued PT  Adjustment in tape was made  Focus on hip and core stability  Plan: Continue per plan of care        Precautions: B TKA 10years ago           Daily Treatment Diary     Manual                                                                                   Exercise Diary              Hip flexor str standing lunge 10sec x3            Hamstring str hip hinge 10sec x3            Start stepping 10x                                      Eccentric hamstring curl 10x2            Bridging with ball between knees 15x2            Clamshell with band 10x2            Standing hip abduction 10x2            Standing hip extension 10x2                                      Bike 6min                                                                                                           Modalities                           Moist heat

## 2023-03-02 LAB
EST. AVERAGE GLUCOSE BLD GHB EST-MCNC: 120 MG/DL
HBA1C MFR BLD: 5.8 %

## 2023-03-10 ENCOUNTER — OFFICE VISIT (OUTPATIENT)
Dept: FAMILY MEDICINE CLINIC | Facility: CLINIC | Age: 73
End: 2023-03-10

## 2023-03-10 VITALS
DIASTOLIC BLOOD PRESSURE: 80 MMHG | SYSTOLIC BLOOD PRESSURE: 130 MMHG | HEIGHT: 67 IN | RESPIRATION RATE: 16 BRPM | TEMPERATURE: 97.7 F | WEIGHT: 190 LBS | HEART RATE: 76 BPM | OXYGEN SATURATION: 98 % | BODY MASS INDEX: 29.82 KG/M2

## 2023-03-10 DIAGNOSIS — E78.1 HYPERTRIGLYCERIDEMIA: ICD-10-CM

## 2023-03-10 DIAGNOSIS — Z00.00 MEDICARE ANNUAL WELLNESS VISIT, SUBSEQUENT: ICD-10-CM

## 2023-03-10 DIAGNOSIS — I10 PRIMARY HYPERTENSION: Primary | ICD-10-CM

## 2023-03-10 DIAGNOSIS — R73.09 ELEVATED GLUCOSE: ICD-10-CM

## 2023-03-10 NOTE — PROGRESS NOTES
Assessment and Plan:     Problem List Items Addressed This Visit     Hypertriglyceridemia    Hypertension - Primary    Elevated glucose   Other Visit Diagnoses     Medicare annual wellness visit, subsequent        Questionnaire reviewed  Immunization health screening history updated  Advance directive in place  BMI Counseling: Body mass index is 29 82 kg/m²  The BMI is above normal  Nutrition recommendations include encouraging healthy choices of fruits and vegetables, moderation in carbohydrate intake and increasing intake of lean protein  Exercise recommendations include exercising 3-5 times per week  Rationale for BMI follow-up plan is due to patient being overweight or obese  Depression Screening and Follow-up Plan: Patient was screened for depression during today's encounter  They screened negative with a PHQ-2 score of 0  Preventive health issues were discussed with patient, and age appropriate screening tests were ordered as noted in patient's After Visit Summary  Personalized health advice and appropriate referrals for health education or preventive services given if needed, as noted in patient's After Visit Summary  History of Present Illness:     Patient presents for a Medicare Wellness Visit    HPI   Patient Care Team:  Pollo Arreguin DO as PCP - General (Family Medicine)  Gina Larkin PA-C (Family Medicine)     Review of Systems:     Review of Systems     Problem List:     Patient Active Problem List   Diagnosis   • Hypertension   • Hypertriglyceridemia   • Vitamin D deficiency   • PMB (postmenopausal bleeding)   • Obesity (BMI 30 0-34  9)   • Elevated glucose   • Neck pain      Past Medical and Surgical History:     Past Medical History:   Diagnosis Date   • 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, right ([address] )     last assessed 06/05/13   • Hypertension    • Stress fracture of foot 08/15/2016     Past Surgical History:   Procedure Laterality Date   • ANKLE SURGERY Left 1978   • APPENDECTOMY  1965   • CARPAL TUNNEL RELEASE Bilateral 2003   • COLONOSCOPY  03/2016    normal   • COLONOSCOPY  09/22/2021    Diverticulosis by Dr Thompson Patches   • [facility] Bilateral 2007   • ROTATOR CUFF REPAIR Right    • TONSILLECTOMY  1963   • TOTAL KNEE ARTHROPLASTY Left 04/14/2013   • TOTAL KNEE ARTHROPLASTY Right 1997   • VAGINAL DELIVERY        Family History:     Family History   Problem Relation Age of Onset   • Lung cancer Sister    • Heart disease Sister    • Hypertension Paternal Grandmother    • Melanoma Paternal Grandfather         [place] of the Skin   • No Known Problems Mother    • No Known Problems Father    • Heart disease Maternal Grandmother    • Colon cancer Sister    • Atrial fibrillation Sister       Social History:     Social History     Socioeconomic History   • Marital status: Single     Spouse name: None   • Number of children: None   • Years of education: None   • Highest education level: None   Occupational History   • Occupation: Retired   Tobacco Use   • Smoking status: Never   • Smokeless tobacco: Never   Vaping Use   • Vaping Use: Never used   Substance and Sexual Activity   • Alcohol use: Not Currently     Comment: social, occ glass of wine   • Drug use: No   • Sexual activity: None   Other Topics Concern   • None   Social History Narrative   • None     Social Determinants of Health     Financial Resource Strain: Not on file   Food Insecurity: Not on file   Transportation Needs: Not on file   Physical Activity: Not on file   Stress: Not on file   Social Connections: Not on file   Intimate Partner Violence: Not on file   Housing Stability: Not on file      Medications and Allergies:     Current Outpatient Medications   Medication Sig Dispense Refill   • Cholecalciferol (VITAMIN D3) 2000 units capsule Take 1 tablet by mouth daily     • lisinopril-hydrochlorothiazide (PRINZIDE,ZESTORETIC) 20-12 5 MG per tablet TAKE 1 TABLET BY MOUTH DAILY 90 tablet 1   • meloxicam (MOBIC) 15 mg tablet TAKE 1 TABLET(15 MG) BY MOUTH DAILY 90 tablet 1   • methocarbamol (ROBAXIN) 500 mg tablet Take 1 tablet (500 mg total) by mouth 4 (four) times a day (Patient not taking: Reported on 1/16/2023) 20 tablet 1   • NON FORMULARY Take 150 mg by mouth daily Progesterone (Patient not taking: Reported on 8/15/2022)       No current facility-administered medications for this visit  No Known Allergies   Immunizations:     Immunization History   Administered Date(s) Administered   • COVID-19 MODERNA VACC 0 5 ML IM 01/25/2021, 02/22/2021, 10/28/2021   • COVID-19 Moderna Vac BIVALENT 12 Yr+ IM (BOOSTER ONLY) 0 5 ML 09/23/2022   • COVID-19, unspecified 10/28/2021   • INFLUENZA 10/19/2015, 10/23/2017, 10/13/2021   • Influenza Quadrivalent, 6-35 Months IM 10/19/2015   • Influenza Split High Dose Preservative Free IM 10/23/2017, 10/23/2019, 10/12/2020   • Influenza, high dose seasonal 0 7 mL 10/23/2018, 10/23/2019, 10/12/2020   • Pneumococcal Conjugate 13-Valent 10/23/2017   • Pneumococcal Polysaccharide PPV23 10/23/2018   • Tdap 01/01/2010   • Zoster 10/19/2015   • Zoster Vaccine Recombinant 11/10/2022      Health Maintenance:         Topic Date Due   • Breast Cancer Screening: Mammogram  08/17/2023   • Colorectal Cancer Screening  09/21/2031   • Hepatitis C Screening  Completed         Topic Date Due   • DTaP,Tdap,and Td Vaccines (2 - Td or Tdap) 01/01/2020   • Influenza Vaccine (1) 09/01/2022      Medicare Screening Tests and Risk Assessments:     Tita Dancer is here for her Subsequent Wellness visit  Last Medicare Wellness visit information reviewed, patient interviewed and updates made to the record today  Health Risk Assessment:   Patient rates overall health as good  Patient feels that their physical health rating is same  Patient is satisfied with their life  Eyesight was rated as same  Hearing was rated as same   Patient feels that their emotional and mental health rating is same  Patients states they are never, rarely angry  Patient states they are never, rarely unusually tired/fatigued  Pain experienced in the last 7 days has been none  Patient states that she has experienced no weight loss or gain in last 6 months  Depression Screening:   PHQ-2 Score: 0      Fall Risk Screening: In the past year, patient has experienced: no history of falling in past year      Urinary Incontinence Screening:   Patient has not leaked urine accidently in the last six months  Home Safety:  Patient does not have trouble with stairs inside or outside of their home  Patient has working smoke alarms and has working carbon monoxide detector  Home safety hazards include: none  Nutrition:   Current diet is Regular  Medications:   Patient is currently taking over-the-counter supplements  OTC medications include: see medication list  Patient is able to manage medications  Activities of Daily Living (ADLs)/Instrumental Activities of Daily Living (IADLs):   Walk and transfer into and out of bed and chair?: Yes  Dress and groom yourself?: Yes    Bathe or shower yourself?: Yes    Feed yourself? Yes  Do your laundry/housekeeping?: Yes  Manage your money, pay your bills and track your expenses?: Yes  Make your own meals?: Yes    Do your own shopping?: Yes    Previous Hospitalizations:   Any hospitalizations or ED visits within the last 12 months?: No      Advance Care Planning:   Living will: Yes    Durable POA for healthcare:  Yes    Advanced directive: Yes    Advanced directive counseling given: Yes    Five wishes given: Yes    End of Life Decisions reviewed with patient: Yes    Provider agrees with end of life decisions: Yes      Cognitive Screening:   Provider or family/friend/caregiver concerned regarding cognition?: No    PREVENTIVE SCREENINGS      Cardiovascular Screening:    General: Screening Not Indicated and History Lipid Disorder      Diabetes Screening:     General: Screening Current      Colorectal Cancer Screening:     General: Screening Current      Breast Cancer Screening:     General: Screening Current      Cervical Cancer Screening:    General: Screening Not Indicated      Osteoporosis Screening:    General: Risks and Benefits Discussed      Abdominal Aortic Aneurysm (AAA) Screening:        General: Screening Not Indicated      Lung Cancer Screening:     General: Screening Not Indicated      Hepatitis C Screening:    General: Screening Current    Screening, Brief Intervention, and Referral to Treatment (SBIRT)    Screening    Typical number of drinks in a week: 0    Single Item Drug Screening:  How often have you used an illegal drug (including marijuana) or a prescription medication for non-medical reasons in the past year? never    Single Item Drug Screen Score: 0  Interpretation: Negative screen for possible drug use disorder    Brief Intervention  Alcohol & drug use screenings were reviewed  No concerns regarding substance use disorder identified  No results found       Physical Exam:     /80 (BP Location: Left arm, Patient Position: Sitting, Cuff Size: Adult)   Pulse 76   Temp 97 7 °F (36 5 °C) (Temporal)   Resp 16   Ht 5' 6 93" (1 7 m)   Wt 86 2 kg (190 lb)   SpO2 98%   BMI 29 82 kg/m²     Physical Exam     Isai Rooney DO

## 2023-03-10 NOTE — PROGRESS NOTES
Name: Vik Lo      : 1950      MRN: [id number]  Encounter Provider: Aristides Shearer DO  Encounter Date: 3/10/2023   Encounter department: [address] [place] [address]     1  Primary hypertension  Assessment & Plan:  Blood pressure well controlled on lisinopril hydrochlorothiazide  5       2  Medicare annual wellness visit, subsequent  Comments:  Questionnaire reviewed  Immunization health screening history updated  Advance directive in place  3  Elevated glucose  Assessment & Plan:  Hemoglobin A1c improved to 5 8  Continue with diet and exercise  Orders:  -     Hemoglobin A1C; Future; Expected date: 2023    4  Hypertriglyceridemia  Assessment & Plan:  Continue to manage with diet and exercise  Next labs in 6 months  Orders:  -     Comprehensive metabolic panel; Future; Expected date: 2023  -     Lipid Panel with Direct LDL reflex; Future; Expected date: 2023  -     TSH, 3rd generation; Future; Expected date: 2023        Depression Screening and Follow-up Plan: Patient was screened for depression during today's encounter  They screened negative with a PHQ-2 score of 0  Subjective      Patient was seen for routine follow-up of chronic medical problems  She is being treated for hypertension  She has arthritis involving various joints including her neck  She has left knee pain with prior knee replacement  She has been going to physical therapy for her neck and is improved  She has had issues with frequent UTIs for which she is following with urology though she has been well for 2 months  Ovarian cyst found incidentally on ultrasound being followed by gynecology  Has intravaginal pelvic ultrasound scheduled with follow-up afterwards with GYN  Review of Systems   Constitutional: Negative  Respiratory: Negative  Cardiovascular: Negative  Gastrointestinal: Negative  Genitourinary: Negative      Musculoskeletal: Positive for arthralgias and neck pain  Psychiatric/Behavioral: Negative  Current Outpatient Medications on File Prior to Visit   Medication Sig   • Cholecalciferol (VITAMIN D3) 2000 units capsule Take 1 tablet by mouth daily   • lisinopril-hydrochlorothiazide (PRINZIDE,ZESTORETIC) 20-12 5 MG per tablet TAKE 1 TABLET BY MOUTH DAILY   • meloxicam (MOBIC) 15 mg tablet TAKE 1 TABLET(15 MG) BY MOUTH DAILY   • methocarbamol (ROBAXIN) 500 mg tablet Take 1 tablet (500 mg total) by mouth 4 (four) times a day (Patient not taking: Reported on 1/16/2023)   • NON FORMULARY Take 150 mg by mouth daily Progesterone (Patient not taking: Reported on 8/15/2022)       Objective     /80 (BP Location: Left arm, Patient Position: Sitting, Cuff Size: Adult)   Pulse 76   Temp 97 7 °F (36 5 °C) (Temporal)   Resp 16   Ht 5' 6 93" (1 7 m)   Wt 86 2 kg (190 lb)   SpO2 98%   BMI 29 82 kg/m²     Physical Exam  Vitals and nursing note reviewed  Constitutional:       General: She is not in acute distress  Appearance: She is well-developed  She is not diaphoretic  HENT:      Head: Normocephalic and atraumatic  Eyes:      General:         Right eye: No discharge  Conjunctiva/sclera: Conjunctivae normal       Pupils: Pupils are equal, round, and reactive to light  Neck:      Thyroid: No thyromegaly  Cardiovascular:      Rate and Rhythm: Normal rate and regular rhythm  Pulmonary:      Effort: Pulmonary effort is normal  No respiratory distress  Breath sounds: Normal breath sounds  Musculoskeletal:      Cervical back: Normal range of motion  Lymphadenopathy:      Cervical: No cervical adenopathy  Skin:     General: Skin is warm and dry  Neurological:      Mental Status: She is alert and oriented to person, place, and time  Psychiatric:         Behavior: Behavior normal          Thought Content:  Thought content normal          Judgment: Judgment normal        Stevan Hernandes DO

## 2023-03-10 NOTE — PATIENT INSTRUCTIONS
Medicare Preventive Visit Patient Instructions  Thank you for completing your Welcome to Medicare Visit or Medicare Annual Wellness Visit today  Your next wellness visit will be due in one year (3/10/2024)  The screening/preventive services that you may require over the next 5-10 years are detailed below  Some tests may not apply to you based off risk factors and/or age  Screening tests ordered at today's visit but not completed yet may show as past due  Also, please note that scanned in results may not display below  Preventive Screenings:  Service Recommendations Previous Testing/Comments   Colorectal Cancer Screening  * Colonoscopy    * Fecal Occult Blood Test (FOBT)/Fecal Immunochemical Test (FIT)  * Fecal DNA/Cologuard Test  * Flexible Sigmoidoscopy Age: 39-70 years old   Colonoscopy: every 10 years (may be performed more frequently if at higher risk)  OR  FOBT/FIT: every 1 year  OR  Cologuard: every 3 years  OR  Sigmoidoscopy: every 5 years  Screening may be recommended earlier than age 39 if at higher risk for colorectal cancer  Also, an individualized decision between you and your healthcare provider will decide whether screening between the ages of 74-80 would be appropriate  Colonoscopy: 09/21/2021  FOBT/FIT: Not on file  Cologuard: Not on file  Sigmoidoscopy: Not on file    Screening Current     Breast Cancer Screening Age: 36 years old  Frequency: every 1-2 years  Not required if history of left and right mastectomy Mammogram: 08/17/2022    Screening Current   Cervical Cancer Screening Between the ages of 21-29, pap smear recommended once every 3 years  Between the ages of 33-67, can perform pap smear with HPV co-testing every 5 years     Recommendations may differ for women with a history of total hysterectomy, cervical cancer, or abnormal pap smears in past  Pap Smear: Not on file    Screening Not Indicated   Hepatitis C Screening Once for adults born between 1945 and 1965  More frequently in patients at high risk for Hepatitis C Hep C Antibody: 02/07/2022    Screening Current   Diabetes Screening 1-2 times per year if you're at risk for diabetes or have pre-diabetes Fasting glucose: No results in last 5 years (No results in last 5 years)  A1C: 5 8 % (3/1/2023)  Screening Current   Cholesterol Screening Once every 5 years if you don't have a lipid disorder  May order more often based on risk factors  Lipid panel: 08/26/2020    Screening Not Indicated  History Lipid Disorder     Other Preventive Screenings Covered by Medicare:  1  Abdominal Aortic Aneurysm (AAA) Screening: covered once if your at risk  You're considered to be at risk if you have a family history of AAA  2  Lung Cancer Screening: covers low dose CT scan once per year if you meet all of the following conditions: (1) Age 50-69; (2) No signs or symptoms of lung cancer; (3) Current smoker or have quit smoking within the last 15 years; (4) You have a tobacco smoking history of at least 20 pack years (packs per day multiplied by number of years you smoked); (5) You get a written order from a healthcare provider  3  Glaucoma Screening: covered annually if you're considered high risk: (1) You have diabetes OR (2) Family history of glaucoma OR (3)  aged 48 and older OR (3)  American aged 72 and older  3  Osteoporosis Screening: covered every 2 years if you meet one of the following conditions: (1) You're estrogen deficient and at risk for osteoporosis based off medical history and other findings; (2) Have a vertebral abnormality; (3) On glucocorticoid therapy for more than 3 months; (4) Have primary hyperparathyroidism; (5) On osteoporosis medications and need to assess response to drug therapy  · Last bone density test (DXA Scan): 04/09/2019   5  HIV Screening: covered annually if you're between the age of 15-65  Also covered annually if you are younger than 13 and older than 72 with risk factors for HIV infection   For pregnant patients, it is covered up to 3 times per pregnancy  Immunizations:  Immunization Recommendations   Influenza Vaccine Annual influenza vaccination during flu season is recommended for all persons aged >= 6 months who do not have contraindications   Pneumococcal Vaccine   * Pneumococcal conjugate vaccine = PCV13 (Prevnar 13), PCV15 (Vaxneuvance), PCV20 (Prevnar 20)  * Pneumococcal polysaccharide vaccine = PPSV23 (Pneumovax) Adults 25-60 years old: 1-3 doses may be recommended based on certain risk factors  Adults 72 years old: 1-2 doses may be recommended based off what pneumonia vaccine you previously received   Hepatitis B Vaccine 3 dose series if at intermediate or high risk (ex: diabetes, end stage renal disease, liver disease)   Tetanus (Td) Vaccine - COST NOT COVERED BY MEDICARE PART B Following completion of primary series, a booster dose should be given every 10 years to maintain immunity against tetanus  Td may also be given as tetanus wound prophylaxis  Tdap Vaccine - COST NOT COVERED BY MEDICARE PART B Recommended at least once for all adults  For pregnant patients, recommended with each pregnancy  Shingles Vaccine (Shingrix) - COST NOT COVERED BY MEDICARE PART B  2 shot series recommended in those aged 48 and above     Health Maintenance Due:      Topic Date Due   • Breast Cancer Screening: Mammogram  08/17/2023   • Colorectal Cancer Screening  09/21/2031   • Hepatitis C Screening  Completed     Immunizations Due:      Topic Date Due   • DTaP,Tdap,and Td Vaccines (2 - Td or Tdap) 01/01/2020   • Influenza Vaccine (1) 09/01/2022     Advance Directives   What are advance directives? Advance directives are legal documents that state your wishes and plans for medical care  These plans are made ahead of time in case you lose your ability to make decisions for yourself  Advance directives can apply to any medical decision, such as the treatments you want, and if you want to donate organs  What are the types of advance directives? There are many types of advance directives, and each state has rules about how to use them  You may choose a combination of any of the following:  · Living will: This is a written record of the treatment you want  You can also choose which treatments you do not want, which to limit, and which to stop at a certain time  This includes surgery, medicine, IV fluid, and tube feedings  · Durable power of  for healthcare [facility]): This is a written record that states who you want to make healthcare choices for you when you are unable to make them for yourself  This person, called a proxy, is usually a family member or a friend  You may choose more than 1 proxy  · Do not resuscitate (DNR) order:  A DNR order is used in case your heart stops beating or you stop breathing  It is a request not to have certain forms of treatment, such as CPR  A DNR order may be included in other types of advance directives  · Medical directive: This covers the care that you want if you are in a coma, near death, or unable to make decisions for yourself  You can list the treatments you want for each condition  Treatment may include pain medicine, surgery, blood transfusions, dialysis, IV or tube feedings, and a ventilator (breathing machine)  · Values history: This document has questions about your views, beliefs, and how you feel and think about life  This information can help others choose the care that you would choose  Why are advance directives important? An advance directive helps you control your care  Although spoken wishes may be used, it is better to have your wishes written down  Spoken wishes can be misunderstood, or not followed  Treatments may be given even if you do not want them  An advance directive may make it easier for your family to make difficult choices about your care     Weight Management   Why it is important to manage your weight:  Being overweight increases your risk of health conditions such as heart disease, high blood pressure, type 2 diabetes, and certain types of cancer  It can also increase your risk for osteoarthritis, sleep apnea, and other respiratory problems  Aim for a slow, steady weight loss  Even a small amount of weight loss can lower your risk of health problems  How to lose weight safely:  A safe and healthy way to lose weight is to eat fewer calories and get regular exercise  You can lose up about 1 pound a week by decreasing the number of calories you eat by 500 calories each day  Healthy meal plan for weight management:  A healthy meal plan includes a variety of foods, contains fewer calories, and helps you stay healthy  A healthy meal plan includes the following:  · Eat whole-grain foods more often  A healthy meal plan should contain fiber  Fiber is the part of grains, fruits, and vegetables that is not broken down by your body  Whole-grain foods are healthy and provide extra fiber in your diet  Some examples of whole-grain foods are whole-wheat breads and pastas, oatmeal, brown rice, and bulgur  · Eat a variety of vegetables every day  Include dark, leafy greens such as spinach, kale, mikael greens, and mustard greens  Eat yellow and orange vegetables such as carrots, sweet potatoes, and winter squash  · Eat a variety of fruits every day  Choose fresh or canned fruit (canned in its own juice or light syrup) instead of juice  Fruit juice has very little or no fiber  · Eat low-fat dairy foods  Drink fat-free (skim) milk or 1% milk  Eat fat-free yogurt and low-fat cottage cheese  Try low-fat cheeses such as mozzarella and other reduced-fat cheeses  · Choose meat and other protein foods that are low in fat  Choose beans or other legumes such as split peas or lentils  Choose fish, skinless poultry (chicken or turkey), or lean cuts of red meat (beef or pork)  Before you cook meat or poultry, cut off any visible fat     · Use less fat and oil   Try baking foods instead of frying them  Add less fat, such as margarine, sour cream, regular salad dressing and mayonnaise to foods  Eat fewer high-fat foods  Some examples of high-fat foods include french fries, doughnuts, ice cream, and cakes  · Eat fewer sweets  Limit foods and drinks that are high in sugar  This includes candy, cookies, regular soda, and sweetened drinks  Exercise:  Exercise at least 30 minutes per day on most days of the week  Some examples of exercise include walking, biking, dancing, and swimming  You can also fit in more physical activity by taking the stairs instead of the elevator or parking farther away from stores  Ask your healthcare provider about the best exercise plan for you  © Copyright [employer] 2018 Information is for End User's use only and may not be sold, redistributed or otherwise used for commercial purposes   All illustrations and images included in CareNotes® are the copyrighted property of A D A M , Inc  or [address] [place] [address]

## 2023-03-15 ENCOUNTER — OFFICE VISIT (OUTPATIENT)
Dept: PHYSICAL THERAPY | Facility: MEDICAL CENTER | Age: 73
End: 2023-03-15

## 2023-03-15 DIAGNOSIS — M25.562 ACUTE PAIN OF LEFT KNEE: Primary | ICD-10-CM

## 2023-03-15 NOTE — PROGRESS NOTES
Daily Note     Today's date: 3/15/2023  Patient name: Chioma Yang  : 1950  MRN: [id number]  Referring provider: Abbe Pope PT  Dx:   Encounter Diagnosis     ICD-10-CM    1  Acute pain of left knee  [id number]                      Subjective: patient states that she is irritated her lower back on Monday and she has been having discomfort since that time  Left knee is doing ok  Objective: See treatment diary below      Assessment: focus of today's treatment was gentle movement of the lumbar spine patient appears to have muscle strain  Symptoms improved with heat and stretching  Plan: Continue per plan of care        Precautions: B TKA 10years ago           Daily Treatment Diary     Manual                                                                                   Exercise Diary              Hip flexor str standing lunge 10sec x3            Hamstring str hip hinge 10sec x3            Start stepping             LTR 10x3                         Eccentric hamstring curl             Bridging with ball between knees             Clamshell with band             Standing hip abduction             Standing hip extension                                       Bike                                                                                                            Modalities                           Moist heat

## 2023-04-26 ENCOUNTER — OFFICE VISIT (OUTPATIENT)
Dept: UROLOGY | Facility: MEDICAL CENTER | Age: 73
End: 2023-04-26
Payer: MEDICARE

## 2023-04-26 VITALS
BODY MASS INDEX: 29.82 KG/M2 | WEIGHT: 190 LBS | HEIGHT: 67 IN | SYSTOLIC BLOOD PRESSURE: 130 MMHG | HEART RATE: 70 BPM | DIASTOLIC BLOOD PRESSURE: 80 MMHG | OXYGEN SATURATION: 96 %

## 2023-04-26 DIAGNOSIS — N39.0 FREQUENT UTI: Primary | ICD-10-CM

## 2023-04-26 LAB
SL AMB  POCT GLUCOSE, UA: ABNORMAL
SL AMB LEUKOCYTE ESTERASE,UA: ABNORMAL
SL AMB POCT BILIRUBIN,UA: ABNORMAL
SL AMB POCT BLOOD,UA: ABNORMAL
SL AMB POCT CLARITY,UA: CLEAR
SL AMB POCT COLOR,UA: YELLOW
SL AMB POCT KETONES,UA: ABNORMAL
SL AMB POCT NITRITE,UA: ABNORMAL
SL AMB POCT PH,UA: 6
SL AMB POCT SPECIFIC GRAVITY,UA: <=1.005
SL AMB POCT URINE PROTEIN: ABNORMAL
SL AMB POCT UROBILINOGEN: 0.2

## 2023-04-26 PROCEDURE — 81003 URINALYSIS AUTO W/O SCOPE: CPT | Performed by: PHYSICIAN ASSISTANT

## 2023-04-26 PROCEDURE — 99213 OFFICE O/P EST LOW 20 MIN: CPT | Performed by: PHYSICIAN ASSISTANT

## 2023-04-26 NOTE — PROGRESS NOTES
4/26/2023      Chief Complaint   Patient presents with   • Follow-up     3 month follow, for the UTI         Assessment and Plan    71 y o  female managed by our office     1  Recurrent UTIs   • Urine today: trace leuks, trace blood   • Continue [place] daily supplements  • Reviewed proper  hygiene  • Continue daily water intake 40-60 oz  • Standing urine culture ordered  • F/u in 1 year        History of Present Illness  Shyla Forrester is a 71 y o  female here for follow-up  Patient was seen as a new patient on 1- for evaluation of recurrent UTIs  Patient believes her triggers for recurrent UTIs are secondary to vaginal dryness  She has a history of postoperative blood clot while on OCPs and has not had any subsequent issues  She noticed improvement in her vaginal dryness on oral hormones but had to discontinue due to poor insurance coverage  She noticed shortly after discontinuing the oral hormones, she developed urinary tract infections  She has been on [place] daily supplements for approximately 1 month  She states that prior to taking the daily supplement she would have a urinary tract infection about every 3 weeks but it seems to have slightly improved to every 4 weeks  See culture data below  US unremarkable  Has noticed increased frequency with increasing fluids  She denies any infection since her previous visit  Urine cultures:   1/1/23: >100K E coli   11/12/22: >100K E coli   10/24/22: <10K gram negative vianey   9/30/22: >100K E coli   8/28/20: 50-59K mixed contaminants   8/17/20: >100K lactobacillus  6/24/20: 70-79K E coli     Review of Systems   Constitutional: Negative for chills and fever  HENT: Negative  Respiratory: Negative  Cardiovascular: Negative  Gastrointestinal: Negative for abdominal pain, nausea and vomiting  Genitourinary: Positive for frequency (with inc fluid intake )  Negative for difficulty urinating, dysuria, flank pain and hematuria     Musculoskeletal: "Negative  Skin: Negative  Neurological: Negative  Vitals  Vitals:    04/26/23 1037   BP: 130/80   BP Location: Left arm   Patient Position: Sitting   Pulse: 70   SpO2: 96%   Weight: 86 2 kg (190 lb)   Height: 5' 6 93\" (1 7 m)       Physical Exam  Vitals reviewed  Constitutional:       General: She is not in acute distress  Appearance: Normal appearance  She is not ill-appearing, toxic-appearing or diaphoretic  HENT:      Head: Normocephalic and atraumatic  Eyes:      Conjunctiva/sclera: Conjunctivae normal    Pulmonary:      Effort: Pulmonary effort is normal  No respiratory distress  Abdominal:      General: There is no distension  Palpations: Abdomen is soft  Tenderness: There is no abdominal tenderness  There is no right CVA tenderness, left CVA tenderness, guarding or rebound  Musculoskeletal:         General: Normal range of motion  Cervical back: Normal range of motion  Skin:     General: Skin is warm and dry  Neurological:      General: No focal deficit present  Mental Status: She is alert and oriented to person, place, and time  Psychiatric:         Mood and Affect: Mood normal          Behavior: Behavior normal          Thought Content:  Thought content normal          Judgment: Judgment normal        Past History  Past Medical History:   Diagnosis Date   • 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, right ([address] )     last assessed 06/05/13   • Hypertension    • Stress fracture of foot 08/15/2016     Social History     Socioeconomic History   • Marital status: Single     Spouse name: None   • Number of children: None   • Years of education: None   • Highest education level: None   Occupational History   • Occupation: Retired   Tobacco Use   • Smoking status: Never   • Smokeless tobacco: Never   Vaping Use   • Vaping Use: Never used   Substance and Sexual Activity   • Alcohol use: Not Currently     Comment: social, occ glass of " wine   • Drug use: No   • Sexual activity: None   Other Topics Concern   • None   Social History Narrative   • None     Social Determinants of Health     Financial Resource Strain: Not on file   Food Insecurity: Not on file   Transportation Needs: Not on file   Physical Activity: Not on file   Stress: Not on file   Social Connections: Not on file   Intimate Partner Violence: Not on file   Housing Stability: Not on file     Social History     Tobacco Use   Smoking Status Never   Smokeless Tobacco Never     Family History   Problem Relation Age of Onset   • Lung cancer Sister    • Heart disease Sister    • Hypertension Paternal Grandmother    • Melanoma Paternal Grandfather         In Situ of the Skin   • No Known Problems Mother    • No Known Problems Father    • Heart disease Maternal Grandmother    • Colon cancer Sister    • Atrial fibrillation Sister        The following portions of the patient's history were reviewed and updated as appropriate: allergies, current medications, past medical history, past social history, past surgical history and problem list     Results  Recent Results (from the past 1 hour(s))   POCT urine dip auto non-scope    Collection Time: 04/26/23 10:43 AM   Result Value Ref Range     COLOR,UA yellow     CLARITY,UA clear     SPECIFIC GRAVITY,UA <=1 005      PH,UA 6 0     LEUKOCYTE ESTERASE,UA trace     NITRITE,UA neg     GLUCOSE, UA neg     KETONES,UA neg     BILIRUBIN,UA neg     BLOOD,UA trace-lysed     POCT URINE PROTEIN neg     SL AMB POCT UROBILINOGEN 0 2    ]  No results found for: PSA  No results found for: GLUCOSE, CALCIUM, NA, K, CO2, CL, BUN, CREATININE  Lab Results   Component Value Date    WBC 6 67 05/14/2018    HGB 14 0 05/14/2018    HCT 43 9 05/14/2018    MCV 95 05/14/2018     05/14/2018     Liat Greer PA-C

## 2023-07-25 DIAGNOSIS — I10 ESSENTIAL HYPERTENSION: ICD-10-CM

## 2023-07-25 RX ORDER — LISINOPRIL AND HYDROCHLOROTHIAZIDE 20; 12.5 MG/1; MG/1
1 TABLET ORAL DAILY
Qty: 90 TABLET | Refills: 1 | Status: SHIPPED | OUTPATIENT
Start: 2023-07-25

## 2023-09-05 ENCOUNTER — OFFICE VISIT (OUTPATIENT)
Dept: PHYSICAL THERAPY | Facility: MEDICAL CENTER | Age: 73
End: 2023-09-05
Payer: MEDICARE

## 2023-09-05 DIAGNOSIS — G89.29 CHRONIC LEFT-SIDED LOW BACK PAIN WITHOUT SCIATICA: Primary | ICD-10-CM

## 2023-09-05 DIAGNOSIS — M54.50 CHRONIC LEFT-SIDED LOW BACK PAIN WITHOUT SCIATICA: Primary | ICD-10-CM

## 2023-09-05 PROCEDURE — 97162 PT EVAL MOD COMPLEX 30 MIN: CPT | Performed by: PHYSICAL THERAPIST

## 2023-09-05 PROCEDURE — 97140 MANUAL THERAPY 1/> REGIONS: CPT | Performed by: PHYSICAL THERAPIST

## 2023-09-05 NOTE — TELEPHONE ENCOUNTER
Additional Information  • Negative: Is this related to a work injury? • Negative: Is this related to an MVA? • Negative: Are you currently recieving homecare services? • Negative: Has the patient had unexplained weight loss? • Negative: Does the patient have a fever? • Negative: Is the patient experiencing urine retention? • Negative: Is the patient experiencing acute drop foot or paralysis? • Negative: Has the patient experienced major trauma? (fall from height, high speed collision, direct blow to spine) and is also experiencing nausea, light-headedness, or loss of consciousness?     Protocols used: [facility] COMPREHENSIVE SPINE PROGRAM PROTOCOL

## 2023-09-05 NOTE — TELEPHONE ENCOUNTER
Additional Information  • Negative: Is this related to a work injury? • Negative: Is this related to an MVA? • Negative: Are you currently recieving homecare services? • Negative: Has the patient had unexplained weight loss? • Negative: Does the patient have a fever?     Protocols used: [facility] COMPREHENSIVE SPINE PROGRAM PROTOCOL

## 2023-09-05 NOTE — TELEPHONE ENCOUNTER
Additional Information  • Negative: Is this related to a work injury? • Negative: Is this related to an MVA? • Negative: Are you currently recieving homecare services? • Negative: Has the patient had unexplained weight loss? • Negative: Does the patient have a fever? • Negative: Is the patient experiencing urine retention?     Protocols used: [facility] COMPREHENSIVE SPINE PROGRAM PROTOCOL

## 2023-09-05 NOTE — TELEPHONE ENCOUNTER
Additional Information  • Negative: Is this related to a work injury? • Negative: Is this related to an MVA?     Protocols used: [facility] COMPREHENSIVE SPINE PROGRAM PROTOCOL

## 2023-09-05 NOTE — TELEPHONE ENCOUNTER
Additional Information  • Negative: Is this related to a work injury? • Negative: Is this related to an MVA? • Negative: Are you currently recieving homecare services? • Negative: Has the patient had unexplained weight loss? • Negative: Does the patient have a fever? • Negative: Is the patient experiencing urine retention? • Negative: Is the patient experiencing acute drop foot or paralysis? • Negative: Has the patient experienced major trauma? (fall from height, high speed collision, direct blow to spine) and is also experiencing nausea, light-headedness, or loss of consciousness? • Negative: Is the patient experiencing blood in sputum?     Protocols used: [facility] COMPREHENSIVE SPINE PROGRAM PROTOCOL

## 2023-09-05 NOTE — TELEPHONE ENCOUNTER
Additional Information  • Negative: Is this related to a work injury? • Negative: Is this related to an MVA? • Negative: Are you currently recieving homecare services? • Negative: Has the patient had unexplained weight loss? • Negative: Does the patient have a fever? • Negative: Is the patient experiencing urine retention? • Negative: Is the patient experiencing acute drop foot or paralysis?     Protocols used: [facility] COMPREHENSIVE SPINE PROGRAM PROTOCOL

## 2023-09-05 NOTE — TELEPHONE ENCOUNTER
Additional Information  • Negative: Is this related to a work injury? • Negative: Is this related to an MVA? • Negative: Are you currently recieving homecare services? • Negative: Has the patient had unexplained weight loss? • Negative: Does the patient have a fever? • Negative: Is the patient experiencing urine retention? • Negative: Is the patient experiencing acute drop foot or paralysis? • Negative: Has the patient experienced major trauma? (fall from height, high speed collision, direct blow to spine) and is also experiencing nausea, light-headedness, or loss of consciousness? • Negative: Is the patient experiencing blood in sputum? • Affirmative: Is this a chronic condition?     Protocols used: [facility] COMPREHENSIVE SPINE PROGRAM PROTOCOL

## 2023-09-05 NOTE — PROGRESS NOTES
PT Evaluation     Today's date: 2023  Patient name: Arsalan Birmingham  : 1950  MRN: [id number]  Referring provider: April Gonzalez, PT  Dx:   Encounter Diagnosis     ICD-10-CM    1. Chronic left-sided low back pain without sciatica  M54.50     G89.29                      Assessment/Plan  Patient is a very pleasant 70 yo female who presents with symptoms of acute on chronic lower back pain exacerbation without radiculopathy, secondary to hypomobility as well as advanced arthropathy. Patient is having a flare of symptoms which she reports is frequently made worse with pickleball. Patient responded well to initial mobilization of tight joints and was able to move more freely without pain and movement into extension. Patient demonstrated no deficits in strength testing and had no red flags during evaluation. Patient will benefit from skilled PT services to address issues of pain and return to higher level function with less pain. Patient is expected to progress well with treatment being seen 1-2x a week for 4 weeks. Subjective  Patient states that she has been having lower back pain over the past several weeks that seems to be worsening. She has been dealing with issues similar to this over the past several years however lately it has been worse and pickleball seems to make things much worse. She is not taking any medication for that currently and she is exercising 5 days a week. Patient would like to get rid of her symptoms and return to pickleball without pain and exercise for health. Patient also notes that she is having difficulty with standing greater than 5 minutes at a time. Objective  Red Flags: none  Pain: 4/10  Reflexes:2/5 BLE  Posture: loss of lumbar lordosis  AROM: limited in lumbar extension by 20% lateral flexion left with pain. Flexion discomfort in musculature  PROM:  deferred  PAROM: limited throughout lumbar and thoracic spine.   Decreased function and comparable sign with UPA of L4-S1 left  Palpation: WFL  Special Tests: no UMN signs, no neurological signs, sensation normal   Coordination of Movement/strengthe: Patient demonstrates poor activation of Transversus Abdominus and multifidus force couple and loss of feed forward mechanism with reaching tasks. Patient was able to perform activation with cueing. LE strength testing is 4+ and symmetrical B throughout.      Goals:  - Pt I with initial HEP in 1-2 visits  - return to pickleball without pain  - improved stabilizing structure activation and improved feed forward mechanism with distal activation  - Increase Functional Status Measure measured by FOTO by Raza Kay  - return to normal function and standing greater than 5 minutes  Precautions: none        Manuals                       L4-5 UPA right Gr.  Iv 10sec x5                     Percussion massage 10min                                                                     Neuro Re-Ed                                                                                                                                                                                               Ther Ex                       1/2 foam roller 3 min                                                                                                                                                                                             Ther Activity                                                                       Gait Training                                                                       Modalities                       Heat prone 10min

## 2023-09-06 ENCOUNTER — OFFICE VISIT (OUTPATIENT)
Dept: PHYSICAL THERAPY | Facility: MEDICAL CENTER | Age: 73
End: 2023-09-06
Payer: MEDICARE

## 2023-09-06 DIAGNOSIS — M54.50 CHRONIC LEFT-SIDED LOW BACK PAIN WITHOUT SCIATICA: Primary | ICD-10-CM

## 2023-09-06 DIAGNOSIS — G89.29 CHRONIC LEFT-SIDED LOW BACK PAIN WITHOUT SCIATICA: Primary | ICD-10-CM

## 2023-09-06 PROCEDURE — 97140 MANUAL THERAPY 1/> REGIONS: CPT

## 2023-09-06 PROCEDURE — 97112 NEUROMUSCULAR REEDUCATION: CPT

## 2023-09-06 PROCEDURE — 97110 THERAPEUTIC EXERCISES: CPT

## 2023-09-06 NOTE — PROGRESS NOTES
Daily Note     Today's date: 2023  Patient name: Evaristo Garcia  : 1950  MRN: [id number]  Referring provider: Angelina Aquino, PT  Dx:   Encounter Diagnosis     ICD-10-CM    1. Chronic left-sided low back pain without sciatica  M54.50     G89.29                      Subjective: Pt reports no changes since IE. Objective: See treatment diary below      Assessment: Tolerated treatment well. Patient demonstrated fatigue post treatment, exhibited good technique with therapeutic exercises and would benefit from continued PT  Pt was challenged with standing hip abduction ex's when stabilizing on her L LE. Issued hep and green TB this visit. Plan: Continue per plan of care.       Precautions: B TKA 10years ago           Daily Treatment Diary     Manual              STM KO            Percussion  massage KO                                                       Exercise Diary              Hip flexor str standing lunge 10sec x3            Hamstring str hip hinge 10sec x3            Start stepping             LTR 10x3                         Eccentric hamstring curl NP            Bridging with ball between knees 2x10            Clamshell with band Green  2x10            Standing hip abduction x10            Standing hip extension NP                                      Bike NP                                                                                                           Modalities                           Moist heat 10'  Pre tx

## 2023-09-13 ENCOUNTER — OFFICE VISIT (OUTPATIENT)
Dept: PHYSICAL THERAPY | Facility: MEDICAL CENTER | Age: 73
End: 2023-09-13
Payer: MEDICARE

## 2023-09-13 DIAGNOSIS — M54.50 CHRONIC LEFT-SIDED LOW BACK PAIN WITHOUT SCIATICA: Primary | ICD-10-CM

## 2023-09-13 DIAGNOSIS — G89.29 CHRONIC LEFT-SIDED LOW BACK PAIN WITHOUT SCIATICA: Primary | ICD-10-CM

## 2023-09-13 PROCEDURE — 97110 THERAPEUTIC EXERCISES: CPT | Performed by: PHYSICAL THERAPIST

## 2023-09-13 PROCEDURE — 97140 MANUAL THERAPY 1/> REGIONS: CPT | Performed by: PHYSICAL THERAPIST

## 2023-09-13 NOTE — PROGRESS NOTES
Daily Note     Today's date: 2023  Patient name: Kaylee Roberson  : 1950  MRN: [id number]  Referring provider: Matt Garcia, PT  Dx:   Encounter Diagnosis     ICD-10-CM    1. Chronic left-sided low back pain without sciatica  M54.50     G89.29                      Subjective: Pt reports no changes since IE. Objective: See treatment diary below      Assessment: Tolerated treatment well. Patient demonstrated fatigue post treatment, exhibited good technique with therapeutic exercises and would benefit from continued PT  Pt was challenged with standing hip abduction ex's when stabilizing on her L LE. Issued hep and green TB this visit. Plan: Continue per plan of care. Precautions: B TKA 10years ago           Daily Treatment Diary     Manual              STM gs            UPA L2-5 left  Gr.  Iv 10sec x5                                                       Exercise Diary              Hip flexor str standing lunge 10sec x3            Hamstring str hip hinge 10sec x3            Start stepping             LTR 10x3                         Eccentric hamstring curl             Bridging with ball between knees 2x10            Clamshell with band Green  2x10            Standing hip abduction 2x15            Standing hip extension 2x15                                      Bike NP                                                                                                           Modalities                           Moist heat 10'  Pre tx

## 2023-09-15 ENCOUNTER — OFFICE VISIT (OUTPATIENT)
Dept: PHYSICAL THERAPY | Facility: MEDICAL CENTER | Age: 73
End: 2023-09-15
Payer: MEDICARE

## 2023-09-15 DIAGNOSIS — M54.50 CHRONIC LEFT-SIDED LOW BACK PAIN WITHOUT SCIATICA: Primary | ICD-10-CM

## 2023-09-15 DIAGNOSIS — G89.29 CHRONIC LEFT-SIDED LOW BACK PAIN WITHOUT SCIATICA: Primary | ICD-10-CM

## 2023-09-15 LAB — HBA1C MFR BLD HPLC: 5.9 %

## 2023-09-15 PROCEDURE — 97140 MANUAL THERAPY 1/> REGIONS: CPT | Performed by: PHYSICAL THERAPIST

## 2023-09-15 PROCEDURE — 97110 THERAPEUTIC EXERCISES: CPT | Performed by: PHYSICAL THERAPIST

## 2023-09-15 NOTE — PROGRESS NOTES
Daily Note     Today's date: 9/15/2023  Patient name: Kaylin Reynolds  : 1950  MRN: [id number]  Referring provider: Dora Lozano, PT  Dx:   Encounter Diagnosis     ICD-10-CM    1. Chronic left-sided low back pain without sciatica  M54.50     G89.29                      Subjective: Pt reports feeling good overall but having some muscle soreness. Objective: See treatment diary below      Assessment: Tolerated treatment well. Patient demonstrated fatigue post treatment, exhibited good technique with therapeutic exercises and would benefit from continued PT  Pt was challenged with standing hip abduction ex's when stabilizing on her L LE. Issued hep and green TB this visit. Plan: Continue per plan of care. Precautions: B TKA 10years ago           Daily Treatment Diary     Manual              STM gs            UPA L2-5 left  Gr.  Iv 10sec x5                                                       Exercise Diary              Hip flexor str standing lunge 10sec x3            Hamstring str hip hinge 10sec x3            Start stepping             LTR 10x3                         Eccentric hamstring curl             Bridging with ball between knees 2x10            Clamshell with band Green  2x10            Standing hip abduction 2x15            Standing hip extension 2x15                                      Bike NP                                                                                                           Modalities                           Moist heat 10'  Pre tx

## 2023-09-20 ENCOUNTER — OFFICE VISIT (OUTPATIENT)
Dept: PHYSICAL THERAPY | Facility: MEDICAL CENTER | Age: 73
End: 2023-09-20
Payer: MEDICARE

## 2023-09-20 DIAGNOSIS — G89.29 CHRONIC LEFT-SIDED LOW BACK PAIN WITHOUT SCIATICA: Primary | ICD-10-CM

## 2023-09-20 DIAGNOSIS — M54.50 CHRONIC LEFT-SIDED LOW BACK PAIN WITHOUT SCIATICA: Primary | ICD-10-CM

## 2023-09-20 PROCEDURE — 97110 THERAPEUTIC EXERCISES: CPT | Performed by: PHYSICAL THERAPIST

## 2023-09-20 PROCEDURE — 97140 MANUAL THERAPY 1/> REGIONS: CPT | Performed by: PHYSICAL THERAPIST

## 2023-09-20 NOTE — PROGRESS NOTES
Daily Note     Today's date: 2023  Patient name: Arsalan Birmingham  : 1950  MRN: [id number]  Referring provider: April Gonzalez, PT  Dx:   Encounter Diagnosis     ICD-10-CM    1. Chronic left-sided low back pain without sciatica  M54.50     G89.29                      Subjective: Pt reports feeling good overall but having some muscle soreness. Able to play pickleball this week without much pain following. Objective: See treatment diary below      Assessment: Tolerated treatment well. Patient demonstrated fatigue post treatment, exhibited good technique with therapeutic exercises and would benefit from continued PT     Plan: Continue per plan of care. Precautions: B TKA 10years ago           Daily Treatment Diary     Manual              STM gs            UPA L2-5 left  Gr.  Iv 10sec x5                                                       Exercise Diary              Hip flexor str standing lunge 10sec x3            Hamstring str hip hinge 10sec x3            Start stepping             LTR 10x3                         Eccentric hamstring curl             Bridging with ball between knees 2x10            Clamshell with band Green  2x10            Standing hip abduction 2x15            Standing hip extension 2x15                                      Bike NP                                                                                                           Modalities                           Moist heat

## 2023-09-22 ENCOUNTER — OFFICE VISIT (OUTPATIENT)
Dept: PHYSICAL THERAPY | Facility: MEDICAL CENTER | Age: 73
End: 2023-09-22
Payer: MEDICARE

## 2023-09-22 DIAGNOSIS — M54.50 CHRONIC LEFT-SIDED LOW BACK PAIN WITHOUT SCIATICA: Primary | ICD-10-CM

## 2023-09-22 DIAGNOSIS — G89.29 CHRONIC LEFT-SIDED LOW BACK PAIN WITHOUT SCIATICA: Primary | ICD-10-CM

## 2023-09-22 PROCEDURE — 97110 THERAPEUTIC EXERCISES: CPT | Performed by: PHYSICAL THERAPIST

## 2023-09-22 PROCEDURE — 97140 MANUAL THERAPY 1/> REGIONS: CPT | Performed by: PHYSICAL THERAPIST

## 2023-09-22 NOTE — PROGRESS NOTES
Daily Note     Today's date: 2023  Patient name: Noma Lesch  : 1950  MRN: [id number]  Referring provider: Mavis Chowdhury, PT  Dx:   Encounter Diagnosis     ICD-10-CM    1. Chronic left-sided low back pain without sciatica  M54.50     G89.29                      Subjective: Pt reports feeling good overall but having some muscle soreness. Able to play pickleball this week without much pain following. Objective: See treatment diary below      Assessment: Tolerated treatment well. Patient demonstrated fatigue post treatment, exhibited good technique with therapeutic exercises and would benefit from continued PT     Plan: Continue per plan of care. Precautions: B TKA 10years ago           Daily Treatment Diary     Manual              STM gs            UPA L2-5 left  Gr.  Iv 10sec x5                                                       Exercise Diary              Hip flexor str standing lunge 10sec x3            Hamstring str hip hinge 10sec x3            Start stepping             LTR 10x3                         Eccentric hamstring curl             Bridging with ball between knees 2x10            Clamshell with band Green  2x10            Standing hip abduction 2x15            Standing hip extension 2x15                                      Bike NP                                                                                                           Modalities                           Moist heat

## 2023-09-25 ENCOUNTER — OFFICE VISIT (OUTPATIENT)
Dept: PHYSICAL THERAPY | Facility: MEDICAL CENTER | Age: 73
End: 2023-09-25
Payer: MEDICARE

## 2023-09-25 DIAGNOSIS — G89.29 CHRONIC LEFT-SIDED LOW BACK PAIN WITHOUT SCIATICA: Primary | ICD-10-CM

## 2023-09-25 DIAGNOSIS — M54.50 CHRONIC LEFT-SIDED LOW BACK PAIN WITHOUT SCIATICA: Primary | ICD-10-CM

## 2023-09-25 PROCEDURE — 97110 THERAPEUTIC EXERCISES: CPT | Performed by: PHYSICAL THERAPIST

## 2023-09-25 PROCEDURE — 97140 MANUAL THERAPY 1/> REGIONS: CPT | Performed by: PHYSICAL THERAPIST

## 2023-09-25 NOTE — PROGRESS NOTES
Daily Note     Today's date: 2023  Patient name: Kaylin Reynolds  : 1950  MRN: [id number]  Referring provider: Dora Lozano, PT  Dx:   Encounter Diagnosis     ICD-10-CM    1. Chronic left-sided low back pain without sciatica  M54.50     G89.29                      Subjective: Pt reports feeling good overall but having some muscle soreness. Objective: See treatment diary below      Assessment: Tolerated treatment well. Patient demonstrated fatigue post treatment, exhibited good technique with therapeutic exercises and would benefit from continued PT. Making good gains with her strength and mobility. Plan: Continue per plan of care. Precautions: B TKA 10years ago           Daily Treatment Diary     Manual              STM gs            UPA L2-5 left  Gr.  Iv 10sec x5                                                       Exercise Diary              Hip flexor str standing lunge 10sec x3            Hamstring str hip hinge 10sec x3            Start stepping             LTR 10x3                         Eccentric hamstring curl             Bridging with ball between knees 2x10            Clamshell with band Green  2x10            Standing hip abduction 2x15            Standing hip extension 2x15                                      Bike NP                                                                                                           Modalities                           Moist heat

## 2023-09-27 ENCOUNTER — APPOINTMENT (OUTPATIENT)
Dept: PHYSICAL THERAPY | Facility: MEDICAL CENTER | Age: 73
End: 2023-09-27
Payer: MEDICARE

## 2023-09-28 ENCOUNTER — APPOINTMENT (OUTPATIENT)
Dept: PHYSICAL THERAPY | Facility: MEDICAL CENTER | Age: 73
End: 2023-09-28
Payer: MEDICARE

## 2023-09-29 ENCOUNTER — APPOINTMENT (OUTPATIENT)
Dept: PHYSICAL THERAPY | Facility: MEDICAL CENTER | Age: 73
End: 2023-09-29
Payer: MEDICARE

## 2023-10-03 ENCOUNTER — OFFICE VISIT (OUTPATIENT)
Dept: FAMILY MEDICINE CLINIC | Facility: CLINIC | Age: 73
End: 2023-10-03
Payer: MEDICARE

## 2023-10-03 VITALS
WEIGHT: 193.2 LBS | HEART RATE: 73 BPM | OXYGEN SATURATION: 96 % | TEMPERATURE: 98.5 F | DIASTOLIC BLOOD PRESSURE: 84 MMHG | SYSTOLIC BLOOD PRESSURE: 138 MMHG | BODY MASS INDEX: 30.32 KG/M2

## 2023-10-03 DIAGNOSIS — I10 PRIMARY HYPERTENSION: ICD-10-CM

## 2023-10-03 DIAGNOSIS — Z23 ENCOUNTER FOR IMMUNIZATION: ICD-10-CM

## 2023-10-03 DIAGNOSIS — Z13.0 SCREENING FOR DEFICIENCY ANEMIA: ICD-10-CM

## 2023-10-03 DIAGNOSIS — R73.09 ELEVATED GLUCOSE: Primary | ICD-10-CM

## 2023-10-03 PROCEDURE — 99213 OFFICE O/P EST LOW 20 MIN: CPT | Performed by: FAMILY MEDICINE

## 2023-10-03 PROCEDURE — G0008 ADMIN INFLUENZA VIRUS VAC: HCPCS | Performed by: FAMILY MEDICINE

## 2023-10-03 PROCEDURE — 90662 IIV NO PRSV INCREASED AG IM: CPT | Performed by: FAMILY MEDICINE

## 2023-10-03 NOTE — PROGRESS NOTES
Name: Natalie Newell      : 1950      MRN: [id number]  Encounter Provider: Bam Pyle DO  Encounter Date: 10/3/2023   Encounter department: [address] [place] [address]     1. Elevated glucose  Assessment & Plan:  Hemoglobin A1c remains stable at 5.9. Continue regular exercise. Continue to monitor carb intake. Orders:  -     Hemoglobin A1C; Future; Expected date: 03/15/2024    2. Primary hypertension  Assessment & Plan:  Blood pressure well controlled on lisinopril hydrochlorothiazide 20/12.5. Orders:  -     Comprehensive metabolic panel; Future; Expected date: 03/15/2024  -     TSH, 3rd generation with Free T4 reflex; Future; Expected date: 03/15/2024    3. Screening for deficiency anemia  -     CBC and differential; Future; Expected date: 03/15/2024    4. Encounter for immunization  -     influenza vaccine, high-dose, PF 0.7 mL (FLUZONE HIGH-DOSE)           Subjective      Patient was seen for follow-up of chronic medical problems. Primary issue is hypertension along with elevated glucose. She has no complaints. She remains active exercising regularly. Review of Systems   Constitutional: Negative. Respiratory: Negative. Cardiovascular: Negative. Gastrointestinal: Negative. Genitourinary: Negative. Musculoskeletal: Negative. Psychiatric/Behavioral: Negative.         Current Outpatient Medications on File Prior to Visit   Medication Sig   • Cholecalciferol (VITAMIN D3) 2000 units capsule Take 1 tablet by mouth daily   • lisinopril-hydrochlorothiazide (PRINZIDE,ZESTORETIC) 20-12.5 MG per tablet TAKE 1 TABLET BY MOUTH DAILY   • [DISCONTINUED] meloxicam (MOBIC) 15 mg tablet TAKE 1 TABLET(15 MG) BY MOUTH DAILY (Patient not taking: Reported on 10/3/2023)   • [DISCONTINUED] methocarbamol (ROBAXIN) 500 mg tablet Take 1 tablet (500 mg total) by mouth 4 (four) times a day (Patient not taking: Reported on 2023)   • [DISCONTINUED] NON FORMULARY Take 150 mg by mouth daily Progesterone (Patient not taking: Reported on 8/15/2022)       Objective     /84 (BP Location: Right arm, Patient Position: Sitting, Cuff Size: Large)   Pulse 73   Temp 98.5 °F (36.9 °C)   Wt 87.6 kg (193 lb 3.2 oz)   SpO2 96%   BMI 30.32 kg/m²     Physical Exam  Vitals and nursing note reviewed. Constitutional:       General: She is not in acute distress. Appearance: She is well-developed. She is not diaphoretic. HENT:      Head: Normocephalic and atraumatic. Eyes:      General:         Right eye: No discharge. Conjunctiva/sclera: Conjunctivae normal.      Pupils: Pupils are equal, round, and reactive to light. Neck:      Thyroid: No thyromegaly. Cardiovascular:      Rate and Rhythm: Normal rate and regular rhythm. Pulmonary:      Effort: Pulmonary effort is normal. No respiratory distress. Breath sounds: Normal breath sounds. Musculoskeletal:      Cervical back: Normal range of motion. Lymphadenopathy:      Cervical: No cervical adenopathy. Skin:     General: Skin is warm and dry. Neurological:      Mental Status: She is alert and oriented to person, place, and time. Psychiatric:         Behavior: Behavior normal.         Thought Content:  Thought content normal.         Judgment: Judgment normal.       Bethel Mcgowan,

## 2023-10-04 ENCOUNTER — OFFICE VISIT (OUTPATIENT)
Dept: PHYSICAL THERAPY | Facility: MEDICAL CENTER | Age: 73
End: 2023-10-04
Payer: MEDICARE

## 2023-10-04 DIAGNOSIS — M54.50 CHRONIC LEFT-SIDED LOW BACK PAIN WITHOUT SCIATICA: Primary | ICD-10-CM

## 2023-10-04 DIAGNOSIS — G89.29 CHRONIC LEFT-SIDED LOW BACK PAIN WITHOUT SCIATICA: Primary | ICD-10-CM

## 2023-10-04 PROCEDURE — 97110 THERAPEUTIC EXERCISES: CPT | Performed by: PHYSICAL THERAPIST

## 2023-10-04 PROCEDURE — 97140 MANUAL THERAPY 1/> REGIONS: CPT | Performed by: PHYSICAL THERAPIST

## 2023-10-04 NOTE — PROGRESS NOTES
Daily Note     Today's date: 10/4/2023  Patient name: Katie Villagran  : 1950  MRN: [id number]  Referring provider: Jorge Alberto Norman PT  Dx:   Encounter Diagnosis     ICD-10-CM    1. Chronic left-sided low back pain without sciatica  M54.50     G89.29                      Subjective: Pt reports feeling good overall but having some muscle soreness. Objective: See treatment diary below      Assessment: Tolerated treatment well. Patient demonstrated fatigue post treatment, exhibited good technique with therapeutic exercises and would benefit from continued PT. Making good gains with her strength and mobility. Plan: Continue per plan of care. Precautions: B TKA 10years ago           Daily Treatment Diary     Manual              STM gs            UPA L2-5 left  Gr.  Iv 10sec x5                                                       Exercise Diary              Hip flexor str standing lunge 10sec x3            Hamstring str hip hinge 10sec x3            Start stepping             LTR 10x3                         Eccentric hamstring curl             Bridging with ball between knees 2x10            Clamshell with band Green  2x10            Standing hip abduction 2x15            Standing hip extension 2x15                                      Bike NP                                                                                                           Modalities                           Moist heat

## 2023-10-16 ENCOUNTER — APPOINTMENT (OUTPATIENT)
Dept: LAB | Facility: CLINIC | Age: 73
End: 2023-10-16
Payer: MEDICARE

## 2023-10-16 ENCOUNTER — NURSE TRIAGE (OUTPATIENT)
Age: 73
End: 2023-10-16

## 2023-10-16 DIAGNOSIS — R39.9 UTI SYMPTOMS: ICD-10-CM

## 2023-10-16 DIAGNOSIS — R39.9 UTI SYMPTOMS: Primary | ICD-10-CM

## 2023-10-16 LAB
BACTERIA UR QL AUTO: ABNORMAL /HPF
BILIRUB UR QL STRIP: NEGATIVE
CLARITY UR: CLEAR
COLOR UR: COLORLESS
GLUCOSE UR STRIP-MCNC: NEGATIVE MG/DL
HGB UR QL STRIP.AUTO: ABNORMAL
KETONES UR STRIP-MCNC: NEGATIVE MG/DL
LEUKOCYTE ESTERASE UR QL STRIP: ABNORMAL
NITRITE UR QL STRIP: NEGATIVE
NON-SQ EPI CELLS URNS QL MICRO: ABNORMAL /HPF
PH UR STRIP.AUTO: 6 [PH]
PROT UR STRIP-MCNC: NEGATIVE MG/DL
RBC #/AREA URNS AUTO: ABNORMAL /HPF
SP GR UR STRIP.AUTO: 1.01 (ref 1–1.03)
UROBILINOGEN UR STRIP-ACNC: <2 MG/DL
WBC #/AREA URNS AUTO: ABNORMAL /HPF

## 2023-10-16 PROCEDURE — 87186 SC STD MICRODIL/AGAR DIL: CPT

## 2023-10-16 PROCEDURE — 81001 URINALYSIS AUTO W/SCOPE: CPT

## 2023-10-16 PROCEDURE — 87086 URINE CULTURE/COLONY COUNT: CPT

## 2023-10-16 PROCEDURE — 87077 CULTURE AEROBIC IDENTIFY: CPT

## 2023-10-16 NOTE — TELEPHONE ENCOUNTER
Awaiting results of culture and sensitivity.   In the meantime patient can take Azo over-the-counter and for management of dysuria until I get a finalized sensitivity so we can ensure initiation of appropriate antibiotic

## 2023-10-16 NOTE — TELEPHONE ENCOUNTER
Call placed to patient and spoke with her. Informed her of the CRNP recommendations. Pt has tried Azo in the past with no relief. Explained to patient that results can take 48 hours to finalize and we will notify her once final results are received. Pt is aware and will contact the office if symptoms worsen in the meantime. ,

## 2023-10-16 NOTE — TELEPHONE ENCOUNTER
Regarding: UTI  ----- Message from Laurel Mark sent at 10/16/2023  8:03 AM EDT -----  Established patient calling with urinary frequency,pressure and burning please contact her with recommendation.

## 2023-10-16 NOTE — TELEPHONE ENCOUNTER
Pt called reports started with UTI symptoms last night. States had one about 3 weeks ago and symptoms now back. Said at last visit did discuss Low dose antibiotic everyday if continued with infections. . Urgency, frequency, burning and pressure. Drinking lots water. Denies fever, chills, blood in urine. Orders placed forr urine testing. Pt is requesting to start something today due to her extreme discomfort.  Please advise   Reason for Disposition  • Urinating more frequently than usual (i.e., frequency)    Protocols used: Urinary Symptoms-ADULT-[place]

## 2023-10-18 ENCOUNTER — TELEPHONE (OUTPATIENT)
Dept: UROLOGY | Facility: AMBULATORY SURGERY CENTER | Age: 73
End: 2023-10-18

## 2023-10-18 DIAGNOSIS — N39.0 FREQUENT UTI: Primary | ICD-10-CM

## 2023-10-18 LAB
BACTERIA UR CULT: ABNORMAL
BACTERIA UR CULT: ABNORMAL

## 2023-10-18 RX ORDER — CEFUROXIME AXETIL 500 MG/1
500 TABLET ORAL EVERY 12 HOURS SCHEDULED
Qty: 14 TABLET | Refills: 0 | Status: SHIPPED | OUTPATIENT
Start: 2023-10-18 | End: 2023-10-25

## 2023-10-18 NOTE — TELEPHONE ENCOUNTER
PER SHELBY, Ceftin was called into the pharmacy. Call placed to patient and spoke with her. Informed her of script that was sent. She will take as directed.

## 2023-10-18 NOTE — TELEPHONE ENCOUNTER
Urine culture  Order: [id number]  Status: Preliminary result       Visible to patient: No (not released)       Next appt: 04/09/2024 at 09:30 AM in Family [facility] Tigre Schwartz DO)       Dx: UTI symptoms    Specimen Information: Urine, Clean Catch   0 Result Notes  Urine Culture Culture results to follow. Urine culture is still in process.

## 2023-10-18 NOTE — TELEPHONE ENCOUNTER
Pt is under the care of: cecilio    Pt was last seen: 04/26/23    Pt had test done on: 10/16/23    Pt would like to know test results     Pt can be reached at: [phone]

## 2023-11-03 ENCOUNTER — APPOINTMENT (OUTPATIENT)
Dept: LAB | Facility: CLINIC | Age: 73
End: 2023-11-03
Payer: MEDICARE

## 2023-11-03 ENCOUNTER — NURSE TRIAGE (OUTPATIENT)
Age: 73
End: 2023-11-03

## 2023-11-03 DIAGNOSIS — R39.9 UTI SYMPTOMS: ICD-10-CM

## 2023-11-03 DIAGNOSIS — R39.9 UTI SYMPTOMS: Primary | ICD-10-CM

## 2023-11-03 LAB
BACTERIA UR QL AUTO: ABNORMAL /HPF
BILIRUB UR QL STRIP: NEGATIVE
CLARITY UR: ABNORMAL
COLOR UR: COLORLESS
GLUCOSE UR STRIP-MCNC: NEGATIVE MG/DL
HGB UR QL STRIP.AUTO: NEGATIVE
KETONES UR STRIP-MCNC: NEGATIVE MG/DL
LEUKOCYTE ESTERASE UR QL STRIP: ABNORMAL
NITRITE UR QL STRIP: NEGATIVE
NON-SQ EPI CELLS URNS QL MICRO: ABNORMAL /HPF
PH UR STRIP.AUTO: 6 [PH]
PROT UR STRIP-MCNC: NEGATIVE MG/DL
RBC #/AREA URNS AUTO: ABNORMAL /HPF
SP GR UR STRIP.AUTO: 1.01 (ref 1–1.03)
UROBILINOGEN UR STRIP-ACNC: <2 MG/DL
WBC #/AREA URNS AUTO: ABNORMAL /HPF

## 2023-11-03 PROCEDURE — 87086 URINE CULTURE/COLONY COUNT: CPT

## 2023-11-03 PROCEDURE — 87077 CULTURE AEROBIC IDENTIFY: CPT

## 2023-11-03 PROCEDURE — 87186 SC STD MICRODIL/AGAR DIL: CPT

## 2023-11-03 PROCEDURE — 81001 URINALYSIS AUTO W/SCOPE: CPT

## 2023-11-03 NOTE — TELEPHONE ENCOUNTER
Regarding: Possible UTI  ----- Message from Hernán White sent at 11/3/2023 10:12 AM EDT -----  UTI symptoms, foul smelling urine, frequent urination, frequent urge to go even if she just went 110

## 2023-11-03 NOTE — TELEPHONE ENCOUNTER
Had UTI 3 weeks ago. Feels it did clear after course of Antibiotics. Patient woke up this morning with urinary frequency , foul smelling urine. Denies fever, chills, burning. Sent patient to have urine testing done. Reason for Disposition   Bad or foul-smelling urine    Answer Assessment - Initial Assessment Questions  1. SYMPTOM: "What's the main symptom you're concerned about?" (e.g., frequency, incontinence)      Urinary frequency     2. ONSET: "When did it start?"   This morning      3. OTHER SYMPTOMS: "Do you have any other symptoms?" (e.g., fever, flank pain, blood in urine, pain with urination)        Foul smelling urine. Denies Fever, chills, burning.     Protocols used: Urinary Symptoms-ADULT-[place]

## 2023-11-05 LAB
BACTERIA UR CULT: ABNORMAL
BACTERIA UR CULT: ABNORMAL

## 2023-11-06 ENCOUNTER — OFFICE VISIT (OUTPATIENT)
Dept: FAMILY MEDICINE CLINIC | Facility: CLINIC | Age: 73
End: 2023-11-06
Payer: MEDICARE

## 2023-11-06 VITALS
BODY MASS INDEX: 30.51 KG/M2 | OXYGEN SATURATION: 97 % | HEART RATE: 83 BPM | TEMPERATURE: 98.7 F | WEIGHT: 194.4 LBS | DIASTOLIC BLOOD PRESSURE: 96 MMHG | SYSTOLIC BLOOD PRESSURE: 152 MMHG

## 2023-11-06 DIAGNOSIS — N39.0 RECURRENT UTI: Primary | ICD-10-CM

## 2023-11-06 PROCEDURE — 99213 OFFICE O/P EST LOW 20 MIN: CPT | Performed by: FAMILY MEDICINE

## 2023-11-06 RX ORDER — SULFAMETHOXAZOLE AND TRIMETHOPRIM 800; 160 MG/1; MG/1
1 TABLET ORAL 2 TIMES DAILY
Qty: 14 TABLET | Refills: 0 | Status: SHIPPED | OUTPATIENT
Start: 2023-11-06 | End: 2023-11-13

## 2023-11-06 RX ORDER — NITROFURANTOIN MACROCRYSTALS 50 MG/1
50 CAPSULE ORAL DAILY
Qty: 30 CAPSULE | Refills: 1 | Status: SHIPPED | OUTPATIENT
Start: 2023-11-06

## 2023-11-06 NOTE — PROGRESS NOTES
Name: Noma Lesch      : 1950      MRN: [id number]  Encounter Provider: Aura Schirmer, DO  Encounter Date: 2023   Encounter department: [address] [place] [address]. Recurrent UTI  -     nitrofurantoin (MACRODANTIN) 50 mg capsule; Take 1 capsule (50 mg total) by mouth in the morning    Patient will start Bactrim as prescribed by her urologist today. She will take for 7 days. At the end of that I have prescribed her suppressive Macrodantin, 50 mg daily to take for 2 months. Depression Screening and Follow-up Plan: Patient was screened for depression during today's encounter. They screened negative with a PHQ-2 score of 0. Subjective      Patient presents to follow-up on recurrent UTIs. Has been seeing urology. Has had a series of multiple urinary tract infections proven by culture and placed on antibiotics only to have another infection within several weeks. Most recent symptoms began 3 days ago. Culture returned today and urology did put her on antibiotics. She had discussed with previous physician assistant at urology about taking suppressive therapy but she does not have an appointment with urology until February. Review of Systems   Genitourinary:  Positive for dysuria and frequency. Current Outpatient Medications on File Prior to Visit   Medication Sig   • Cholecalciferol (VITAMIN D3) 2000 units capsule Take 1 tablet by mouth daily   • lisinopril-hydrochlorothiazide (PRINZIDE,ZESTORETIC) 20-12.5 MG per tablet TAKE 1 TABLET BY MOUTH DAILY   • sulfamethoxazole-trimethoprim (BACTRIM DS) 800-160 mg per tablet Take 1 tablet by mouth 2 (two) times a day for 7 days       Objective     /96 (BP Location: Left arm, Patient Position: Sitting, Cuff Size: Adult)   Pulse 83   Temp 98.7 °F (37.1 °C)   Wt 88.2 kg (194 lb 6.4 oz)   SpO2 97%   BMI 30.51 kg/m²     Physical Exam  Constitutional:       Appearance: She is well-developed. HENT:      Head: Normocephalic and atraumatic. Eyes:      Pupils: Pupils are equal, round, and reactive to light. Pulmonary:      Effort: Pulmonary effort is normal. No respiratory distress. Musculoskeletal:      Cervical back: Normal range of motion. Neurological:      Mental Status: She is alert and oriented to person, place, and time. Psychiatric:         Behavior: Behavior normal.         Thought Content:  Thought content normal.         Judgment: Judgment normal.       Chelsey Alexander,

## 2023-11-06 NOTE — TELEPHONE ENCOUNTER
Called patient - she is told of the infection and will start the antibiotics. She had no further questions or concerns.

## 2024-01-08 ENCOUNTER — OFFICE VISIT (OUTPATIENT)
Dept: PHYSICAL THERAPY | Facility: MEDICAL CENTER | Age: 74
End: 2024-01-08
Payer: MEDICARE

## 2024-01-08 DIAGNOSIS — M25.551 RIGHT HIP PAIN: Primary | ICD-10-CM

## 2024-01-08 PROCEDURE — 97161 PT EVAL LOW COMPLEX 20 MIN: CPT | Performed by: PHYSICAL THERAPIST

## 2024-01-08 NOTE — PROGRESS NOTES
PT Evaluation     Today's date: 2024  Patient name: Pilar Laurent  : 1950  MRN: [id number]  Referring provider: Suleman Da Silva, PT  Dx:   Encounter Diagnosis     ICD-10-CM    1. Right hip pain  M25.551                      Assessment/Plan  Patient is a very pleasant 72 yo female who presents today with a 5 week history of right hip pain.  Patient's symptoms are consistent with hip flexor tendonitis due to overuse injury.  Patient has palpable pain over ASIS and along hip flexor and quad insertions.  Patient will benefit from skilled PT services to address her pain and return her to higher level of function and exercise.  Patient was instructed on RICE and was responsive to initial treatment and information presented.  Patient should be seen 2x a week for 4 weeks.     Subjective  Patient states that she started having anterior hip pain following excessive walking with her friend 5-6 weeks ago.  She is active and walks daily as well as playing pickleball several days a week.  Patient is currently having issues with walking greater than 10 minutes and Pickleball is limited to 1 game.  Patient would like to return to normal function and exercise.      Objective  Red Flags: none  Pain: 3-7/10  Reflexes:     Right Left   Biceps 2+ 2+   Triceps 2+ 2+   Brachioradialis 2+ 2+   Patellar 2+ 2+   Achilles 2+ 2+   Inverted supinator sign neg neg   clonus neg neg   Babinski neg neg        Posture: WFL  AROM: limited in right hip extension due to pain and tension of anterior pelvis  PROM: full with pain at end rom of right hip extension  PAROM: WFL  Palpation: severe pain over right pelvis ASIS and along quad and hip flexor tendons  Special Tests: hip joint cleared, pelvis cleared  Coordination of Movement/strengthe: guarded movement with all right leg due to pain.  Strength does not seem to be limited.   Goals:  - Pt I with initial HEP in 1-2 visits  - Improve ambulation to full without limitations from right hip  pain  - improved stabilizing structure activation and improved feed forward mechanism with distal activation  - Increase Functional Status Measure measured by FOTO by [facility]  - return to pickleball as desired         Precautions: none

## 2024-01-11 ENCOUNTER — OFFICE VISIT (OUTPATIENT)
Dept: PHYSICAL THERAPY | Facility: MEDICAL CENTER | Age: 74
End: 2024-01-11
Payer: MEDICARE

## 2024-01-11 DIAGNOSIS — M25.551 RIGHT HIP PAIN: Primary | ICD-10-CM

## 2024-01-11 PROCEDURE — 97110 THERAPEUTIC EXERCISES: CPT | Performed by: PHYSICAL THERAPIST

## 2024-01-11 PROCEDURE — 97140 MANUAL THERAPY 1/> REGIONS: CPT | Performed by: PHYSICAL THERAPIST

## 2024-01-11 NOTE — PROGRESS NOTES
Daily Note     Today's date: 2024  Patient name: Pilar Laurent  : 1950  MRN: [id number]  Referring provider: Suleman Da Silva, JOSE FRANCISCO  Dx:   Encounter Diagnosis     ICD-10-CM    1. Right hip pain  M25.551                      Subjective: patient states that she has been icing and working on her exercise but her symptoms are still there.        Objective: See treatment diary below      Assessment: Tolerated treatment well. Patient exhibited good technique with therapeutic exercises and would benefit from continued PT      Plan: Continue per plan of care.      Precautions: none           Manuals             Hip flexor stretching  10sec x5            Quad stretch 10sec x5            roller 10min            Percussion SMT 5min            Neuro Re-Ed                                                                                                        Ther Ex             Standing hip flexor stretch 10sec x3            IT band stretching  10sec x3                                                                                          Ther Activity                                       Gait Training                                       Modalities

## 2024-01-16 ENCOUNTER — APPOINTMENT (OUTPATIENT)
Dept: PHYSICAL THERAPY | Facility: MEDICAL CENTER | Age: 74
End: 2024-01-16
Payer: MEDICARE

## 2024-01-17 ENCOUNTER — OFFICE VISIT (OUTPATIENT)
Dept: PHYSICAL THERAPY | Facility: MEDICAL CENTER | Age: 74
End: 2024-01-17
Payer: MEDICARE

## 2024-01-17 DIAGNOSIS — M25.551 RIGHT HIP PAIN: Primary | ICD-10-CM

## 2024-01-17 PROCEDURE — 97110 THERAPEUTIC EXERCISES: CPT | Performed by: PHYSICAL THERAPIST

## 2024-01-17 PROCEDURE — 97140 MANUAL THERAPY 1/> REGIONS: CPT | Performed by: PHYSICAL THERAPIST

## 2024-01-17 NOTE — PROGRESS NOTES
Daily Note     Today's date: 2024  Patient name: Pilar Laurent  : 1950  MRN: [id number]  Referring provider: Suleman Da Silva, JOSE FRANCISCO  Dx:   Encounter Diagnosis     ICD-10-CM    1. Right hip pain  M25.551                      Subjective: patient states that she has been icing and working on her exercise but her symptoms are still there.        Objective: See treatment diary below      Assessment: Tolerated treatment well. Patient exhibited good technique with therapeutic exercises and would benefit from continued PT      Plan: Continue per plan of care.      Precautions: none           Manuals             Hip flexor stretching  10sec x5            Quad stretch 10sec x5            roller 10min            Percussion SMT 5min            Neuro Re-Ed                                                                                                        Ther Ex             Standing hip flexor stretch 10sec x3            IT band stretching  10sec x3                                                                                          Ther Activity                                       Gait Training                                       Modalities

## 2024-01-18 DIAGNOSIS — I10 ESSENTIAL HYPERTENSION: ICD-10-CM

## 2024-01-18 RX ORDER — LISINOPRIL AND HYDROCHLOROTHIAZIDE 20; 12.5 MG/1; MG/1
1 TABLET ORAL DAILY
Qty: 90 TABLET | Refills: 1 | Status: SHIPPED | OUTPATIENT
Start: 2024-01-18

## 2024-01-19 ENCOUNTER — APPOINTMENT (OUTPATIENT)
Dept: PHYSICAL THERAPY | Facility: MEDICAL CENTER | Age: 74
End: 2024-01-19
Payer: MEDICARE

## 2024-01-23 ENCOUNTER — OFFICE VISIT (OUTPATIENT)
Dept: PHYSICAL THERAPY | Facility: MEDICAL CENTER | Age: 74
End: 2024-01-23
Payer: MEDICARE

## 2024-01-23 DIAGNOSIS — M25.551 RIGHT HIP PAIN: Primary | ICD-10-CM

## 2024-01-23 PROCEDURE — 97110 THERAPEUTIC EXERCISES: CPT | Performed by: PHYSICAL THERAPIST

## 2024-01-23 PROCEDURE — 97140 MANUAL THERAPY 1/> REGIONS: CPT | Performed by: PHYSICAL THERAPIST

## 2024-01-23 NOTE — PROGRESS NOTES
Daily Note     Today's date: 2024  Patient name: Pilar Laurent  : 1950  MRN: [id number]  Referring provider: Suleman Da Silva, JOSE FRANCISCO  Dx:   Encounter Diagnosis     ICD-10-CM    1. Right hip pain  M25.551                      Subjective: patient states that she has been icing and working on her exercise but her symptoms are still there.        Objective: See treatment diary below      Assessment: Tolerated treatment well. Patient exhibited good technique with therapeutic exercises and would benefit from continued PT      Plan: Continue per plan of care.      Precautions: none           Manuals             Hip flexor stretching  10sec x5            Quad stretch 10sec x5            roller 10min            Percussion SMT 5min            Neuro Re-Ed                                                                                                        Ther Ex             Standing hip flexor stretch 10sec x3            IT band stretching  10sec x3                                                                                          Ther Activity                                       Gait Training                                       Modalities

## 2024-02-02 ENCOUNTER — OFFICE VISIT (OUTPATIENT)
Dept: PHYSICAL THERAPY | Facility: MEDICAL CENTER | Age: 74
End: 2024-02-02
Payer: MEDICARE

## 2024-02-02 DIAGNOSIS — M25.551 RIGHT HIP PAIN: Primary | ICD-10-CM

## 2024-02-02 PROCEDURE — 97110 THERAPEUTIC EXERCISES: CPT | Performed by: PHYSICAL THERAPIST

## 2024-02-02 PROCEDURE — 97140 MANUAL THERAPY 1/> REGIONS: CPT | Performed by: PHYSICAL THERAPIST

## 2024-02-02 NOTE — PROGRESS NOTES
Daily Note     Today's date: 2024  Patient name: Pilar Laurent  : 1950  MRN: [id number]  Referring provider: Riaz Ambriz DO  Dx:   Encounter Diagnosis     ICD-10-CM    1. Right hip pain  M25.551                      Subjective: patient states that she has been doing ok.  She continues to have some issues with walking longer distances.       Objective: See treatment diary below      Assessment: Tolerated treatment well. Patient exhibited good technique with therapeutic exercises and would benefit from continued PT.  Progressing with strengthening and stretching program.  Patient is making solid gains however continues to have difficulty with walking for prolonged periods.       Plan: Continue per plan of care.      Precautions: none           Manuals             Hip flexor stretching  10sec x5            Quad stretch 10sec x5            roller 10min            Percussion SMT 5min            Neuro Re-Ed                                                                                                        Ther Ex             Standing hip flexor stretch 10sec x3            IT band stretching  10sec x3            Standing hip abduction 10x2            clamshells 30            bridge 30                                                   Ther Activity                                       Gait Training                                       Modalities

## 2024-02-07 ENCOUNTER — OFFICE VISIT (OUTPATIENT)
Dept: PHYSICAL THERAPY | Facility: MEDICAL CENTER | Age: 74
End: 2024-02-07
Payer: MEDICARE

## 2024-02-07 DIAGNOSIS — M25.551 RIGHT HIP PAIN: Primary | ICD-10-CM

## 2024-02-07 PROCEDURE — 97110 THERAPEUTIC EXERCISES: CPT | Performed by: PHYSICAL THERAPIST

## 2024-02-07 PROCEDURE — 97140 MANUAL THERAPY 1/> REGIONS: CPT | Performed by: PHYSICAL THERAPIST

## 2024-02-07 NOTE — PROGRESS NOTES
Daily Note     Today's date: 2024  Patient name: Pilar Laurent  : 1950  MRN: [id number]  Referring provider: Riaz Ambriz DO  Dx:   Encounter Diagnosis     ICD-10-CM    1. Right hip pain  M25.551                      Subjective: patient states that she has been doing ok.  She continues to have some issues with walking longer distances.       Objective: See treatment diary below      Assessment: Tolerated treatment well. Patient exhibited good technique with therapeutic exercises and would benefit from continued PT.  Progressing with strengthening and stretching program.  Patient is making solid gains however continues to have difficulty with walking for prolonged periods.       Plan: Continue per plan of care.      Precautions: none           Manuals             Hip flexor stretching  10sec x5            Quad stretch 10sec x5            roller 10min            Percussion SMT 5min            Neuro Re-Ed                                                                                                        Ther Ex             Standing hip flexor stretch 10sec x3            IT band stretching  10sec x3            Standing hip abduction 10x2            clamshells 30            bridge 30                                                   Ther Activity                                       Gait Training                                       Modalities

## 2024-02-09 ENCOUNTER — OFFICE VISIT (OUTPATIENT)
Dept: PHYSICAL THERAPY | Facility: MEDICAL CENTER | Age: 74
End: 2024-02-09
Payer: MEDICARE

## 2024-02-09 DIAGNOSIS — M25.551 RIGHT HIP PAIN: Primary | ICD-10-CM

## 2024-02-09 PROCEDURE — 97110 THERAPEUTIC EXERCISES: CPT | Performed by: PHYSICAL THERAPIST

## 2024-02-09 PROCEDURE — 97140 MANUAL THERAPY 1/> REGIONS: CPT | Performed by: PHYSICAL THERAPIST

## 2024-02-09 NOTE — PROGRESS NOTES
Daily Note     Today's date: 2024  Patient name: Pilar Laurent  : 1950  MRN: [id number]  Referring provider: Riaz Ambriz DO  Dx:   Encounter Diagnosis     ICD-10-CM    1. Right hip pain  M25.551                      Subjective: patient states that she has been doing ok.  She continues to have some issues with walking longer distances.       Objective: See treatment diary below      Assessment: Tolerated treatment well. Patient exhibited good technique with therapeutic exercises and would benefit from continued PT.  Progressing with strengthening and stretching program.  Patient is making solid gains however continues to have difficulty with walking for prolonged periods.       Plan: Continue per plan of care.      Precautions: none           Manuals             Hip flexor stretching  10sec x5            Quad stretch 10sec x5            roller 10min            Percussion SMT 5min            Neuro Re-Ed                                                                                                        Ther Ex             Standing hip flexor stretch 10sec x3            IT band stretching  10sec x3            Standing hip abduction 10x2            clamshells 30            bridge 30                                                   Ther Activity                                       Gait Training                                       Modalities

## 2024-02-12 ENCOUNTER — OFFICE VISIT (OUTPATIENT)
Dept: PHYSICAL THERAPY | Facility: MEDICAL CENTER | Age: 74
End: 2024-02-12
Payer: MEDICARE

## 2024-02-12 DIAGNOSIS — M25.551 RIGHT HIP PAIN: Primary | ICD-10-CM

## 2024-02-12 PROCEDURE — 97140 MANUAL THERAPY 1/> REGIONS: CPT | Performed by: PHYSICAL THERAPIST

## 2024-02-12 PROCEDURE — 97110 THERAPEUTIC EXERCISES: CPT | Performed by: PHYSICAL THERAPIST

## 2024-02-12 NOTE — PROGRESS NOTES
Daily Note     Today's date: 2024  Patient name: Pilar Laurent  : 1950  MRN: [id number]  Referring provider: Riaz Ambriz DO  Dx:   Encounter Diagnosis     ICD-10-CM    1. Right hip pain  M25.551                      Subjective: patient states that she has been doing ok.  She is now walking functional distances without issues however would like to start getting to walk for exercise.        Objective: See treatment diary below      Assessment: Tolerated treatment well. Patient exhibited good technique with therapeutic exercises and would benefit from continued PT.  Progressing with strengthening and stretching program.  Patient is making solid gains however continues to have difficulty with walking for prolonged periods. Patient was encouraged to start walking.       Plan: Continue per plan of care.      Precautions: none           Manuals             Hip flexor stretching  10sec x5            Quad stretch 10sec x5            roller 10min            Percussion SMT 5min            Neuro Re-Ed                                                                                                        Ther Ex             Standing hip flexor stretch 10sec x3            IT band stretching  10sec x3            Standing hip abduction 10x2            clamshells 30            bridge 30                                                   Ther Activity                                       Gait Training                                       Modalities

## 2024-02-14 ENCOUNTER — OFFICE VISIT (OUTPATIENT)
Dept: PHYSICAL THERAPY | Facility: MEDICAL CENTER | Age: 74
End: 2024-02-14
Payer: MEDICARE

## 2024-02-14 DIAGNOSIS — M25.551 RIGHT HIP PAIN: Primary | ICD-10-CM

## 2024-02-14 PROCEDURE — 97110 THERAPEUTIC EXERCISES: CPT | Performed by: PHYSICAL THERAPIST

## 2024-02-14 PROCEDURE — 97140 MANUAL THERAPY 1/> REGIONS: CPT | Performed by: PHYSICAL THERAPIST

## 2024-02-14 NOTE — PROGRESS NOTES
Daily Note     Today's date: 2024  Patient name: Pilar Laurent  : 1950  MRN: [id number]  Referring provider: Riaz Ambriz DO  Dx:   Encounter Diagnosis     ICD-10-CM    1. Right hip pain  M25.551                      Subjective: patient states that she has been doing ok and ready to take things on her own.       Objective: See treatment diary below      Assessment:Pilar Laurent has been compliant with attending PT and home exercise program since initial eval.  Pilar  has made improvements in objective data since initial evalulation and has achieved all goals.  Patient reports having returned to their prior level or function.  It was mutually agreed to Discharge to home exercise program at this time.       Precautions: none           Manuals             Hip flexor stretching  10sec x5            Quad stretch 10sec x5            roller 10min            Percussion SMT 5min            Neuro Re-Ed                                                                                                        Ther Ex             Standing hip flexor stretch 10sec x3            IT band stretching  10sec x3            Standing hip abduction 10x2            clamshells 30            bridge 30                                                   Ther Activity                                       Gait Training                                       Modalities

## 2024-05-09 LAB
ALBUMIN SERPL-MCNC: 4.3 G/DL (ref 3.5–5.7)
ALP SERPL-CCNC: 61 U/L (ref 35–120)
ALT SERPL-CCNC: 10 U/L
ANION GAP SERPL CALCULATED.3IONS-SCNC: 10 MMOL/L (ref 3–11)
AST SERPL-CCNC: 17 U/L
BASOPHILS # BLD AUTO: 0.1 THOU/CMM (ref 0–0.1)
BASOPHILS NFR BLD AUTO: 1 %
BILIRUB SERPL-MCNC: 0.8 MG/DL (ref 0.2–1)
BUN SERPL-MCNC: 36 MG/DL (ref 7–25)
CALCIUM SERPL-MCNC: 9.6 MG/DL (ref 8.5–10.1)
CHLORIDE SERPL-SCNC: 105 MMOL/L (ref 100–109)
CO2 SERPL-SCNC: 27 MMOL/L (ref 21–31)
CREAT SERPL-MCNC: 0.78 MG/DL (ref 0.4–1.1)
CYTOLOGY CMNT CVX/VAG CYTO-IMP: ABNORMAL
DIFFERENTIAL METHOD BLD: NORMAL
EOSINOPHIL # BLD AUTO: 0.2 THOU/CMM (ref 0–0.5)
EOSINOPHIL NFR BLD AUTO: 3 %
ERYTHROCYTE [DISTWIDTH] IN BLOOD BY AUTOMATED COUNT: 13.9 % (ref 12–16)
EST. AVERAGE GLUCOSE BLD GHB EST-MCNC: 123 MG/DL
GFR/BSA.PRED SERPLBLD CYS-BASED-ARV: 80 ML/MIN/{1.73_M2}
GLUCOSE SERPL-MCNC: 93 MG/DL (ref 65–99)
HBA1C MFR BLD: 5.9 %
HCT VFR BLD AUTO: 40.5 % (ref 35–43)
HGB BLD-MCNC: 13.7 G/DL (ref 11.5–14.5)
LYMPHOCYTES # BLD AUTO: 1.7 THOU/CMM (ref 1–3)
LYMPHOCYTES NFR BLD AUTO: 28 %
MCH RBC QN AUTO: 30.5 PG (ref 26–34)
MCHC RBC AUTO-ENTMCNC: 33.7 G/DL (ref 32–37)
MCV RBC AUTO: 91 FL (ref 80–100)
MONOCYTES # BLD AUTO: 0.7 THOU/CMM (ref 0.3–1)
MONOCYTES NFR BLD AUTO: 11 %
NEUTROPHILS # BLD AUTO: 3.4 THOU/CMM (ref 1.8–7.8)
NEUTROPHILS NFR BLD AUTO: 57 %
PLATELET # BLD AUTO: 237 THOU/CMM (ref 140–350)
PMV BLD REES-ECKER: 9.1 FL (ref 7.5–11.3)
POTASSIUM SERPL-SCNC: 4.7 MMOL/L (ref 3.5–5.2)
PROT SERPL-MCNC: 6.7 G/DL (ref 6.3–8.3)
RBC # BLD AUTO: 4.48 MILL/CMM (ref 3.7–4.7)
SODIUM SERPL-SCNC: 142 MMOL/L (ref 135–145)
TSH SERPL-ACNC: 2.73 UIU/ML (ref 0.45–5.33)
WBC # BLD AUTO: 6 THOU/CMM (ref 4–10)

## 2024-05-15 ENCOUNTER — OFFICE VISIT (OUTPATIENT)
Dept: FAMILY MEDICINE CLINIC | Facility: CLINIC | Age: 74
End: 2024-05-15
Payer: MEDICARE

## 2024-05-15 VITALS
WEIGHT: 190.4 LBS | SYSTOLIC BLOOD PRESSURE: 130 MMHG | TEMPERATURE: 97.6 F | HEART RATE: 64 BPM | DIASTOLIC BLOOD PRESSURE: 96 MMHG | BODY MASS INDEX: 29.88 KG/M2 | OXYGEN SATURATION: 99 % | HEIGHT: 67 IN

## 2024-05-15 DIAGNOSIS — I10 PRIMARY HYPERTENSION: ICD-10-CM

## 2024-05-15 DIAGNOSIS — E78.1 HYPERTRIGLYCERIDEMIA: ICD-10-CM

## 2024-05-15 DIAGNOSIS — Z00.00 MEDICARE ANNUAL WELLNESS VISIT, SUBSEQUENT: Primary | ICD-10-CM

## 2024-05-15 DIAGNOSIS — R39.9 UTI SYMPTOMS: ICD-10-CM

## 2024-05-15 DIAGNOSIS — N39.0 RECURRENT UTI: ICD-10-CM

## 2024-05-15 DIAGNOSIS — R73.09 ELEVATED GLUCOSE: ICD-10-CM

## 2024-05-15 LAB
SL AMB  POCT GLUCOSE, UA: NEGATIVE
SL AMB LEUKOCYTE ESTERASE,UA: NORMAL
SL AMB POCT BILIRUBIN,UA: NEGATIVE
SL AMB POCT BLOOD,UA: NEGATIVE
SL AMB POCT CLARITY,UA: CLEAR
SL AMB POCT COLOR,UA: YELLOW
SL AMB POCT KETONES,UA: NEGATIVE
SL AMB POCT NITRITE,UA: POSITIVE
SL AMB POCT PH,UA: 6
SL AMB POCT SPECIFIC GRAVITY,UA: <=1.005
SL AMB POCT URINE PROTEIN: NEGATIVE
SL AMB POCT UROBILINOGEN: NORMAL

## 2024-05-15 PROCEDURE — 87186 SC STD MICRODIL/AGAR DIL: CPT | Performed by: FAMILY MEDICINE

## 2024-05-15 PROCEDURE — G0444 DEPRESSION SCREEN ANNUAL: HCPCS | Performed by: FAMILY MEDICINE

## 2024-05-15 PROCEDURE — G0439 PPPS, SUBSEQ VISIT: HCPCS | Performed by: FAMILY MEDICINE

## 2024-05-15 PROCEDURE — 81003 URINALYSIS AUTO W/O SCOPE: CPT | Performed by: FAMILY MEDICINE

## 2024-05-15 PROCEDURE — 99214 OFFICE O/P EST MOD 30 MIN: CPT | Performed by: FAMILY MEDICINE

## 2024-05-15 PROCEDURE — 87077 CULTURE AEROBIC IDENTIFY: CPT | Performed by: FAMILY MEDICINE

## 2024-05-15 PROCEDURE — 87086 URINE CULTURE/COLONY COUNT: CPT | Performed by: FAMILY MEDICINE

## 2024-05-15 RX ORDER — NITROFURANTOIN 25; 75 MG/1; MG/1
100 CAPSULE ORAL DAILY
Qty: 30 CAPSULE | Refills: 1 | Status: SHIPPED | OUTPATIENT
Start: 2024-05-15 | End: 2024-06-14

## 2024-05-15 RX ORDER — SULFAMETHOXAZOLE AND TRIMETHOPRIM 800; 160 MG/1; MG/1
1 TABLET ORAL EVERY 12 HOURS SCHEDULED
Qty: 10 TABLET | Refills: 0 | Status: SHIPPED | OUTPATIENT
Start: 2024-05-15 | End: 2024-05-20

## 2024-05-15 NOTE — ASSESSMENT & PLAN NOTE
Questionnaire reviewed.  Immunization and health screening history updated.  Breast cancer screening, colon cancer screening up-to-date.  Advance directive in place.

## 2024-05-15 NOTE — ASSESSMENT & PLAN NOTE
Has seen urology in the past.  Will treat current acute UTI and restart Macrobid 100 mg daily for preventative therapy.

## 2024-05-15 NOTE — PATIENT INSTRUCTIONS
Medicare Preventive Visit Patient Instructions  Thank you for completing your Welcome to Medicare Visit or Medicare Annual Wellness Visit today. Your next wellness visit will be due in one year (5/16/2025).  The screening/preventive services that you may require over the next 5-10 years are detailed below. Some tests may not apply to you based off risk factors and/or age. Screening tests ordered at today's visit but not completed yet may show as past due. Also, please note that scanned in results may not display below.  Preventive Screenings:  Service Recommendations Previous Testing/Comments   Colorectal Cancer Screening  * Colonoscopy    * Fecal Occult Blood Test (FOBT)/Fecal Immunochemical Test (FIT)  * Fecal DNA/Cologuard Test  * Flexible Sigmoidoscopy Age: 45-75 years old   Colonoscopy: every 10 years (may be performed more frequently if at higher risk)  OR  FOBT/FIT: every 1 year  OR  Cologuard: every 3 years  OR  Sigmoidoscopy: every 5 years  Screening may be recommended earlier than age 45 if at higher risk for colorectal cancer. Also, an individualized decision between you and your healthcare provider will decide whether screening between the ages of 76-85 would be appropriate. Colonoscopy: 09/21/2021  FOBT/FIT: Not on file  Cologuard: Not on file  Sigmoidoscopy: Not on file    Screening Current     Breast Cancer Screening Age: 40+ years old  Frequency: every 1-2 years  Not required if history of left and right mastectomy Mammogram: 08/22/2023    Screening Current   Cervical Cancer Screening Between the ages of 21-29, pap smear recommended once every 3 years.   Between the ages of 30-65, can perform pap smear with HPV co-testing every 5 years.   Recommendations may differ for women with a history of total hysterectomy, cervical cancer, or abnormal pap smears in past. Pap Smear: Not on file    Screening Not Indicated   Hepatitis C Screening Once for adults born between 1945 and 1965  More frequently in  patients at high risk for Hepatitis C Hep C Antibody: 02/07/2022    Screening Current   Diabetes Screening 1-2 times per year if you're at risk for diabetes or have pre-diabetes Fasting glucose: No results in last 5 years (No results in last 5 years)  A1C: 5.9 % (5/9/2024)  Screening Current   Cholesterol Screening Once every 5 years if you don't have a lipid disorder. May order more often based on risk factors. Lipid panel: 08/26/2020    Screening Not Indicated  History Lipid Disorder     Other Preventive Screenings Covered by Medicare:  Abdominal Aortic Aneurysm (AAA) Screening: covered once if your at risk. You're considered to be at risk if you have a family history of AAA.  Lung Cancer Screening: covers low dose CT scan once per year if you meet all of the following conditions: (1) Age 55-77; (2) No signs or symptoms of lung cancer; (3) Current smoker or have quit smoking within the last 15 years; (4) You have a tobacco smoking history of at least 20 pack years (packs per day multiplied by number of years you smoked); (5) You get a written order from a healthcare provider.  Glaucoma Screening: covered annually if you're considered high risk: (1) You have diabetes OR (2) Family history of glaucoma OR (3)  aged 50 and older OR (4)  American aged 65 and older  Osteoporosis Screening: covered every 2 years if you meet one of the following conditions: (1) You're estrogen deficient and at risk for osteoporosis based off medical history and other findings; (2) Have a vertebral abnormality; (3) On glucocorticoid therapy for more than 3 months; (4) Have primary hyperparathyroidism; (5) On osteoporosis medications and need to assess response to drug therapy.   Last bone density test (DXA Scan): 04/09/2019.  HIV Screening: covered annually if you're between the age of 15-65. Also covered annually if you are younger than 15 and older than 65 with risk factors for HIV infection. For pregnant  patients, it is covered up to 3 times per pregnancy.    Immunizations:  Immunization Recommendations   Influenza Vaccine Annual influenza vaccination during flu season is recommended for all persons aged >= 6 months who do not have contraindications   Pneumococcal Vaccine   * Pneumococcal conjugate vaccine = PCV13 (Prevnar 13), PCV15 (Vaxneuvance), PCV20 (Prevnar 20)  * Pneumococcal polysaccharide vaccine = PPSV23 (Pneumovax) Adults 19-65 yo with certain risk factors or if 65+ yo  If never received any pneumonia vaccine: recommend Prevnar 20 (PCV20)  Give PCV20 if previously received 1 dose of PCV13 or PPSV23   Hepatitis B Vaccine 3 dose series if at intermediate or high risk (ex: diabetes, end stage renal disease, liver disease)   Respiratory syncytial virus (RSV) Vaccine - COVERED BY MEDICARE PART D  * RSVPreF3 (Arexvy) CDC recommends that adults 60 years of age and older may receive a single dose of RSV vaccine using shared clinical decision-making (SCDM)   Tetanus (Td) Vaccine - COST NOT COVERED BY MEDICARE PART B Following completion of primary series, a booster dose should be given every 10 years to maintain immunity against tetanus. Td may also be given as tetanus wound prophylaxis.   Tdap Vaccine - COST NOT COVERED BY MEDICARE PART B Recommended at least once for all adults. For pregnant patients, recommended with each pregnancy.   Shingles Vaccine (Shingrix) - COST NOT COVERED BY MEDICARE PART B  2 shot series recommended in those 19 years and older who have or will have weakened immune systems or those 50 years and older     Health Maintenance Due:      Topic Date Due   • Breast Cancer Screening: Mammogram  08/22/2024   • Colorectal Cancer Screening  09/21/2031   • Hepatitis C Screening  Completed     Immunizations Due:      Topic Date Due   • DTaP,Tdap,and Td Vaccines (2 - Td or Tdap) 01/01/2020     Advance Directives   What are advance directives?  Advance directives are legal documents that state your  wishes and plans for medical care. These plans are made ahead of time in case you lose your ability to make decisions for yourself. Advance directives can apply to any medical decision, such as the treatments you want, and if you want to donate organs.   What are the types of advance directives?  There are many types of advance directives, and each state has rules about how to use them. You may choose a combination of any of the following:  Living will:  This is a written record of the treatment you want. You can also choose which treatments you do not want, which to limit, and which to stop at a certain time. This includes surgery, medicine, IV fluid, and tube feedings.   Durable power of  for healthcare (DPAHC):  This is a written record that states who you want to make healthcare choices for you when you are unable to make them for yourself. This person, called a proxy, is usually a family member or a friend. You may choose more than 1 proxy.  Do not resuscitate (DNR) order:  A DNR order is used in case your heart stops beating or you stop breathing. It is a request not to have certain forms of treatment, such as CPR. A DNR order may be included in other types of advance directives.  Medical directive:  This covers the care that you want if you are in a coma, near death, or unable to make decisions for yourself. You can list the treatments you want for each condition. Treatment may include pain medicine, surgery, blood transfusions, dialysis, IV or tube feedings, and a ventilator (breathing machine).  Values history:  This document has questions about your views, beliefs, and how you feel and think about life. This information can help others choose the care that you would choose.  Why are advance directives important?  An advance directive helps you control your care. Although spoken wishes may be used, it is better to have your wishes written down. Spoken wishes can be misunderstood, or not followed.  Treatments may be given even if you do not want them. An advance directive may make it easier for your family to make difficult choices about your care.   Weight Management   Why it is important to manage your weight:  Being overweight increases your risk of health conditions such as heart disease, high blood pressure, type 2 diabetes, and certain types of cancer. It can also increase your risk for osteoarthritis, sleep apnea, and other respiratory problems. Aim for a slow, steady weight loss. Even a small amount of weight loss can lower your risk of health problems.  How to lose weight safely:  A safe and healthy way to lose weight is to eat fewer calories and get regular exercise. You can lose up about 1 pound a week by decreasing the number of calories you eat by 500 calories each day.   Healthy meal plan for weight management:  A healthy meal plan includes a variety of foods, contains fewer calories, and helps you stay healthy. A healthy meal plan includes the following:  Eat whole-grain foods more often.  A healthy meal plan should contain fiber. Fiber is the part of grains, fruits, and vegetables that is not broken down by your body. Whole-grain foods are healthy and provide extra fiber in your diet. Some examples of whole-grain foods are whole-wheat breads and pastas, oatmeal, brown rice, and bulgur.  Eat a variety of vegetables every day.  Include dark, leafy greens such as spinach, kale, mikael greens, and mustard greens. Eat yellow and orange vegetables such as carrots, sweet potatoes, and winter squash.   Eat a variety of fruits every day.  Choose fresh or canned fruit (canned in its own juice or light syrup) instead of juice. Fruit juice has very little or no fiber.  Eat low-fat dairy foods.  Drink fat-free (skim) milk or 1% milk. Eat fat-free yogurt and low-fat cottage cheese. Try low-fat cheeses such as mozzarella and other reduced-fat cheeses.  Choose meat and other protein foods that are low in fat.   Choose beans or other legumes such as split peas or lentils. Choose fish, skinless poultry (chicken or turkey), or lean cuts of red meat (beef or pork). Before you cook meat or poultry, cut off any visible fat.   Use less fat and oil.  Try baking foods instead of frying them. Add less fat, such as margarine, sour cream, regular salad dressing and mayonnaise to foods. Eat fewer high-fat foods. Some examples of high-fat foods include french fries, doughnuts, ice cream, and cakes.  Eat fewer sweets.  Limit foods and drinks that are high in sugar. This includes candy, cookies, regular soda, and sweetened drinks.  Exercise:  Exercise at least 30 minutes per day on most days of the week. Some examples of exercise include walking, biking, dancing, and swimming. You can also fit in more physical activity by taking the stairs instead of the elevator or parking farther away from stores. Ask your healthcare provider about the best exercise plan for you.      © Copyright [employer] 2018 Information is for End User's use only and may not be sold, redistributed or otherwise used for commercial purposes. All illustrations and images included in CareNotes® are the copyrighted property of A.[employer]M., Inc. or [employer]

## 2024-05-15 NOTE — PROGRESS NOTES
ambulatory Visit  Name: Pilar Laurent      : 1950      MRN: [id number]  Encounter Provider: Riaz Ambriz DO  Encounter Date: 5/15/2024   Encounter department: [facility]    Assessment & Plan   1. Medicare annual wellness visit, subsequent  Assessment & Plan:  Questionnaire reviewed.  Immunization and health screening history updated.  Breast cancer screening, colon cancer screening up-to-date.  Advance directive in place.  2. UTI symptoms  -     POCT urine dip auto non-scope  -     Urine culture  -     sulfamethoxazole-trimethoprim (BACTRIM DS) 800-160 mg per tablet; Take 1 tablet by mouth every 12 (twelve) hours for 5 days  3. Primary hypertension  Assessment & Plan:  Hypertension well-controlled continue lisinopril hydrochlorothiazide.  4. Hypertriglyceridemia  Assessment & Plan:  Managing lipids with diet and exercise.  Recheck lipid panel with next labs in 6 months.  Orders:  -     Comprehensive metabolic panel; Future; Expected date: 10/21/2024  -     Comprehensive metabolic panel; Future; Expected date: 10/21/2024  -     Lipid Panel with Direct LDL reflex; Future; Expected date: 10/21/2024  -     Comprehensive metabolic panel  -     Comprehensive metabolic panel  -     Lipid Panel with Direct LDL reflex  5. Elevated glucose  Assessment & Plan:  Hemoglobin A1c was stable at 5.9.  Continue diet and exercise.  Orders:  -     Hemoglobin A1C; Future; Expected date: 10/21/2024  -     Hemoglobin A1C  6. Recurrent UTI  Assessment & Plan:  Has seen urology in the past.  Will treat current acute UTI and restart Macrobid 100 mg daily for preventative therapy.  Orders:  -     nitrofurantoin (MACROBID) 100 mg capsule; Take 1 capsule (100 mg total) by mouth in the morning      Depression Screening and Follow-up Plan: Patient was screened for depression during today's encounter. They screened negative with a PHQ-2 score of 0.      Preventive health issues were discussed with patient, and  age appropriate screening tests were ordered as noted in patient's After Visit Summary. Personalized health advice and appropriate referrals for health education or preventive services given if needed, as noted in patient's After Visit Summary.    History of Present Illness     Patient was seen for annual wellness visit and for follow-up of chronic medical problems.  She is being treated for hypertension.  She also has a complaint of recurrent UTI symptoms.  She has seen urology in the past with no significant findings.  We have tried suppressive therapy with low-dose Macrodantin which helped somewhat but still she had a breakthrough with a UTI in April which was treated in urgent care.  She now has symptoms again for the past several days.  A home test was suggestive of UTI.    Urinary Tract Infection        Patient Care Team:  Riaz Ambriz DO as PCP - General (Family Medicine)  Barbie Jacobs PA-C (Family Medicine)    Review of Systems   Constitutional: Negative.    Respiratory: Negative.     Cardiovascular: Negative.    Gastrointestinal: Negative.    Genitourinary:  Positive for dysuria.   Musculoskeletal: Negative.    Psychiatric/Behavioral: Negative.       Medical History Reviewed by provider this encounter:  No text in [employer]       Annual Wellness Visit Questionnaire   Pilar is here for her Subsequent Wellness visit. Last Medicare Wellness visit information reviewed, patient interviewed and updates made to the record today.      Health Risk Assessment:   Patient rates overall health as excellent. Patient feels that their physical health rating is same. Patient is very satisfied with their life. Eyesight was rated as slightly worse. Hearing was rated as same. Patient feels that their emotional and mental health rating is same. Patients states they are never, rarely angry. Patient states they are never, rarely unusually tired/fatigued. Pain experienced in the last 7 days has been some. Patient's pain  rating has been 2/10. Patient states that she has experienced no weight loss or gain in last 6 months.     Depression Screening:   PHQ-2 Score: 0      Fall Risk Screening:   In the past year, patient has experienced: no history of falling in past year      Urinary Incontinence Screening:   Patient has not leaked urine accidently in the last six months.     Home Safety:  Patient does not have trouble with stairs inside or outside of their home. Patient has working smoke alarms and has working carbon monoxide detector. Home safety hazards include: none.     Nutrition:   Current diet is Regular.     Medications:   Patient is not currently taking any over-the-counter supplements. Patient is able to manage medications.     Activities of Daily Living (ADLs)/Instrumental Activities of Daily Living (IADLs):   Walk and transfer into and out of bed and chair?: Yes  Dress and groom yourself?: Yes    Bathe or shower yourself?: Yes    Feed yourself? Yes  Do your laundry/housekeeping?: Yes  Manage your money, pay your bills and track your expenses?: Yes  Make your own meals?: Yes    Do your own shopping?: Yes    Previous Hospitalizations:   Any hospitalizations or ED visits within the last 12 months?: No      Advance Care Planning:   Living will: Yes    Durable POA for healthcare: Yes    Advanced directive: Yes    ACP document given: Yes      Cognitive Screening:   Provider or family/friend/caregiver concerned regarding cognition?: No    PREVENTIVE SCREENINGS      Cardiovascular Screening:    General: Screening Not Indicated and History Lipid Disorder      Diabetes Screening:     General: Screening Current      Colorectal Cancer Screening:     General: Screening Current      Breast Cancer Screening:     General: Screening Current      Cervical Cancer Screening:    General: Screening Not Indicated      Osteoporosis Screening:    General: Screening Not Indicated      Abdominal Aortic Aneurysm (AAA) Screening:        General:  "Screening Not Indicated      Lung Cancer Screening:     General: Screening Not Indicated      Hepatitis C Screening:    General: Screening Current    Screening, Brief Intervention, and Referral to Treatment (SBIRT)    Screening  Typical number of drinks in a day: 0  Typical number of drinks in a week: 0  Interpretation: Low risk drinking behavior.    Single Item Drug Screening:  How often have you used an illegal drug (including marijuana) or a prescription medication for non-medical reasons in the past year? never    Single Item Drug Screen Score: 0  Interpretation: Negative screen for possible drug use disorder    Brief Intervention  Alcohol & drug use screenings were reviewed. No concerns regarding substance use disorder identified.     Annual Depression Screening  Time spent screening and evaluating the patient for depression during today's encounter was 5 minutes.    Social Determinants of Health     Food Insecurity: No Food Insecurity (5/15/2024)    Hunger Vital Sign    • Worried About Running Out of Food in the Last Year: Never true    • Ran Out of Food in the Last Year: Never true   Transportation Needs: No Transportation Needs (5/15/2024)    PRAPARE - Transportation    • Lack of Transportation (Medical): No    • Lack of Transportation (Non-Medical): No   Housing Stability: Low Risk  (5/15/2024)    Housing Stability Vital Sign    • Unable to Pay for Housing in the Last Year: No    • Number of Times Moved in the Last Year: 1    • Homeless in the Last Year: No   Utilities: Not At Risk (5/15/2024)    [facility] Utilities    • Threatened with loss of utilities: No     No results found.    Objective     /96 (BP Location: Left arm, Patient Position: Sitting, Cuff Size: Adult)   Pulse 64   Temp 97.6 °F (36.4 °C)   Ht 5' 6.5\" (1.689 m)   Wt 86.4 kg (190 lb 6.4 oz)   SpO2 99%   BMI 30.27 kg/m²     Physical Exam  Vitals and nursing note reviewed.   Constitutional:       Appearance: She is well-developed.   HENT: "      Head: Normocephalic and atraumatic.      Nose: Nose normal.      Mouth/Throat:      Mouth: Mucous membranes are moist.   Eyes:      Pupils: Pupils are equal, round, and reactive to light.   Cardiovascular:      Rate and Rhythm: Normal rate and regular rhythm.      Heart sounds: Normal heart sounds.   Pulmonary:      Effort: Pulmonary effort is normal.      Breath sounds: Normal breath sounds.   Musculoskeletal:         General: Normal range of motion.      Cervical back: Normal range of motion and neck supple.   Skin:     General: Skin is warm and dry.      Capillary Refill: Capillary refill takes less than 2 seconds.   Neurological:      Mental Status: She is alert and oriented to person, place, and time.   Psychiatric:         Behavior: Behavior normal.         Thought Content: Thought content normal.         Judgment: Judgment normal.     Questionnaire reviewed.  Immunizations and health screening history updated.  Administrative Statements

## 2024-05-17 LAB — BACTERIA UR CULT: ABNORMAL

## 2024-06-14 PROBLEM — Z00.00 MEDICARE ANNUAL WELLNESS VISIT, SUBSEQUENT: Status: RESOLVED | Noted: 2024-05-15 | Resolved: 2024-06-14

## 2024-06-14 PROBLEM — N39.0 RECURRENT UTI: Status: RESOLVED | Noted: 2024-05-15 | Resolved: 2024-06-14

## 2024-07-24 ENCOUNTER — OFFICE VISIT (OUTPATIENT)
Dept: FAMILY MEDICINE CLINIC | Facility: CLINIC | Age: 74
End: 2024-07-24
Payer: MEDICARE

## 2024-07-24 VITALS
OXYGEN SATURATION: 98 % | HEIGHT: 67 IN | SYSTOLIC BLOOD PRESSURE: 120 MMHG | WEIGHT: 191.4 LBS | TEMPERATURE: 98.3 F | HEART RATE: 67 BPM | BODY MASS INDEX: 30.04 KG/M2 | DIASTOLIC BLOOD PRESSURE: 80 MMHG

## 2024-07-24 DIAGNOSIS — H26.9 CATARACT OF BOTH EYES, UNSPECIFIED CATARACT TYPE: ICD-10-CM

## 2024-07-24 DIAGNOSIS — Z01.818 PRE-OP EXAMINATION: Primary | ICD-10-CM

## 2024-07-24 PROCEDURE — 99214 OFFICE O/P EST MOD 30 MIN: CPT | Performed by: NURSE PRACTITIONER

## 2024-07-24 NOTE — ASSESSMENT & PLAN NOTE
Medical record reviewed  Physical assessment unremarkable today  Patient is approved for planned eye surgery with low risk given good functional capacity and comorbidities stable and controlled.

## 2024-07-24 NOTE — PROGRESS NOTES
"Ambulatory Visit  Name: Pilar Laurent      : 1950      MRN: [id number]  Encounter Provider: SHELBY Marie  Encounter Date: 2024   Encounter department: [facility]    Assessment & Plan   1. Pre-op examination  Assessment & Plan:  Medical record reviewed  Physical assessment unremarkable today  Patient is approved for planned eye surgery with low risk given good functional capacity and comorbidities stable and controlled.  2. Cataract of both eyes, unspecified cataract type       History of Present Illness     Presents today for preop clearance.  She is scheduled for bilateral cataracts.  Was initially scheduled for , but had to reschedule due to death of a family member.  She denies any changes to her health.  She reports feeling well.  Remains physically active.  Medicare wellness exam completed May 2024-reviewed    Pilar Laurent  73 y.o.  female    SURGEON: Dr. Vo    SURGERY/PROCEDURE: Cataract - bilateral    DATE OF SURGERY: Right: ; Left     PRIOR ANESTHESIA:yes    COMPLICATION: no    BLEEDING PROBLEM: no    PERTINENT PMH: HTN - controlled    EXERCISE CAPACITY:   CAN WALK 4 BLOCKS AND OR CLIMB 2 FLIGHTS: Yes    HOME LIVING SITUATION SAFE AND SECURE: Yes      TOBACCO: no     ETOH: no     ILLEGAL DRUGS: no          Review of Systems   Constitutional: Negative.    HENT: Negative.     Respiratory: Negative.     Cardiovascular: Negative.    Gastrointestinal: Negative.    Genitourinary: Negative.    Neurological: Negative.    Psychiatric/Behavioral: Negative.         Objective     /80 (BP Location: Right arm, Patient Position: Sitting, Cuff Size: Adult)   Pulse 67   Temp 98.3 °F (36.8 °C)   Ht 5' 6.5\" (1.689 m)   Wt 86.8 kg (191 lb 6.4 oz)   SpO2 98%   BMI 30.43 kg/m²     Physical Exam  Vitals and nursing note reviewed.   Constitutional:       General: She is not in acute distress.     Appearance: Normal appearance. She is well-developed and " well-groomed. She is not ill-appearing.   HENT:      Head: Normocephalic.      Right Ear: Tympanic membrane and ear canal normal.      Left Ear: Tympanic membrane and ear canal normal.      Nose: Nose normal.      Mouth/Throat:      Mouth: Mucous membranes are moist.      Pharynx: Oropharynx is clear.   Neck:      Vascular: No carotid bruit.   Cardiovascular:      Rate and Rhythm: Normal rate and regular rhythm.      Heart sounds: Normal heart sounds.   Pulmonary:      Effort: Pulmonary effort is normal. No respiratory distress.      Breath sounds: Normal breath sounds and air entry.   Musculoskeletal:      Right lower leg: No edema.      Left lower leg: No edema.   Lymphadenopathy:      Cervical: No cervical adenopathy.   Skin:     General: Skin is warm and dry.   Neurological:      General: No focal deficit present.      Mental Status: She is alert and oriented to person, place, and time.   Psychiatric:         Attention and Perception: Attention normal.         Mood and Affect: Mood normal.         Behavior: Behavior normal.         Judgment: Judgment normal.       Administrative Statements

## 2024-07-26 ENCOUNTER — TELEPHONE (OUTPATIENT)
Age: 74
End: 2024-07-26

## 2024-07-26 NOTE — TELEPHONE ENCOUNTER
Pt had a PreOp clearance appt with Debra on 7/24, Chioma mercado/[facility] called requesting the PreOp form and clearance note be faxed to:    Attn: Chioma -- Fax: [phone]

## 2024-07-26 NOTE — TELEPHONE ENCOUNTER
Chioma from [facility] called and said they needed they Medical clearance faxed over, they still have not received it. Please fax to [phone] thank you.

## 2024-08-14 DIAGNOSIS — I10 ESSENTIAL HYPERTENSION: ICD-10-CM

## 2024-08-15 RX ORDER — LISINOPRIL AND HYDROCHLOROTHIAZIDE 12.5; 2 MG/1; MG/1
1 TABLET ORAL DAILY
Qty: 90 TABLET | Refills: 1 | Status: SHIPPED | OUTPATIENT
Start: 2024-08-15

## 2024-08-28 ENCOUNTER — APPOINTMENT (OUTPATIENT)
Dept: LAB | Facility: CLINIC | Age: 74
End: 2024-08-28
Payer: MEDICARE

## 2024-08-28 DIAGNOSIS — Z01.812 PRE-OPERATIVE LABORATORY EXAMINATION: ICD-10-CM

## 2024-08-29 ENCOUNTER — APPOINTMENT (OUTPATIENT)
Dept: LAB | Facility: CLINIC | Age: 74
End: 2024-08-29
Payer: MEDICARE

## 2024-08-29 LAB
ALBUMIN SERPL BCG-MCNC: 4 G/DL (ref 3.5–5)
ALP SERPL-CCNC: 64 U/L (ref 34–104)
ALT SERPL W P-5'-P-CCNC: 11 U/L (ref 7–52)
ANION GAP SERPL CALCULATED.3IONS-SCNC: 10 MMOL/L (ref 4–13)
AST SERPL W P-5'-P-CCNC: 17 U/L (ref 13–39)
BILIRUB SERPL-MCNC: 0.52 MG/DL (ref 0.2–1)
BUN SERPL-MCNC: 27 MG/DL (ref 5–25)
CALCIUM SERPL-MCNC: 9.3 MG/DL (ref 8.4–10.2)
CHLORIDE SERPL-SCNC: 103 MMOL/L (ref 96–108)
CO2 SERPL-SCNC: 27 MMOL/L (ref 21–32)
CREAT SERPL-MCNC: 0.71 MG/DL (ref 0.6–1.3)
GFR SERPL CREATININE-BSD FRML MDRD: 85 ML/MIN/1.73SQ M
GLUCOSE P FAST SERPL-MCNC: 97 MG/DL (ref 65–99)
POTASSIUM SERPL-SCNC: 3.6 MMOL/L (ref 3.5–5.3)
PROT SERPL-MCNC: 6.5 G/DL (ref 6.4–8.4)
SODIUM SERPL-SCNC: 140 MMOL/L (ref 135–147)

## 2024-08-29 PROCEDURE — 36415 COLL VENOUS BLD VENIPUNCTURE: CPT

## 2024-08-29 PROCEDURE — 80053 COMPREHEN METABOLIC PANEL: CPT

## 2024-09-12 ENCOUNTER — HOSPITAL ENCOUNTER (OUTPATIENT)
Dept: CT IMAGING | Facility: HOSPITAL | Age: 74
Discharge: HOME | End: 2024-09-12
Attending: COLON & RECTAL SURGERY
Payer: MEDICARE

## 2024-09-12 DIAGNOSIS — R10.9 ABDOMINAL PAIN, UNSPECIFIED ABDOMINAL LOCATION: ICD-10-CM

## 2024-09-12 PROCEDURE — 74177 CT ABD & PELVIS W/CONTRAST: CPT

## 2024-09-12 RX ADMIN — IOHEXOL 100 ML: 350 INJECTION, SOLUTION INTRAVENOUS at 13:46

## 2024-10-09 ENCOUNTER — OFFICE VISIT (OUTPATIENT)
Dept: PHYSICAL THERAPY | Facility: MEDICAL CENTER | Age: 74
End: 2024-10-09
Payer: MEDICARE

## 2024-10-09 DIAGNOSIS — G89.29 CHRONIC NECK PAIN: Primary | ICD-10-CM

## 2024-10-09 DIAGNOSIS — M54.2 CHRONIC NECK PAIN: Primary | ICD-10-CM

## 2024-10-09 PROCEDURE — 97162 PT EVAL MOD COMPLEX 30 MIN: CPT | Performed by: PHYSICAL THERAPIST

## 2024-10-09 PROCEDURE — 97140 MANUAL THERAPY 1/> REGIONS: CPT | Performed by: PHYSICAL THERAPIST

## 2024-10-09 PROCEDURE — 97010 HOT OR COLD PACKS THERAPY: CPT | Performed by: PHYSICAL THERAPIST

## 2024-10-09 NOTE — PROGRESS NOTES
PT Evaluation     Today's date: 10/9/2024  Patient name: Pilar Laurent  : 1950  MRN: [id number]  Referring provider: Riaz Ambriz DO  Dx:   Encounter Diagnosis     ICD-10-CM    1. Chronic neck pain  M54.2     G89.29                      Assessment/Plan  Patient is a 74 yo female who presents with symptoms of chronic neck pain secondary to hypmobility and exacerbation of DJD and DDD.  patient also demonstrates increased thoracic kyphosis leading to inappropriate overutilization of mid cervical spine as well as dysfunction of scapulothoracic rhythm.  Patient's symptoms of pain and stiffness were greatly improved with mobilization and gentle stretching.  Patient will benefit from skilled Physical therapy services being seen 2x a week for 4-6 weeks.    Subjective  Patient states that she has been doing well with her neck pain and has been working hard on doing her exercises, however 3 weeks ago she was cleaning her home and irritated her neck.  Symptoms have been very irritable and have caused severe loss of function.  Neck pain has worsened a bit and she would like to get relief and return to the gym and pickleball without pain.        Objective  Red Flags:  none  Pain: 3-810  Reflexes: 2/5 BUE  Posture: forward head with rounded shoulders.  increased thoracic kyphosis  AROM: limited with all movements of cervical spine.  Most discomfort with left rotation. Shoulder movement is full however patient noted that her motion has improved since injection  PROM:  shoulders are WFL  PAROM: limited with comparable sign with UPA of left C3-T5 and associated ribs.    Palpation: TTP of left upper traps and left lateral neck musculature  Special Tests: - dural sign,  No signs of radiculopathy, upper quarter screen normal, normal sensation  Coordination of Movement/strengthe: Patient demonstrates poor activation of Longus Capitus and Longus Coli with loss of feed forward mechanism with reaching tasks.   Patient was  able to perform activation with cueing.  Poor thoracic movement and disrupted ST rhythm.     Goals:  - Pt I with initial HEP in 1-2 visits  - Improve ROM to functional without pain.   - improved stabilizing structure activation and improved feed forward mechanism with distal activation  - Increase Functional Status Measure measured by FOTO by [facility]  - exercise as desired      Manual              Cervical STM 10min            C3-4 UPA left Gr. Iv- 10sec x5                                                       Exercise Diary              UBE             pec stretch             No monnies             Shoulder extension             Scapular retractions             Horizontal abduction             Chin tucks             Thoracic extension over foam roller             Foam roller on wall                                                                                                                                                                Modalities              Heat supine 10min

## 2024-10-09 NOTE — TELEPHONE ENCOUNTER
Additional Information  • Negative: Is this related to a work injury?  • Negative: Is this related to an MVA?  • Negative: Are you currently recieving homecare services?  • Negative: Has the patient had unexplained weight loss?  • Negative: Does the patient have a fever?  • Negative: Is the patient experiencing urine retention?  • Negative: Is the patient experiencing acute drop foot or paralysis?  • Negative: Has the patient experienced major trauma? (fall from height, high speed collision, direct blow to spine) and is also experiencing nausea, light-headedness, or loss of consciousness?  • Negative: Is the patient experiencing blood in sputum?  • Affirmative: Is this a chronic condition?    Protocols used: [facility] Protocol

## 2024-10-16 ENCOUNTER — APPOINTMENT (OUTPATIENT)
Dept: PHYSICAL THERAPY | Facility: MEDICAL CENTER | Age: 74
End: 2024-10-16
Payer: MEDICARE

## 2024-10-17 ENCOUNTER — APPOINTMENT (OUTPATIENT)
Dept: PHYSICAL THERAPY | Facility: MEDICAL CENTER | Age: 74
End: 2024-10-17
Payer: MEDICARE

## 2024-10-18 ENCOUNTER — OFFICE VISIT (OUTPATIENT)
Dept: PHYSICAL THERAPY | Facility: MEDICAL CENTER | Age: 74
End: 2024-10-18
Payer: MEDICARE

## 2024-10-18 DIAGNOSIS — G89.29 CHRONIC NECK PAIN: Primary | ICD-10-CM

## 2024-10-18 DIAGNOSIS — M54.2 CHRONIC NECK PAIN: Primary | ICD-10-CM

## 2024-10-18 PROCEDURE — 97140 MANUAL THERAPY 1/> REGIONS: CPT | Performed by: PHYSICAL THERAPIST

## 2024-10-18 PROCEDURE — 97110 THERAPEUTIC EXERCISES: CPT | Performed by: PHYSICAL THERAPIST

## 2024-10-18 NOTE — PROGRESS NOTES
Daily Note     Today's date: 10/18/2024  Patient name: Pilar Laurent  : 1950  MRN: [id number]  Referring provider: Riaz Ambriz DO  Dx:   Encounter Diagnosis     ICD-10-CM    1. Chronic neck pain  M54.2     G89.29                      Subjective: patient states that she was feeling better after the last visit but continues to have some discomfort in her neck on the left side.       Objective: See treatment diary below      Assessment: Tolerated treatment well. Patient exhibited good technique with therapeutic exercises and would benefit from continued PT.  Patient was progressed today with gentle mobilization and stability program.  All was tolerated well without increased pain.       Plan: Continue per plan of care.      Daily Treatment Diary     Manual              C3-5 left upa Gr. Iv- 10sec x5                                                                    Exercise Diary              UBE             pec stretch [id number]            No monnies 15x2 blue band            Shoulder extension 15x2 blue band            Scapular retractions 15x2 black band            Horizontal abduction 15x2 blue band            Chin tucks 15x2            Thoracic extension over foam roller 5secx3            Foam roller on wall 5 min                                                                                                                                                               Modalities

## 2024-10-29 ENCOUNTER — OFFICE VISIT (OUTPATIENT)
Dept: PHYSICAL THERAPY | Facility: MEDICAL CENTER | Age: 74
End: 2024-10-29
Payer: MEDICARE

## 2024-10-29 DIAGNOSIS — M54.2 CHRONIC NECK PAIN: Primary | ICD-10-CM

## 2024-10-29 DIAGNOSIS — G89.29 CHRONIC NECK PAIN: Primary | ICD-10-CM

## 2024-10-29 PROCEDURE — 97110 THERAPEUTIC EXERCISES: CPT | Performed by: PHYSICAL THERAPIST

## 2024-10-29 PROCEDURE — 97140 MANUAL THERAPY 1/> REGIONS: CPT | Performed by: PHYSICAL THERAPIST

## 2024-10-29 NOTE — PROGRESS NOTES
Daily Note     Today's date: 10/29/2024  Patient name: Pilar Laurent  : 1950  MRN: [id number]  Referring provider: Riaz Ambriz DO  Dx:   Encounter Diagnosis     ICD-10-CM    1. Chronic neck pain  M54.2     G89.29                      Subjective: patient states that she was feeling better after the last visit but continues to have some discomfort in her neck on the left side.       Objective: See treatment diary below      Assessment: Tolerated treatment well. Patient exhibited good technique with therapeutic exercises and would benefit from continued PT.  Patient was progressed today with gentle mobilization and stability program.  All was tolerated well without increased pain.       Plan: Continue per plan of care.      Daily Treatment Diary     Manual              C3-5 left upa Gr. Iv- 10sec x5            C3-5 rotational snags  10x2                                                       Exercise Diary              UBE             pec stretch             No monnies 15x2 blue band            Shoulder extension 15x2 blue band            Scapular retractions 15x2 black band            Horizontal abduction 15x2 blue band            Chin tucks             Thoracic extension over foam roller 5secx3            Foam roller on wall 5 min                                                                                                                                                               Modalities

## 2024-10-31 ENCOUNTER — OFFICE VISIT (OUTPATIENT)
Dept: PHYSICAL THERAPY | Facility: MEDICAL CENTER | Age: 74
End: 2024-10-31
Payer: MEDICARE

## 2024-10-31 DIAGNOSIS — M54.2 CHRONIC NECK PAIN: Primary | ICD-10-CM

## 2024-10-31 DIAGNOSIS — G89.29 CHRONIC NECK PAIN: Primary | ICD-10-CM

## 2024-10-31 PROCEDURE — 97110 THERAPEUTIC EXERCISES: CPT | Performed by: PHYSICAL THERAPIST

## 2024-10-31 PROCEDURE — 97140 MANUAL THERAPY 1/> REGIONS: CPT | Performed by: PHYSICAL THERAPIST

## 2024-10-31 NOTE — PROGRESS NOTES
Daily Note     Today's date: 10/31/2024  Patient name: Pilar Laurent  : 1950  MRN: [id number]  Referring provider: Riaz Ambriz DO  Dx:   Encounter Diagnosis     ICD-10-CM    1. Chronic neck pain  M54.2     G89.29                      Subjective: patient states that she was feeling more discomfort yesterday and today.  Notes that symptoms worsened after pickleball.       Objective: See treatment diary below      Assessment: Tolerated treatment well. Patient exhibited good technique with therapeutic exercises and would benefit from continued PT.  Patient was progressed today with gentle mobilization and stability program.  All was tolerated well without increased pain.       Plan: Continue per plan of care.      Daily Treatment Diary     Manual              C3-5 left upa Gr. Iv- 10sec x5            C3-5 rotational snags  10x2                                                       Exercise Diary              UBE             pec stretch             No monnies 15x2 blue band            Shoulder extension 15x2 blue band            Scapular retractions 15x2 black band            Horizontal abduction 15x2 blue band            Chin tucks             Thoracic extension over foam roller 5secx3            Foam roller on wall 5 min                                                                                                                                                               Modalities

## 2024-11-08 ENCOUNTER — OFFICE VISIT (OUTPATIENT)
Dept: PHYSICAL THERAPY | Facility: MEDICAL CENTER | Age: 74
End: 2024-11-08
Payer: MEDICARE

## 2024-11-08 DIAGNOSIS — M54.2 CHRONIC NECK PAIN: Primary | ICD-10-CM

## 2024-11-08 DIAGNOSIS — G89.29 CHRONIC NECK PAIN: Primary | ICD-10-CM

## 2024-11-08 PROCEDURE — 97140 MANUAL THERAPY 1/> REGIONS: CPT | Performed by: PHYSICAL THERAPIST

## 2024-11-08 PROCEDURE — 97110 THERAPEUTIC EXERCISES: CPT | Performed by: PHYSICAL THERAPIST

## 2024-11-08 PROCEDURE — 97010 HOT OR COLD PACKS THERAPY: CPT | Performed by: PHYSICAL THERAPIST

## 2024-11-08 NOTE — PROGRESS NOTES
Daily Note     Today's date: 2024  Patient name: Pilar Laurent  : 1950  MRN: [id number]  Referring provider: Riaz Ambriz DO  Dx:   Encounter Diagnosis     ICD-10-CM    1. Chronic neck pain  M54.2     G89.29                      Subjective: patient states that she was feeling more discomfort yesterday and today.  Notes that symptoms worsened after pickleball.       Objective: See treatment diary below      Assessment: Tolerated treatment well. Patient exhibited good technique with therapeutic exercises and would benefit from continued PT.  Patient was progressed today with gentle mobilization and stability program.  All was tolerated well without increased pain.       Plan: Continue per plan of care.      Daily Treatment Diary     Manual              C3-5 left upa Gr. Iv- 10sec x5            C3-5 rotational snags  10x2                                                       Exercise Diary              UBE             pec stretch             No monnies 15x2 blue band            Shoulder extension 15x2 blue band            Scapular retractions 15x2 black band            Horizontal abduction 15x2 blue band            Chin tucks             Thoracic extension over foam roller 5secx3            Foam roller on wall 5 min                                                                                                                                                               Modalities

## 2024-11-12 ENCOUNTER — OFFICE VISIT (OUTPATIENT)
Dept: PHYSICAL THERAPY | Facility: MEDICAL CENTER | Age: 74
End: 2024-11-12
Payer: MEDICARE

## 2024-11-12 DIAGNOSIS — G89.29 CHRONIC NECK PAIN: Primary | ICD-10-CM

## 2024-11-12 DIAGNOSIS — M54.2 CHRONIC NECK PAIN: Primary | ICD-10-CM

## 2024-11-12 PROCEDURE — 97110 THERAPEUTIC EXERCISES: CPT | Performed by: PHYSICAL THERAPIST

## 2024-11-12 PROCEDURE — 97010 HOT OR COLD PACKS THERAPY: CPT | Performed by: PHYSICAL THERAPIST

## 2024-11-12 PROCEDURE — 97140 MANUAL THERAPY 1/> REGIONS: CPT | Performed by: PHYSICAL THERAPIST

## 2024-11-12 NOTE — PROGRESS NOTES
Daily Note     Today's date: 2024  Patient name: Pilar Laurent  : 1950  MRN: [id number]  Referring provider: Riaz Ambriz DO  Dx:   Encounter Diagnosis     ICD-10-CM    1. Chronic neck pain  M54.2     G89.29                      Subjective: patient states that she is feeling a bit better.  Went to he gym today and working on everything she can without irritating her neck.       Objective: See treatment diary below      Assessment: Tolerated treatment well. Patient exhibited good technique with therapeutic exercises and would benefit from continued PT.  Patient was progressed today with gentle mobilization and stability program.  All was tolerated well without increased pain.       Plan: Continue per plan of care.      Daily Treatment Diary     Manual              C3-5 left upa Gr. Iv- 10sec x5            C3-5 rotational snags  10x2                                                       Exercise Diary              UBE             pec stretch             No monnies 15x2 blue band            Shoulder extension 15x2 blue band            Scapular retractions 15x2 black band            Horizontal abduction 15x2 blue band            Chin tucks             Thoracic extension over foam roller 5secx3            Foam roller on wall 5 min                                                                                                                                                               Modalities

## 2024-11-14 ENCOUNTER — OFFICE VISIT (OUTPATIENT)
Dept: PHYSICAL THERAPY | Facility: MEDICAL CENTER | Age: 74
End: 2024-11-14
Payer: MEDICARE

## 2024-11-14 DIAGNOSIS — M54.2 CHRONIC NECK PAIN: Primary | ICD-10-CM

## 2024-11-14 DIAGNOSIS — G89.29 CHRONIC NECK PAIN: Primary | ICD-10-CM

## 2024-11-14 PROCEDURE — 97140 MANUAL THERAPY 1/> REGIONS: CPT | Performed by: PHYSICAL THERAPIST

## 2024-11-14 PROCEDURE — 97012 MECHANICAL TRACTION THERAPY: CPT | Performed by: PHYSICAL THERAPIST

## 2024-11-14 PROCEDURE — 97110 THERAPEUTIC EXERCISES: CPT | Performed by: PHYSICAL THERAPIST

## 2024-11-14 NOTE — PROGRESS NOTES
Daily Note     Today's date: 2024  Patient name: Pilar Laurent  : 1950  MRN: [id number]  Referring provider: Riaz Ambriz DO  Dx:   Encounter Diagnosis     ICD-10-CM    1. Chronic neck pain  M54.2     G89.29                      Subjective: patient states that she is feeling a bit better.  Continues to have increased symptoms with playing pickleball.  Patient feels as though she might need to stop playing.       Objective: See treatment diary below      Assessment: Tolerated treatment well. Patient exhibited good technique with therapeutic exercises and would benefit from continued PT.  Patient was progressed today with gentle mobilization and stability program.  All was tolerated well without increased pain.  Trial of traction today which was tolerated well.       Plan: Continue per plan of care.      Daily Treatment Diary     Manual              C3-5 left upa Gr. Iv- 10sec x5            C3-5 rotational snags  10x2                                                       Exercise Diary              UBE             pec stretch             No monnies 15x2 blue band            Shoulder extension 15x2 blue band            Scapular retractions 15x2 black band            Horizontal abduction 15x2 blue band            Chin tucks             Thoracic extension over foam roller 5secx3            Foam roller on wall 5 min                                                                                                                                                               Modalities              Mechanical traction 10min 30/10 25lbs.

## 2024-11-18 ENCOUNTER — OFFICE VISIT (OUTPATIENT)
Dept: PHYSICAL THERAPY | Facility: MEDICAL CENTER | Age: 74
End: 2024-11-18
Payer: MEDICARE

## 2024-11-18 DIAGNOSIS — M54.2 CHRONIC NECK PAIN: Primary | ICD-10-CM

## 2024-11-18 DIAGNOSIS — G89.29 CHRONIC NECK PAIN: Primary | ICD-10-CM

## 2024-11-18 PROCEDURE — 97110 THERAPEUTIC EXERCISES: CPT | Performed by: PHYSICAL THERAPIST

## 2024-11-18 PROCEDURE — 97012 MECHANICAL TRACTION THERAPY: CPT | Performed by: PHYSICAL THERAPIST

## 2024-11-18 PROCEDURE — 97140 MANUAL THERAPY 1/> REGIONS: CPT | Performed by: PHYSICAL THERAPIST

## 2024-11-18 NOTE — PROGRESS NOTES
Daily Note     Today's date: 2024  Patient name: Pilar Laurent  : 1950  MRN: [id number]  Referring provider: Riaz Ambriz DO  Dx:   Encounter Diagnosis     ICD-10-CM    1. Chronic neck pain  M54.2     G89.29                      Subjective: patient states that she is feeling a bit better.  Feels as though traction seemed to help.       Objective: See treatment diary below      Assessment: Tolerated treatment well. Patient exhibited good technique with therapeutic exercises and would benefit from continued PT.  Patient was progressed today with gentle mobilization and stability program.  All was tolerated well without increased pain. Traction today which was tolerated well.       Plan: Continue per plan of care.      Daily Treatment Diary     Manual              C3-5 left upa Gr. Iv- 10sec x5            C3-5 rotational snags  10x2                                                       Exercise Diary              UBE             pec stretch             No monnies 15x2 blue band            Shoulder extension 15x2 blue band            Scapular retractions 15x2 black band            Horizontal abduction 15x2 blue band            Chin tucks             Thoracic extension over foam roller 5secx3            Foam roller on wall 5 min                                                                                                                                                               Modalities              Mechanical traction 10min 30/10 25lbs.                                              Car seat

## 2024-11-25 ENCOUNTER — OFFICE VISIT (OUTPATIENT)
Dept: PHYSICAL THERAPY | Facility: MEDICAL CENTER | Age: 74
End: 2024-11-25
Payer: MEDICARE

## 2024-11-25 DIAGNOSIS — G89.29 CHRONIC NECK PAIN: ICD-10-CM

## 2024-11-25 DIAGNOSIS — M54.2 CHRONIC NECK PAIN: ICD-10-CM

## 2024-11-25 DIAGNOSIS — M54.2 NECK PAIN: Primary | ICD-10-CM

## 2024-11-25 PROCEDURE — 97140 MANUAL THERAPY 1/> REGIONS: CPT | Performed by: PHYSICAL THERAPIST

## 2024-11-25 PROCEDURE — 97110 THERAPEUTIC EXERCISES: CPT | Performed by: PHYSICAL THERAPIST

## 2024-11-25 PROCEDURE — 97012 MECHANICAL TRACTION THERAPY: CPT | Performed by: PHYSICAL THERAPIST

## 2024-11-25 NOTE — PROGRESS NOTES
Daily Note     Today's date: 2024  Patient name: Pilar Laurent  : 1950  MRN: [id number]  Referring provider: Riaz Ambriz DO  Dx:   Encounter Diagnosis     ICD-10-CM    1. Neck pain  M54.2 Ambulatory referral to Spine [facility] Pain Management      2. Chronic neck pain  M54.2 Ambulatory referral to Spine & Pain Management    G89.29                      Subjective: patient states that she is feeling a bit better.  Continues to have ups and downs with her pain.  Symptoms are severe at times and patient notes that she is not doing the things that seem to irritate it.  Patient is frustrated with lack of progress and would like to consult with pain management at this time.       Objective: See treatment diary below      Assessment: Tolerated treatment well. Patient exhibited good technique with therapeutic exercises and would benefit from continued PT.  Patient was progressed today with gentle mobilization and stability program.  All was tolerated well without increased pain. Traction today which was tolerated well.       Plan: Continue per plan of care.      Daily Treatment Diary     Manual              C3-5 left upa Gr. Iv- 10sec x5            C3-5 rotational snags  10x2                                                       Exercise Diary              UBE             pec stretch             No monnies 15x2 blue band            Shoulder extension 15x2 blue band            Scapular retractions 15x2 black band            Horizontal abduction 15x2 blue band            Chin tucks             Thoracic extension over foam roller 5secx3            Foam roller on wall 5 min                                                                                                                                                               Modalities              Mechanical traction 10min 30/10 25lbs.

## 2024-11-27 ENCOUNTER — OFFICE VISIT (OUTPATIENT)
Dept: PHYSICAL THERAPY | Facility: MEDICAL CENTER | Age: 74
End: 2024-11-27
Payer: MEDICARE

## 2024-11-27 DIAGNOSIS — G89.29 CHRONIC NECK PAIN: ICD-10-CM

## 2024-11-27 DIAGNOSIS — M54.2 CHRONIC NECK PAIN: ICD-10-CM

## 2024-11-27 DIAGNOSIS — M54.2 NECK PAIN: Primary | ICD-10-CM

## 2024-11-27 PROCEDURE — 97140 MANUAL THERAPY 1/> REGIONS: CPT | Performed by: PHYSICAL THERAPIST

## 2024-11-27 PROCEDURE — 97110 THERAPEUTIC EXERCISES: CPT | Performed by: PHYSICAL THERAPIST

## 2024-11-27 NOTE — PROGRESS NOTES
Daily Note     Today's date: 2024  Patient name: Pilar Laurent  : 1950  MRN: [id number]  Referring provider: Riaz Ambriz DO  Dx:   Encounter Diagnosis     ICD-10-CM    1. Neck pain  M54.2       2. Chronic neck pain  M54.2     G89.29                      Subjective: patient states that she is feeling a bit better.  Continues to have ups and downs with her pain.  Symptoms are severe at times and patient notes that she is not doing the things that seem to irritate it.  Patient is frustrated with lack of progress and would like to consult with pain management at this time.       Objective: See treatment diary below      Assessment: Tolerated treatment well. Patient exhibited good technique with therapeutic exercises and would benefit from continued PT.  Patient was progressed today with gentle mobilization and stability program.  All was tolerated well without increased pain. Traction today which was tolerated well.       Plan: Continue per plan of care.      Daily Treatment Diary     Manual              C3-5 left upa Gr. Iv- 10sec x5            C3-5 rotational snags  10x2                                                       Exercise Diary              UBE             pec stretch             No monnies 15x2 blue band            Shoulder extension 15x2 blue band            Scapular retractions 15x2 black band            Horizontal abduction 15x2 blue band            Chin tucks             Thoracic extension over foam roller 5secx3            Foam roller on wall 5 min                                                                                                                                                               Modalities              Mechanical traction

## 2024-12-02 ENCOUNTER — APPOINTMENT (OUTPATIENT)
Age: 74
End: 2024-12-02
Payer: MEDICARE

## 2024-12-02 ENCOUNTER — CONSULT (OUTPATIENT)
Age: 74
End: 2024-12-02
Payer: MEDICARE

## 2024-12-02 VITALS — HEIGHT: 67 IN | BODY MASS INDEX: 29.98 KG/M2 | WEIGHT: 191 LBS

## 2024-12-02 DIAGNOSIS — G89.29 CHRONIC NECK PAIN: ICD-10-CM

## 2024-12-02 DIAGNOSIS — M54.2 NECK PAIN: ICD-10-CM

## 2024-12-02 DIAGNOSIS — M54.2 CHRONIC NECK PAIN: ICD-10-CM

## 2024-12-02 PROCEDURE — 72052 X-RAY EXAM NECK SPINE 6/>VWS: CPT

## 2024-12-02 PROCEDURE — 99204 OFFICE O/P NEW MOD 45 MIN: CPT | Performed by: STUDENT IN AN ORGANIZED HEALTH CARE EDUCATION/TRAINING PROGRAM

## 2024-12-02 RX ORDER — CYCLOBENZAPRINE HCL 5 MG
5 TABLET ORAL 2 TIMES DAILY PRN
Qty: 60 TABLET | Refills: 0 | Status: SHIPPED | OUTPATIENT
Start: 2024-12-02 | End: 2025-01-01

## 2024-12-02 RX ORDER — MELOXICAM 15 MG/1
15 TABLET ORAL DAILY
Qty: 30 TABLET | Refills: 0 | Status: SHIPPED | OUTPATIENT
Start: 2024-12-02 | End: 2025-01-01

## 2024-12-02 NOTE — PROGRESS NOTES
Assessment:  1. Neck pain    2. Chronic neck pain        Plan:    Pilar Laurent is a 73 y.o. female who presents for evaluation of left sided neck pain consistent with cervical facet arthopathy. We discussed imaging, rehabilitation, optimization of medications and potential interventions. The following plan was formulated with the patient:    - Meloxicam 15mg daily PRN. Advised to take with food  - Flexeril 5mg QHS PRN. Can uptitrate to BID PRN. Advised to watch for sedation.  - Discussed left sided C3-C5 diagnostic medial branch blocks and RFA. Provided literature for her to review. She would like to start with medication management at this time.  - X-ray Cervical Spine  - Follow up in 4 weeks     Orders Placed This Encounter   Procedures    XR spine cervical complete 6+ vw flex/ext/obl     Standing Status:   Future     Expected Date:   12/2/2024     Expiration Date:   12/2/2028     Scheduling Instructions:      Bring along any outside films relating to this procedure.             New Medications Ordered This Visit   Medications    meloxicam (MOBIC) 15 mg tablet     Sig: Take 1 tablet (15 mg total) by mouth daily     Dispense:  30 tablet     Refill:  0    cyclobenzaprine (FLEXERIL) 5 mg tablet     Sig: Take 1 tablet (5 mg total) by mouth 2 (two) times a day as needed for muscle spasms     Dispense:  60 tablet     Refill:  0       My impressions and treatment recommendations were discussed in detail with the patient, who verbalized understanding and had no further questions.    History of Present Illness:    Referred by: Suleman Da Silva, PT     Pilar Laurent is a 73 y.o. female who presents for initial evaluation of left sided neck pain. Pain started 8 weeks ago and is not associated with any inciting event or trauma. She describes the pain as dull/aching pain of moderate severity. Pain is rated 5/10 and interferes with daily activities. This pain is nearly constant. Exacerbating factors include looking upwards.  She has tried physical therapy for 8 weeks and Naproxen with mild relief.    Diagnostic studies include Xray Cervical spine 10/2020. I have personally reviewed pertinent films in PACS.    Pain is causing significant functional limitation resulting in diminished quality of life and impaired age appropriate ADLs.  Denies fever, chills, numbness/tingling, bowel/bladder dysfunction, motor weakness.    I have personally reviewed and/or updated the patient's past medical history, past surgical history, family history, social history, current medications, allergies, and vital signs today.     Review of Systems:    Review of Systems   Constitutional:  Positive for activity change.   HENT: Negative.     Eyes: Negative.    Respiratory: Negative.     Cardiovascular: Negative.    Gastrointestinal: Negative.    Endocrine: Negative.    Genitourinary: Negative.    Musculoskeletal:  Positive for back pain, neck pain and neck stiffness.   Skin: Negative.    Allergic/Immunologic: Negative.    Neurological: Negative.    Psychiatric/Behavioral: Negative.         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 Figueroa    HAND SURGERY Bilateral 2007    ROTATOR CUFF REPAIR Right     TONSILLECTOMY  1963    TOTAL KNEE ARTHROPLASTY Left 04/14/2013    TOTAL KNEE ARTHROPLASTY Right 1997    VAGINAL DELIVERY         Family History   Problem Relation Age of Onset    Lung cancer Sister     Heart disease Sister     Hypertension Paternal Grandmother     Melanoma Paternal Grandfather         In Situ of the Skin    No Known Problems Mother     No Known Problems  "Father     Heart disease Maternal Grandmother     Colon cancer Sister     Atrial fibrillation Sister        Social History     Occupational History    Occupation: Retired   Tobacco Use    Smoking status: Never    Smokeless tobacco: Never   Vaping Use    Vaping status: Never Used   Substance and Sexual Activity    Alcohol use: Not Currently     Comment: social, occ glass of wine    Drug use: No    Sexual activity: Not on file         Current Outpatient Medications:     cyclobenzaprine (FLEXERIL) 5 mg tablet, Take 1 tablet (5 mg total) by mouth 2 (two) times a day as needed for muscle spasms, Disp: 60 tablet, Rfl: 0    meloxicam (MOBIC) 15 mg tablet, Take 1 tablet (15 mg total) by mouth daily, Disp: 30 tablet, Rfl: 0    Cholecalciferol (VITAMIN D3) 2000 units capsule, Take 1 tablet by mouth daily, Disp: , Rfl:     lisinopril-hydrochlorothiazide (PRINZIDE,ZESTORETIC) 20-12.5 MG per tablet, TAKE 1 TABLET BY MOUTH DAILY, Disp: 90 tablet, Rfl: 1    No Known Allergies    Physical Exam:  Ht 5' 6.5\" (1.689 m)   Wt 86.6 kg (191 lb)   BMI 30.37 kg/m²     Constitutional: normal, well developed, well nourished, alert, in no distress and non-toxic and no overt pain behavior.  HEENT: grossly intact  Neck: supple, symmetric, trachea midline and no masses   Pulmonary:even and unlabored  Cardiovascular:No edema or pitting edema present  Skin:Normal without rashes or lesions and well hydrated  Psychiatric:Mood and affect appropriate  Neurologic:Cranial Nerves II-XII grossly intact  Musculoskeletal:    Cervical Musculoskeletal and Neurologic Exam:    Inspection: no atrophy of the UE musculature    Gait: non-antalgic    ROM: limited AROM of the cervical spine in all planes; FROM at bilateral shoulders    Sensation: intact and symmetric to light touch in bilateral C5-T1 dermatomes    Strength: 5/5 BUE      Reflexes: 2+ in bilateral biceps, and triceps. Bedolla's negative bilaterally    Palpation: TTP cervical paraspinals and trapezius " left side   Provocative tests:     Kemps (cervical extension and rotation): positive on left  Spurling's: negative bilaterally      Imaging  Xray Cervical Spine 10/2020  No fracture.      C3-C4 slight, C4-C5, C5-C6 mild spondylolisthesis.  Anatomic alignment otherwise.  Cervical lordosis straightening.     Upper and mid posterior facet arthrosis.  C4, C5-C6, C6-C7 osteophytes.  C6-C7 moderate degenerative disc space narrowing.      Bilateral C3-C4 through C6-C7 neuroforamen narrowing     The prevertebral soft tissues are within normal limits.       The lung apices are clear.     IMPRESSION:     Cervical lordosis straightening.     Degenerative changes.     No acute osseous abnormality

## 2024-12-05 ENCOUNTER — TELEPHONE (OUTPATIENT)
Dept: PAIN MEDICINE | Facility: CLINIC | Age: 74
End: 2024-12-05

## 2024-12-05 ENCOUNTER — OFFICE VISIT (OUTPATIENT)
Dept: PHYSICAL THERAPY | Facility: MEDICAL CENTER | Age: 74
End: 2024-12-05
Payer: MEDICARE

## 2024-12-05 DIAGNOSIS — M54.2 NECK PAIN: Primary | ICD-10-CM

## 2024-12-05 DIAGNOSIS — G89.29 CHRONIC NECK PAIN: ICD-10-CM

## 2024-12-05 DIAGNOSIS — M54.2 CHRONIC NECK PAIN: ICD-10-CM

## 2024-12-05 PROCEDURE — 97140 MANUAL THERAPY 1/> REGIONS: CPT | Performed by: PHYSICAL THERAPIST

## 2024-12-05 PROCEDURE — 97110 THERAPEUTIC EXERCISES: CPT | Performed by: PHYSICAL THERAPIST

## 2024-12-05 NOTE — TELEPHONE ENCOUNTER
Sctot Lee MD  [facility]  Xrays Cervical spine with chronic degenerative changes. C/w current plan

## 2024-12-05 NOTE — TELEPHONE ENCOUNTER
Caller: Luis Alberto    Doctor/Office: Jesus    Call regarding :  Xray Results     Call was transferred to: Triage nurse

## 2024-12-05 NOTE — PROGRESS NOTES
Daily Note     Today's date: 2024  Patient name: Pilar Laurent  : 1950  MRN: [id number]  Referring provider: Riaz Ambriz DO  Dx:   Encounter Diagnosis     ICD-10-CM    1. Neck pain  M54.2       2. Chronic neck pain  M54.2     G89.29                      Subjective: patient states that she is feeling a bit better.  She has met with pain management and was given Mobic.  She seems to be having some response to it as her pain is less.  She is not considering MBB at this time.     Objective: See treatment diary below      Assessment: Tolerated treatment well. Patient exhibited good technique with therapeutic exercises and would benefit from continued PT.  Patient was progressed today with gentle mobilization and stability program.  All was tolerated well without increased pain. Patient does not feel as though traction was helpful so it has been placed on hold.       Plan: Continue per plan of care.      Daily Treatment Diary     Manual              C3-5 left upa Gr. Iv- 10sec x5            C3-5 rotational snags  10x2                                                       Exercise Diary              UBE             pec stretch             No monnies 15x2 blue band            Shoulder extension 15x2 blue band            Scapular retractions 15x2 black band            Horizontal abduction 15x2 blue band            Chin tucks             Thoracic extension over foam roller 5secx3            Foam roller on wall 5 min                                                                                                                                                               Modalities              Mechanical traction

## 2024-12-09 ENCOUNTER — OFFICE VISIT (OUTPATIENT)
Dept: PHYSICAL THERAPY | Facility: MEDICAL CENTER | Age: 74
End: 2024-12-09
Payer: MEDICARE

## 2024-12-09 DIAGNOSIS — M54.2 NECK PAIN: Primary | ICD-10-CM

## 2024-12-09 DIAGNOSIS — M54.2 CHRONIC NECK PAIN: ICD-10-CM

## 2024-12-09 DIAGNOSIS — G89.29 CHRONIC NECK PAIN: ICD-10-CM

## 2024-12-09 PROCEDURE — 97140 MANUAL THERAPY 1/> REGIONS: CPT | Performed by: PHYSICAL THERAPIST

## 2024-12-09 PROCEDURE — 97110 THERAPEUTIC EXERCISES: CPT | Performed by: PHYSICAL THERAPIST

## 2024-12-09 NOTE — PROGRESS NOTES
Daily Note     Today's date: 2024  Patient name: Pilar Laurent  : 1950  MRN: [id number]  Referring provider: Riaz Ambriz DO  Dx:   Encounter Diagnosis     ICD-10-CM    1. Neck pain  M54.2       2. Chronic neck pain  M54.2     G89.29                      Subjective: patient states that she is feeling a bit better.      Objective: See treatment diary below      Assessment: Tolerated treatment well. Patient exhibited good technique with therapeutic exercises and would benefit from continued PT.  Patient was progressed today with gentle mobilization and stability program.  All was tolerated well without increased pain. Patient does not feel as though traction was helpful so it has been placed on hold.       Plan: Continue per plan of care.      Daily Treatment Diary     Manual              C3-5 left upa Gr. Iv- 10sec x5            C3-5 rotational snags  10x2                                                       Exercise Diary              UBE             pec stretch             No monnies 15x2 blue band            Shoulder extension 15x2 blue band            Scapular retractions 15x2 black band            Horizontal abduction 15x2 blue band            Chin tucks             Thoracic extension over foam roller 5secx3            Foam roller on wall 5 min                                                                                                                                                               Modalities              Mechanical traction

## 2024-12-11 ENCOUNTER — OFFICE VISIT (OUTPATIENT)
Dept: PHYSICAL THERAPY | Facility: MEDICAL CENTER | Age: 74
End: 2024-12-11
Payer: MEDICARE

## 2024-12-11 DIAGNOSIS — M54.2 NECK PAIN: Primary | ICD-10-CM

## 2024-12-11 PROCEDURE — 97110 THERAPEUTIC EXERCISES: CPT | Performed by: PHYSICAL THERAPIST

## 2024-12-11 PROCEDURE — 97140 MANUAL THERAPY 1/> REGIONS: CPT | Performed by: PHYSICAL THERAPIST

## 2024-12-11 NOTE — PROGRESS NOTES
Daily Note     Today's date: 2024  Patient name: Pilar Laurent  : 1950  MRN: [id number]  Referring provider: Riaz Ambriz DO  Dx:   Encounter Diagnosis     ICD-10-CM    1. Neck pain  M54.2                      Subjective: patient states that she continues to have the same issues and doesn't feel as though she is making much change with treatment.  While she always feels better after treatment she doesn't note lasting effects.  She would like to see how pain management treatment goes and return after if needed.     Objective: See treatment diary below      Assessment: Tolerated treatment well. Patient exhibited good technique with therapeutic exercises and would benefit from continued PT.  Patient will be placed on hold at this time for lack of progress.  She will follow up after injections (MBB).       Plan: Continue per plan of care.      Daily Treatment Diary     Manual              C3-5 left upa Gr. Iv- 10sec x5            C3-5 rotational snags  10x2                                                       Exercise Diary              UBE             pec stretch             No monnies 15x2 blue band            Shoulder extension 15x2 blue band            Scapular retractions 15x2 black band            Horizontal abduction 15x2 blue band            Chin tucks             Thoracic extension over foam roller 5secx3            Foam roller on wall 5 min                                                                                                                                                               Modalities              Mechanical traction

## 2024-12-18 ENCOUNTER — APPOINTMENT (OUTPATIENT)
Dept: PHYSICAL THERAPY | Facility: MEDICAL CENTER | Age: 74
End: 2024-12-18
Payer: MEDICARE

## 2024-12-20 ENCOUNTER — APPOINTMENT (OUTPATIENT)
Dept: PHYSICAL THERAPY | Facility: MEDICAL CENTER | Age: 74
End: 2024-12-20
Payer: MEDICARE

## 2024-12-23 ENCOUNTER — APPOINTMENT (OUTPATIENT)
Dept: PHYSICAL THERAPY | Facility: MEDICAL CENTER | Age: 74
End: 2024-12-23
Payer: MEDICARE

## 2024-12-27 ENCOUNTER — APPOINTMENT (OUTPATIENT)
Dept: PHYSICAL THERAPY | Facility: MEDICAL CENTER | Age: 74
End: 2024-12-27
Payer: MEDICARE

## 2024-12-30 ENCOUNTER — OFFICE VISIT (OUTPATIENT)
Age: 74
End: 2024-12-30
Payer: MEDICARE

## 2024-12-30 ENCOUNTER — TELEPHONE (OUTPATIENT)
Age: 74
End: 2024-12-30

## 2024-12-30 VITALS — HEIGHT: 67 IN | BODY MASS INDEX: 29.98 KG/M2 | WEIGHT: 191 LBS

## 2024-12-30 DIAGNOSIS — M54.2 NECK PAIN: Primary | ICD-10-CM

## 2024-12-30 PROCEDURE — 99214 OFFICE O/P EST MOD 30 MIN: CPT | Performed by: STUDENT IN AN ORGANIZED HEALTH CARE EDUCATION/TRAINING PROGRAM

## 2024-12-30 RX ORDER — CEPHALEXIN 500 MG/1
CAPSULE ORAL
COMMUNITY
Start: 2024-12-23

## 2024-12-30 RX ORDER — AZELASTINE 1 MG/ML
1 SPRAY, METERED NASAL 2 TIMES DAILY
COMMUNITY
Start: 2024-12-19

## 2024-12-30 RX ORDER — FLUTICASONE PROPIONATE 50 MCG
2 SPRAY, SUSPENSION (ML) NASAL DAILY
COMMUNITY

## 2024-12-30 RX ORDER — CYCLOBENZAPRINE HCL 5 MG
5 TABLET ORAL 3 TIMES DAILY PRN
Qty: 90 TABLET | Refills: 0 | Status: SHIPPED | OUTPATIENT
Start: 2024-12-30 | End: 2025-01-29

## 2024-12-30 RX ORDER — MELOXICAM 15 MG/1
15 TABLET ORAL DAILY PRN
Qty: 30 TABLET | Refills: 0 | Status: SHIPPED | OUTPATIENT
Start: 2024-12-30 | End: 2025-01-29

## 2024-12-30 NOTE — TELEPHONE ENCOUNTER
PA for FLEXERIL 5 MG SUBMITTED to [employer]    via    []CMM-KEY:   [x]Surescripts-Case ID # Case ID: [id number]   []Availity-Auth ID # NDC #   []Faxed to plan   []Other website   []Phone call Case ID #     [x]PA sent as URGENT    All office notes, labs and other pertaining documents and studies sent. Clinical questions answered. Awaiting determination from insurance company.     Turnaround time for your insurance to make a decision on your Prior Authorization can take 7-21 business days.

## 2024-12-30 NOTE — PROGRESS NOTES
Assessment:  1. Neck pain        Plan:    Pilar Laurent is a 74 y.o. female who presents for follow up office visit in regards to. We discussed imaging, rehabilitation, optimization of medications and potential interventions. The following plan was formulated with the patient:    - Meloxicam 15mg daily PRN. Advised to take with food  - Flexeril 5mg TID PRN. Advised to watch for sedation.  - Not interested in interventional options at this time  - Referral to chiropractor Dr. Hess per patient preference  - Follow up as needed     Orders Placed This Encounter   Procedures    Ambulatory Referral to Chiropractic     Standing Status:   Future     Expiration Date:   2025     Referral Priority:   Routine     Referral Type:   Chiropractic     Referral Reason:   Specialty Services Required     Referred to Provider:   Jensen Cooper DC     Requested Specialty:   Chiropractic Medicine     Number of Visits Requested:   1     Expiration Date:   2025       New Medications Ordered This Visit   Medications    cephalexin (KEFLEX) 500 mg capsule    fluticasone (FLONASE) 50 mcg/act nasal spray     Si sprays daily Shake liquid and spray    azelastine (ASTELIN) 0.1 % nasal spray     Si spray 2 (two) times a day    meloxicam (MOBIC) 15 mg tablet     Sig: Take 1 tablet (15 mg total) by mouth daily as needed for moderate pain     Dispense:  30 tablet     Refill:  0    cyclobenzaprine (FLEXERIL) 5 mg tablet     Sig: Take 1 tablet (5 mg total) by mouth 3 (three) times a day as needed for muscle spasms     Dispense:  90 tablet     Refill:  0       My impressions and treatment recommendations were discussed in detail with the patient, who verbalized understanding and had no further questions.    History of Present Illness:  Pilar Laurent is a 74 y.o. female who presents for a follow up office visit in regards to nonradiating left-sided axial neck pain. Since last visit pain is overall better. The patient’s current  symptoms include constant dull/aching pain in the left side. She reports overall pain has improved however today pain is rated 8/10 - she had people over for the holidays and was turning her head more and read last night which she think contributed to flare today. Meloxicam and Flexeril provide moderate relief.    Pain is causing significant functional limitation resulting in diminished quality of life and impaired age appropriate ADLs.  Denies fever, chills, numbness/tingling, bowel/bladder dysfunction, motor weakness.     I have personally reviewed and/or updated the patient's past medical history, past surgical history, family history, social history, current medications, allergies, and vital signs today.     Review of Systems   Constitutional:  Negative for chills and fever.   HENT:  Negative for ear pain and sore throat.    Eyes:  Negative for pain and visual disturbance.   Respiratory:  Negative for cough and shortness of breath.    Cardiovascular:  Negative for chest pain and palpitations.   Gastrointestinal:  Negative for abdominal pain and vomiting.   Genitourinary:  Negative for dysuria and hematuria.   Musculoskeletal:  Positive for myalgias, neck pain and neck stiffness. Negative for arthralgias and back pain.   Skin:  Negative for color change and rash.   Neurological:  Negative for seizures and syncope.   All other systems reviewed and are negative.      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 Figueroa    HAND SURGERY Bilateral 2007    ROTATOR CUFF REPAIR Right     TONSILLECTOMY  1963    TOTAL KNEE ARTHROPLASTY Left 04/14/2013    TOTAL KNEE ARTHROPLASTY Right 1997    VAGINAL DELIVERY         Family History   Problem Relation Age of Onset    Lung cancer Sister     Heart disease Sister     Hypertension Paternal Grandmother     Melanoma Paternal Grandfather         In Situ of the Skin    No Known Problems Mother     No Known Problems Father     Heart disease Maternal Grandmother     Colon cancer Sister     Atrial fibrillation Sister        Social History     Occupational History    Occupation: Retired   Tobacco Use    Smoking status: Never    Smokeless tobacco: Never   Vaping Use    Vaping status: Never Used   Substance and Sexual Activity    Alcohol use: Not Currently     Comment: social, occ glass of wine    Drug use: No    Sexual activity: Not on file       Current Outpatient Medications on File Prior to Visit   Medication Sig    azelastine (ASTELIN) 0.1 % nasal spray 1 spray 2 (two) times a day    cephalexin (KEFLEX) 500 mg capsule     Cholecalciferol (VITAMIN D3) 2000 units capsule Take 1 tablet by mouth daily    fluticasone (FLONASE) 50 mcg/act nasal spray 2 sprays daily Shake liquid and spray    lisinopril-hydrochlorothiazide (PRINZIDE,ZESTORETIC) 20-12.5 MG per tablet TAKE 1 TABLET BY MOUTH DAILY    [DISCONTINUED] cyclobenzaprine (FLEXERIL) 5 mg tablet Take 1 tablet (5 mg total) by mouth 2 (two) times a day as needed for muscle spasms    [DISCONTINUED] meloxicam (MOBIC) 15 mg tablet Take 1 tablet (15 mg total) by mouth daily     No current facility-administered medications on file prior to visit.       No Known Allergies    Physical Exam:    Ht 5' 6.5\" (1.689 m)   Wt 86.6 kg (191 lb)   BMI 30.37 kg/m²     Constitutional: normal, well developed, well nourished, alert, in no distress and " non-toxic and no overt pain behavior.  HEENT: grossly intact  Neck: supple, symmetric, trachea midline and no masses   Pulmonary:even and unlabored  Cardiovascular:No edema or pitting edema present  Skin:Normal without rashes or lesions and well hydrated  Psychiatric:Mood and affect appropriate  Neurologic:Cranial Nerves II-XII grossly intact    Cervical Musculoskeletal and Neurologic Exam:                        Inspection: no atrophy of the UE musculature                     Gait: non-antalgic                          ROM: limited AROM of the cervical spine in all planes; FROM at bilateral shoulders                          Sensation: intact and symmetric to light touch in bilateral C5-T1 dermatomes                       Strength: 5/5 BUE                              Reflexes: 2+ in bilateral biceps, and triceps. Bedolla's negative bilaterally                            Palpation: TTP cervical paraspinals and trapezius left side              Provocative tests:                                       Kemps (cervical extension and rotation): positive on left  Spurling's: negative bilaterally         Imaging  Xray Cervical Spine 12/2/24  No fracture.   Grade 1 anterolisthesis of C3 on C4, C4 on C5, C5 on C6.   Intervertebral disc space narrowing at C6-7 with endplate osteophytes.   No instability on flexion/extension views.   Neuroforaminal stenoses at C3-4 through C5-6 on the right.   Normal prevertebral soft tissues.   Clear lung apices.   IMPRESSION:   No acute osseous abnormality.   Degenerative changes as described.

## 2024-12-30 NOTE — TELEPHONE ENCOUNTER
PA for FLEXERIL 5 APPROVED     Date(s) approved  October 1, 2024 to March 30, 2025         Patient advised by          [][facility] Inchart Message  [x]Phone call   []LMOM  []L/M to call office as no active Communication consent on file  []Unable to leave detailed message as VM not approved on Communication consent       Pharmacy advised by    [x]Fax  []Phone call    Approval letter scanned into Media Yes

## 2025-01-02 ENCOUNTER — OFFICE VISIT (OUTPATIENT)
Age: 75
End: 2025-01-02
Payer: MEDICARE

## 2025-01-02 DIAGNOSIS — M50.30 DDD (DEGENERATIVE DISC DISEASE), CERVICAL: ICD-10-CM

## 2025-01-02 DIAGNOSIS — M99.02 SEGMENTAL DYSFUNCTION OF THORACIC REGION: ICD-10-CM

## 2025-01-02 DIAGNOSIS — M99.01 SEGMENTAL DYSFUNCTION OF CERVICAL REGION: Primary | ICD-10-CM

## 2025-01-02 DIAGNOSIS — M54.2 NECK PAIN: ICD-10-CM

## 2025-01-02 PROCEDURE — 99202 OFFICE O/P NEW SF 15 MIN: CPT | Performed by: CHIROPRACTOR

## 2025-01-02 PROCEDURE — 98940 CHIROPRACT MANJ 1-2 REGIONS: CPT | Performed by: CHIROPRACTOR

## 2025-01-02 NOTE — PROGRESS NOTES
Assessment/Plan:    1. Segmental dysfunction of cervical region        2. DDD (degenerative disc disease), cervical        3. Neck pain  Ambulatory Referral to Chiropractic      4. Segmental dysfunction of thoracic region            Review of Diagnostic Studies and Office Notes:    The patient's cervical spine x-ray images/report, PT notes, PMR notes were reviewed prior to the chiropractic evaluation today.      Results for orders placed in visit on 12/02/24    XR spine cervical complete 6+ vw flex/ext/obl    Narrative  XR SPINE CERVICAL COMPLETE 6+ VW FLEX /EXT /OBL    INDICATION: M54.2: Cervicalgia.    COMPARISON: C-spine x-ray 10/12/2020    FINDINGS:    No fracture.    Grade 1 anterolisthesis of C3 on C4, C4 on C5, C5 on C6.    Intervertebral disc space narrowing at C6-7 with endplate osteophytes.    No instability on flexion/extension views.    Neuroforaminal stenoses at C3-4 through C5-6 on the right.    Normal prevertebral soft tissues.    Clear lung apices.    Impression  No acute osseous abnormality.    Degenerative changes as described.      Workstation performed: [id number]             Decision and Treatment Plan:    I reviewed my findings with the patient today.  Patient was interested in receiving a trial course of chiropractic care and was treated today.  We will treat the patient 2x/week for the next three weeks and re-evaluate. If there is improvement in symptoms and objective findings, frequency will be reduced.       The patient will be treated with conservative chiropractic care including myofascial release, joint mobilization, distraction a home stretching and icing program.    Patient Instructions:    Return for treatment twice next week.   Ice 15 minutes as needed for post treatment soreness.  I did discuss with the patient that  Treatment would be on a trial basis treatment is similar to physical to her regularly over period of time helping to reduce the.  Hypertonicity, spasm, myofascial  pain as well with distraction of the cervical spine slowly over time.  I did advise her that there would not be any immediate results.  Treatment would be likely similar to what she was getting done in physical therapy.  Patient will consider whether she wants to go this route for contact Dr. Lee regarding other options as she did not know the treatment would be similar to what she was getting done in physical therapy.    Time/Reviewed with Patient:  Time:  At least 27 minutes of time was spent with the patient during the consultation including the patient's history/current complaints, physical exam, reviewing the diagnostic report, reviewing home instruction/reducing risk factors with the patient, reviewing my findings with the patient, and discussing the treatment with the patient.     This is a separate identifiable portion of today's visit from the E and M code billed.    Treatment today consisted of gentle manual traction to the cervical spine intermittently combined with gentle stairstepping mobilization.  No HVLA was performed.    Myofascial release was performed to the cervical paraspinals on the left as well as left suboccipital musculature, left upper trapezius..    Review of Systems   Constitutional:  Negative for chills, fever and unexpected weight change.   Gastrointestinal:  Negative for constipation and diarrhea.   Genitourinary:  Negative for difficulty urinating and urgency.       Subjective:      Pilar Laurent is a 74 y.o. female presents today for chiropractic evaluation of treatment.  She has a chief complaint of left-sided neck pain.  She was referred over by Dr. Lee.  She states she has had chronic neck pain in the past had physical therapy 1 to 2 years ago.  At that time it did not seem to help.  However about 3 to 4 months ago the symptoms returned.  She started physical therapy again and it did help a little bit but the pain persisted.  It appears she had a plateau.  She was referred  to our physiatrist, Dr. Lee.  She had x-rays done of the cervical spine.  She continued physical therapy a bit longer if her initial visit with Dr. Lee.  She states that sharp stabbing pain that she gets has diminished in frequency and intensity.  She now gets pain that is achy tight and stiff.  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  In the past she would play Pruffi ball 3 days/week.  She is very active at home a lot of the activities she does seem to aggravate her neck.  She has reduced the activities she does during her workout including overhead activity.  Reduce stress on her neck but there are still a lot of activities that cause pain.  She denies pain radiating down the arms, numbness or tingling in the upper extremities.  She was given medication including meloxicam and cyclobenzaprine.  She states the meloxicam seem to help.  Cyclobenzaprine does not seem to help.  She states she is taking as needed.  She states medial branch blocks, RFA were discussed with Dr. Lee but has decided to try more conservative care.    She denies fever, chills, night sweats, recent unexplained weight loss, urinary incontinence or retention.  She denies dizziness nausea vomiting palpitations, chest pain.     Objective:    Appearance: Well developed, well nourished, and in no acute distress    Alert and oriented x 3    Mood/Affect: calm and pleasant    Gait/Posture:    Gait: Normal    Antalgic posture: None    Lordosis: Cervical- decreased    Lordosis: Lumbar- normal, increased    Kyphosis: Thoracic- increased    Upper extremity reflexes:    Deep tendon reflexes were normal and symmetrical in the upper extremities.    Upper Extremity Muscle Testing:    Muscle testing of the bilateral upper extremities was normal and graded +5/5.    Bedolla's was negative bilaterally.    Cervical Orthopedic Tests:    Maximal foraminal Compression on the Right: negative .    Maximal foraminal  Compression on the Left: negative, axial pain was produced.      Active Cervical Ranges of Motion:    Cervical range of motion examination shows flexion to be 30% restricted with pain.  Extension is 60% restricted with pain.  Left rotation is 40% restricted with pain.  Right rotation is 30% restricted with pain on the left side.    Left shoulder range of motion is full and pain-free at the glenohumeral joint.    Palpation:    Moderate spasm and tenderness is noted in the cervical paraspinals more so in the mid to upper cervical region as well as the suboccipital musculature on the left.  Moderate spasm and tenderness is noted in the left upper trapezius.    Trigger points are noted in the left cervical paraspinals, suboccipital musculature, left upper trapezius.    Decreased mobility and tenderness is noted at C4-5, T3-4, T5-6.      Dictation voice to text software has been used in the creation of this document. Please consider this in light of any contextual or grammatical errors.

## 2025-01-09 ENCOUNTER — PROCEDURE VISIT (OUTPATIENT)
Age: 75
End: 2025-01-09
Payer: MEDICARE

## 2025-01-09 VITALS — BODY MASS INDEX: 29.98 KG/M2 | WEIGHT: 191 LBS | HEIGHT: 67 IN

## 2025-01-09 DIAGNOSIS — M99.01 SEGMENTAL DYSFUNCTION OF CERVICAL REGION: Primary | ICD-10-CM

## 2025-01-09 DIAGNOSIS — M99.02 SEGMENTAL DYSFUNCTION OF THORACIC REGION: ICD-10-CM

## 2025-01-09 DIAGNOSIS — M50.30 DDD (DEGENERATIVE DISC DISEASE), CERVICAL: ICD-10-CM

## 2025-01-09 DIAGNOSIS — M54.2 NECK PAIN: ICD-10-CM

## 2025-01-09 DIAGNOSIS — M79.18 MYOFASCIAL PAIN: ICD-10-CM

## 2025-01-09 PROCEDURE — 98940 CHIROPRACT MANJ 1-2 REGIONS: CPT | Performed by: CHIROPRACTOR

## 2025-01-09 NOTE — PROGRESS NOTES
Assessment:     1. Segmental dysfunction of cervical region        2. DDD (degenerative disc disease), cervical        3. Neck pain        4. Segmental dysfunction of thoracic region        5. Myofascial pain            Condition is the same.    PLAN/TREATMENT:    Return for treatment twice next week.     Ice 15 minutes as needed for posttreatment soreness.    Subjective:  Pilar is here today.with ongoing complaints of left sided neck pain.  Feels about the same overall.  Stopped playing pickle ball. Avoiding heavier weights.     Patient is feeling the same since last visit.    Objective:  Moderate spasm and tenderness is noted in the cervical paraspinals more so in the mid to upper cervical region as well as the suboccipital musculature on the left.  Moderate spasm and tenderness is noted in the left upper trapezius.     Trigger points are noted in the left cervical paraspinals, suboccipital musculature, left upper trapezius.     Decreased mobility and tenderness is noted at C4-5, T3-4, T5-6.

## 2025-01-14 ENCOUNTER — PROCEDURE VISIT (OUTPATIENT)
Age: 75
End: 2025-01-14
Payer: MEDICARE

## 2025-01-14 VITALS — HEIGHT: 67 IN | BODY MASS INDEX: 29.98 KG/M2 | WEIGHT: 191 LBS

## 2025-01-14 DIAGNOSIS — M99.01 SEGMENTAL DYSFUNCTION OF CERVICAL REGION: Primary | ICD-10-CM

## 2025-01-14 DIAGNOSIS — M99.02 SEGMENTAL DYSFUNCTION OF THORACIC REGION: ICD-10-CM

## 2025-01-14 DIAGNOSIS — M79.18 MYOFASCIAL PAIN: ICD-10-CM

## 2025-01-14 DIAGNOSIS — M50.30 DDD (DEGENERATIVE DISC DISEASE), CERVICAL: ICD-10-CM

## 2025-01-14 DIAGNOSIS — M54.2 NECK PAIN: ICD-10-CM

## 2025-01-14 PROCEDURE — 98940 CHIROPRACT MANJ 1-2 REGIONS: CPT | Performed by: CHIROPRACTOR

## 2025-01-14 NOTE — PROGRESS NOTES
Initial chiropractic visit--1/2/25, visit #3    Acute corrective treatment     Assessment:     1. Segmental dysfunction of cervical region        2. DDD (degenerative disc disease), cervical        3. Neck pain        4. Segmental dysfunction of thoracic region        5. Myofascial pain            Condition is the same.    PLAN/TREATMENT:    Return for treatment later this week.     Ice 15 minutes as needed for posttreatment soreness.    Subjective:  Pilar is here today.with ongoing complaints of left sided neck pain.  Felt a little sore the day after last visit.  Sunday woke with pain that subsided yesterday.  Pain isn't too bad today.   Patient is feeling the same since last visit.    Treatment: Myofascial release was performed to the cervical paraspinals, left levator scapula, left upper trapezius and rhomboids.  GT 4 was utilized briefly.    Manipulation was performed to C4-5, C5-6, T3-4 utilizing activator technique.  Gentle traction was applied to the cervical spine intermittently.    Objective:  Moderate spasm and tenderness is noted in the cervical paraspinals more so in the mid to upper cervical region as well as the suboccipital musculature on the left.  Moderate spasm and tenderness is noted in the left upper trapezius.     Trigger points are noted in the left cervical paraspinals, suboccipital musculature, left upper trapezius.     Decreased mobility and tenderness is noted at C4-5, T3-4, T5-6.

## 2025-01-17 ENCOUNTER — PROCEDURE VISIT (OUTPATIENT)
Age: 75
End: 2025-01-17
Payer: MEDICARE

## 2025-01-17 VITALS — WEIGHT: 191 LBS | BODY MASS INDEX: 29.98 KG/M2 | HEIGHT: 67 IN

## 2025-01-17 DIAGNOSIS — M99.01 SEGMENTAL DYSFUNCTION OF CERVICAL REGION: Primary | ICD-10-CM

## 2025-01-17 DIAGNOSIS — M50.30 DDD (DEGENERATIVE DISC DISEASE), CERVICAL: ICD-10-CM

## 2025-01-17 DIAGNOSIS — M99.02 SEGMENTAL DYSFUNCTION OF THORACIC REGION: ICD-10-CM

## 2025-01-17 DIAGNOSIS — M54.2 NECK PAIN: ICD-10-CM

## 2025-01-17 DIAGNOSIS — M79.18 MYOFASCIAL PAIN: ICD-10-CM

## 2025-01-17 PROCEDURE — 98940 CHIROPRACT MANJ 1-2 REGIONS: CPT | Performed by: CHIROPRACTOR

## 2025-01-17 NOTE — PROGRESS NOTES
Initial chiropractic visit--1/2/25, visit #4    Acute corrective treatment     Assessment:     1. Segmental dysfunction of cervical region        2. DDD (degenerative disc disease), cervical        3. Neck pain        4. Segmental dysfunction of thoracic region        5. Myofascial pain            Condition is the same.    PLAN/TREATMENT:    Return for treatment twice next week.     Ice 15 minutes as needed for posttreatment soreness.    Subjective:  Pilar is here today.with ongoing complaints of left sided neck pain.  Felt better yesterday with little pain.  Did some over shoulder exercises yesterday and pain returned.     Treatment: Myofascial release was performed to the cervical paraspinals, left levator scapula, left upper trapezius and rhomboids.  GT 4 was utilized briefly.    Manipulation was performed to C4-5, C5-6, T3-4 utilizing activator technique and stair stepping.  Gentle traction was applied to the cervical spine intermittently.    Objective:  Mild/moderate spasm and tenderness is noted in the cervical paraspinals more so in the mid to upper cervical region as well as the suboccipital musculature on the left.  Moderate spasm and tenderness is noted in the left upper trapezius.     Trigger points are noted in the left cervical paraspinals, suboccipital musculature, left upper trapezius.     Decreased mobility and tenderness is noted at C4-5, T3-4, T5-6.

## 2025-01-21 ENCOUNTER — PROCEDURE VISIT (OUTPATIENT)
Age: 75
End: 2025-01-21
Payer: MEDICARE

## 2025-01-21 DIAGNOSIS — M50.30 DDD (DEGENERATIVE DISC DISEASE), CERVICAL: ICD-10-CM

## 2025-01-21 DIAGNOSIS — M54.2 NECK PAIN: ICD-10-CM

## 2025-01-21 DIAGNOSIS — M99.01 SEGMENTAL DYSFUNCTION OF CERVICAL REGION: Primary | ICD-10-CM

## 2025-01-21 DIAGNOSIS — M79.18 MYOFASCIAL PAIN: ICD-10-CM

## 2025-01-21 DIAGNOSIS — M99.02 SEGMENTAL DYSFUNCTION OF THORACIC REGION: ICD-10-CM

## 2025-01-21 PROCEDURE — 98940 CHIROPRACT MANJ 1-2 REGIONS: CPT | Performed by: CHIROPRACTOR

## 2025-01-21 NOTE — PROGRESS NOTES
Initial chiropractic visit--1/2/25, visit #5    Acute corrective treatment     Assessment:     1. Segmental dysfunction of cervical region        2. DDD (degenerative disc disease), cervical        3. Neck pain        4. Segmental dysfunction of thoracic region        5. Myofascial pain            Condition is improving.     PLAN/TREATMENT:    Return for treatment twice next week.     Ice 15 minutes as needed for posttreatment soreness.    Subjective:  Pilar is here today.with ongoing complaints of left sided neck pain.  Feels better overall. With activity it didn't seem to tighten up too much.   Had to clean snow off car but didn't flare neck up.     Treatment: Myofascial release was performed to the cervical paraspinals, left levator scapula, left upper trapezius and rhomboids.  GT 4 was utilized briefly.    Manipulation was performed to C4-5, C5-6, T3-4 utilizing activator technique and stair stepping.  Gentle traction was applied to the cervical spine intermittently.    Objective:  Mild/moderate spasm and tenderness is noted in the cervical paraspinals more so in the mid to upper cervical region as well as the suboccipital musculature on the left.  Moderate spasm and tenderness is noted in the left upper trapezius.     Trigger points are noted in the left cervical paraspinals, suboccipital musculature, left upper trapezius.     Decreased mobility and tenderness is noted at C4-5, T3-4, T5-6.

## 2025-01-23 ENCOUNTER — PROCEDURE VISIT (OUTPATIENT)
Age: 75
End: 2025-01-23
Payer: MEDICARE

## 2025-01-23 DIAGNOSIS — M99.01 SEGMENTAL DYSFUNCTION OF CERVICAL REGION: Primary | ICD-10-CM

## 2025-01-23 DIAGNOSIS — M50.30 DDD (DEGENERATIVE DISC DISEASE), CERVICAL: ICD-10-CM

## 2025-01-23 DIAGNOSIS — M54.2 NECK PAIN: ICD-10-CM

## 2025-01-23 DIAGNOSIS — M99.02 SEGMENTAL DYSFUNCTION OF THORACIC REGION: ICD-10-CM

## 2025-01-23 DIAGNOSIS — M79.18 MYOFASCIAL PAIN: ICD-10-CM

## 2025-01-23 PROCEDURE — 98940 CHIROPRACT MANJ 1-2 REGIONS: CPT | Performed by: CHIROPRACTOR

## 2025-01-23 NOTE — PROGRESS NOTES
Initial chiropractic visit--1/2/25, visit #6    Acute corrective treatment     Assessment:     1. Segmental dysfunction of cervical region        2. DDD (degenerative disc disease), cervical        3. Neck pain        4. Segmental dysfunction of thoracic region        5. Myofascial pain            Condition is improving.     PLAN/TREATMENT:    Return for treatment twice next week.     Ice 15 minutes as needed for posttreatment soreness.    Subjective:  Pilar is here today.with ongoing complaints of left sided neck pain.  Feels better overall.  Pain comes and goes.  Feels 50% better.  Driving and sleeping position can bother it.  Supporting it helps.      Treatment:   Myofascial release was performed to the cervical paraspinals, left levator scapula, left upper trapezius and rhomboids.  GT 4 was utilized briefly.    Manipulation was performed to C4-5, C5-6, T3-4 utilizing activator technique and stair stepping.  Gentle traction was applied to the cervical spine intermittently.    Objective:  Mild/moderate spasm and tenderness is noted in the cervical paraspinals more so in the mid to upper cervical region as well as the suboccipital musculature on the left.  Moderate spasm and tenderness is noted in the left upper trapezius.     Trigger points are noted in the left cervical paraspinals, suboccipital musculature, left upper trapezius.     Decreased mobility and tenderness is noted at C4-5, T3-4, T5-6.

## 2025-01-28 ENCOUNTER — PROCEDURE VISIT (OUTPATIENT)
Age: 75
End: 2025-01-28
Payer: MEDICARE

## 2025-01-28 VITALS — WEIGHT: 191 LBS | HEIGHT: 67 IN | BODY MASS INDEX: 29.98 KG/M2

## 2025-01-28 DIAGNOSIS — M79.18 MYOFASCIAL PAIN: ICD-10-CM

## 2025-01-28 DIAGNOSIS — M99.01 SEGMENTAL DYSFUNCTION OF CERVICAL REGION: Primary | ICD-10-CM

## 2025-01-28 DIAGNOSIS — M54.2 NECK PAIN: ICD-10-CM

## 2025-01-28 DIAGNOSIS — M50.30 DDD (DEGENERATIVE DISC DISEASE), CERVICAL: ICD-10-CM

## 2025-01-28 DIAGNOSIS — M99.02 SEGMENTAL DYSFUNCTION OF THORACIC REGION: ICD-10-CM

## 2025-01-28 LAB
ALBUMIN SERPL-MCNC: 4.4 G/DL (ref 3.5–5.7)
ALP SERPL-CCNC: 64 U/L (ref 35–120)
ALT SERPL-CCNC: 12 U/L
ANION GAP SERPL CALCULATED.3IONS-SCNC: 7 MMOL/L (ref 3–11)
AST SERPL-CCNC: 20 U/L
BILIRUB SERPL-MCNC: 0.6 MG/DL (ref 0.2–1)
BUN SERPL-MCNC: 34 MG/DL (ref 7–25)
CALCIUM SERPL-MCNC: 9.6 MG/DL (ref 8.5–10.5)
CHLORIDE SERPL-SCNC: 104 MMOL/L (ref 100–109)
CHOLEST SERPL-MCNC: 194 MG/DL
CHOLEST/HDLC SERPL: 3.7 {RATIO}
CO2 SERPL-SCNC: 30 MMOL/L (ref 21–31)
CREAT SERPL-MCNC: 0.79 MG/DL (ref 0.4–1.1)
CYTOLOGY CMNT CVX/VAG CYTO-IMP: ABNORMAL
EST. AVERAGE GLUCOSE BLD GHB EST-MCNC: 123 MG/DL
GFR/BSA.PRED SERPLBLD CYS-BASED-ARV: 79 ML/MIN/{1.73_M2}
GLUCOSE SERPL-MCNC: 101 MG/DL (ref 65–99)
HBA1C MFR BLD: 5.9 %
HDLC SERPL-MCNC: 53 MG/DL (ref 23–92)
LDLC SERPL CALC-MCNC: 107 MG/DL
NONHDLC SERPL-MCNC: 141 MG/DL
POTASSIUM SERPL-SCNC: 4.5 MMOL/L (ref 3.5–5.2)
PROT SERPL-MCNC: 6.7 G/DL (ref 6.3–8.3)
SODIUM SERPL-SCNC: 141 MMOL/L (ref 135–145)
TRIGL SERPL-MCNC: 171 MG/DL

## 2025-01-28 PROCEDURE — 98940 CHIROPRACT MANJ 1-2 REGIONS: CPT | Performed by: CHIROPRACTOR

## 2025-01-28 NOTE — PROGRESS NOTES
Initial chiropractic visit--1/2/25, visit #7    Acute corrective treatment     Assessment:     1. Segmental dysfunction of cervical region        2. DDD (degenerative disc disease), cervical        3. Neck pain        4. Segmental dysfunction of thoracic region        5. Myofascial pain            Condition is improving.     PLAN/TREATMENT:    Return for treatment later this week.     Ice 15 minutes as needed for posttreatment soreness.    Subjective:  Pilar has intermittent pain.  Woke with pain Sunday.  Played pickle ball and had mild soreness.  Went to a pub last night and had pain after sitting as she was twisting/turning head to talk to people. Pain increased after.  Better today than last night.       Treatment:   Myofascial release was performed to the cervical paraspinals, left levator scapula, left upper trapezius and rhomboids.  GT 4 was utilized briefly.    Manipulation was performed to C4-5, C5-6, T3-4 utilizing activator technique and stair stepping. Gentle traction was applied to the cervical spine intermittently.    Objective:  Mild/moderate spasm and tenderness is noted in the cervical paraspinals more so in the mid to upper cervical region as well as the suboccipital musculature on the left.  Moderate spasm and tenderness is noted in the left upper trapezius.     Trigger points are noted in the left cervical paraspinals, suboccipital musculature, left upper trapezius.     Decreased mobility and tenderness is noted at C4-5, T3-4, T5-6.

## 2025-01-30 ENCOUNTER — PROCEDURE VISIT (OUTPATIENT)
Age: 75
End: 2025-01-30
Payer: MEDICARE

## 2025-01-30 VITALS — WEIGHT: 191 LBS | BODY MASS INDEX: 29.98 KG/M2 | HEIGHT: 67 IN

## 2025-01-30 DIAGNOSIS — M79.18 MYOFASCIAL PAIN: ICD-10-CM

## 2025-01-30 DIAGNOSIS — M99.01 SEGMENTAL DYSFUNCTION OF CERVICAL REGION: Primary | ICD-10-CM

## 2025-01-30 DIAGNOSIS — M54.2 NECK PAIN: ICD-10-CM

## 2025-01-30 DIAGNOSIS — M99.02 SEGMENTAL DYSFUNCTION OF THORACIC REGION: ICD-10-CM

## 2025-01-30 DIAGNOSIS — M50.30 DDD (DEGENERATIVE DISC DISEASE), CERVICAL: ICD-10-CM

## 2025-01-30 PROCEDURE — 98940 CHIROPRACT MANJ 1-2 REGIONS: CPT | Performed by: CHIROPRACTOR

## 2025-02-03 ENCOUNTER — PROCEDURE VISIT (OUTPATIENT)
Age: 75
End: 2025-02-03
Payer: MEDICARE

## 2025-02-03 VITALS — HEIGHT: 67 IN | BODY MASS INDEX: 29.98 KG/M2 | WEIGHT: 191 LBS

## 2025-02-03 DIAGNOSIS — M99.02 SEGMENTAL DYSFUNCTION OF THORACIC REGION: ICD-10-CM

## 2025-02-03 DIAGNOSIS — M79.18 MYOFASCIAL PAIN: ICD-10-CM

## 2025-02-03 DIAGNOSIS — M99.01 SEGMENTAL DYSFUNCTION OF CERVICAL REGION: Primary | ICD-10-CM

## 2025-02-03 DIAGNOSIS — M54.2 NECK PAIN: ICD-10-CM

## 2025-02-03 DIAGNOSIS — M50.30 DDD (DEGENERATIVE DISC DISEASE), CERVICAL: ICD-10-CM

## 2025-02-03 PROCEDURE — 98940 CHIROPRACT MANJ 1-2 REGIONS: CPT | Performed by: CHIROPRACTOR

## 2025-02-03 NOTE — PROGRESS NOTES
Initial chiropractic visit--1/2/25, visit #9    Acute corrective treatment     Assessment:     1. Segmental dysfunction of cervical region        2. DDD (degenerative disc disease), cervical        3. Neck pain        4. Segmental dysfunction of thoracic region        5. Myofascial pain            Condition is mildly flared today.    PLAN/TREATMENT:  Return for treatment later this week.   Ice 15 minutes as needed for posttreatment soreness.    Subjective:  Pilar has left sided neck pain. Did some cleaning Friday which aggravated neck. It improved with rest and heat.  Woke with increased  Taking Meloxicam twice per week now.        Treatment:   Myofascial release was performed to the cervical paraspinals, left levator scapula, left upper trapezius and rhomboids.  GT 4 was utilized briefly.    Manipulation was performed to C4-5, C5-6, T3-4 utilizing activator technique and stair stepping. Gentle traction was applied to the cervical spine intermittently.    Objective:  Moderate spasm and tenderness is noted in the cervical paraspinals more so in the mid to upper cervical region as well as the suboccipital musculature on the left. Increased cervical paraspinal spasm on left noted today.  Moderate spasm and tenderness is noted in the left upper trapezius.     Trigger points are noted in the left cervical paraspinals, suboccipital musculature, left upper trapezius.     Decreased mobility and tenderness is noted at C4-5, T3-4, T5-6.

## 2025-02-04 ENCOUNTER — OFFICE VISIT (OUTPATIENT)
Dept: FAMILY MEDICINE CLINIC | Facility: CLINIC | Age: 75
End: 2025-02-04
Payer: MEDICARE

## 2025-02-04 ENCOUNTER — TELEPHONE (OUTPATIENT)
Dept: OBGYN CLINIC | Facility: HOSPITAL | Age: 75
End: 2025-02-04

## 2025-02-04 VITALS
OXYGEN SATURATION: 99 % | WEIGHT: 196 LBS | HEART RATE: 76 BPM | TEMPERATURE: 97.2 F | SYSTOLIC BLOOD PRESSURE: 142 MMHG | BODY MASS INDEX: 31.5 KG/M2 | HEIGHT: 66 IN | DIASTOLIC BLOOD PRESSURE: 88 MMHG

## 2025-02-04 DIAGNOSIS — R73.09 ELEVATED GLUCOSE: Primary | ICD-10-CM

## 2025-02-04 DIAGNOSIS — I10 PRIMARY HYPERTENSION: ICD-10-CM

## 2025-02-04 DIAGNOSIS — E78.1 HYPERTRIGLYCERIDEMIA: ICD-10-CM

## 2025-02-04 DIAGNOSIS — E78.5 HYPERLIPIDEMIA, UNSPECIFIED HYPERLIPIDEMIA TYPE: ICD-10-CM

## 2025-02-04 PROCEDURE — 99214 OFFICE O/P EST MOD 30 MIN: CPT | Performed by: FAMILY MEDICINE

## 2025-02-04 PROCEDURE — G2211 COMPLEX E/M VISIT ADD ON: HCPCS | Performed by: FAMILY MEDICINE

## 2025-02-04 RX ORDER — ESTRADIOL 0.1 MG/G
CREAM VAGINAL DAILY
COMMUNITY
Start: 2025-01-22

## 2025-02-04 NOTE — PROGRESS NOTES
Name: Pilar Laurent      : 1950      MRN: [id number]  Encounter Provider: Riaz Ambriz DO  Encounter Date: 2025   Encounter department: [facility]    Assessment & Plan  Elevated glucose  Hemoglobin A1c 5.9.  Stable over at least 3 years.  Orders:  •  Hemoglobin A1C; Future    Primary hypertension  Blood pressure well-controlled on lisinopril hydrochlorothiazide 20/12.5  Orders:  •  Comprehensive metabolic panel; Future  •  TSH, 3rd generation with Free T4 reflex; Future    Hypertriglyceridemia  Triglycerides mildly elevated at 171.  LDL above goal at 107       Hyperlipidemia, unspecified hyperlipidemia type  Continue diet and exercise.  Goal for LDL less than 100  Orders:  •  LDL cholesterol, direct; Future         History of Present Illness     Patient was seen for follow-up of chronic medical problems.  She is being treated for hypertriglyceridemia, hypertension, elevated glucose and neck pain.      Review of Systems   Constitutional: Negative.    Respiratory: Negative.     Cardiovascular: Negative.    Gastrointestinal: Negative.    Genitourinary: Negative.    Musculoskeletal: Negative.    Psychiatric/Behavioral: Negative.       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 Figueroa   • HAND SURGERY Bilateral    • ROTATOR CUFF REPAIR Right    • TONSILLECTOMY  1963   • TOTAL KNEE ARTHROPLASTY Left 2013   • TOTAL KNEE ARTHROPLASTY Right    • VAGINAL DELIVERY       Family History   Problem Relation Age of Onset   • Lung cancer Sister    • Heart disease Sister    •  Hypertension Paternal Grandmother    • Melanoma Paternal Grandfather         In Situ of the Skin   • No Known Problems Mother    • No Known Problems Father    • Heart disease Maternal Grandmother    • Colon cancer Sister    • Atrial fibrillation Sister      Social History     Tobacco Use   • Smoking status: Never   • Smokeless tobacco: Never   Vaping Use   • Vaping status: Never Used   Substance and Sexual Activity   • Alcohol use: Not Currently     Comment: social, occ glass of wine   • Drug use: No   • Sexual activity: Not on file     Current Outpatient Medications on File Prior to Visit   Medication Sig   • azelastine (ASTELIN) 0.1 % nasal spray 1 spray 2 (two) times a day   • cephalexin (KEFLEX) 500 mg capsule    • Cholecalciferol (VITAMIN D3) 2000 units capsule Take 1 tablet by mouth daily   • estradiol (ESTRACE) 0.1 mg/g vaginal cream Insert into the vagina daily (Patient taking differently: Insert into the vagina 2 (two) times a week)   • fluticasone (FLONASE) 50 mcg/act nasal spray 2 sprays daily Shake liquid and spray   • lisinopril-hydrochlorothiazide (PRINZIDE,ZESTORETIC) 20-12.5 MG per tablet TAKE 1 TABLET BY MOUTH DAILY   • cyclobenzaprine (FLEXERIL) 5 mg tablet Take 1 tablet (5 mg total) by mouth 3 (three) times a day as needed for muscle spasms   • meloxicam (MOBIC) 15 mg tablet Take 1 tablet (15 mg total) by mouth daily as needed for moderate pain     No Known Allergies  Immunization History   Administered Date(s) Administered   • COVID-19 MODERNA VACC 0.5 ML IM 01/25/2021, 02/22/2021, 10/28/2021   • COVID-19 Moderna Vac BIVALENT 12 Yr+ IM 0.5 ML 09/23/2022   • COVID-19 Pfizer mRNA vacc PF kamini-sucrose 12 yr and older (Comirnaty) 01/02/2024   • COVID-19, unspecified 10/28/2021   • INFLUENZA 10/19/2015, 10/23/2017, 10/13/2021   • Influenza Quadrivalent, 6-35 Months IM 10/19/2015   • Influenza Split High Dose Preservative Free IM 10/23/2017, 10/23/2019, 10/12/2020   • Influenza, high dose seasonal 0.7  "mL 10/23/2018, 10/23/2019, 10/12/2020, 10/03/2023   • Pneumococcal Conjugate 13-Valent 10/23/2017   • Pneumococcal Polysaccharide PPV23 10/23/2018   • Tdap 01/01/2010   • Zoster 10/19/2015   • Zoster Vaccine Recombinant 11/10/2022     Objective   /88   Pulse 76   Temp (!) 97.2 °F (36.2 °C)   Ht 5' 6\" (1.676 m)   Wt 88.9 kg (196 lb)   SpO2 99%   BMI 31.64 kg/m²     Physical Exam  Vitals and nursing note reviewed.   Constitutional:       General: She is not in acute distress.     Appearance: Normal appearance.   HENT:      Head: Normocephalic and atraumatic.   Eyes:      Pupils: Pupils are equal, round, and reactive to light.   Cardiovascular:      Rate and Rhythm: Normal rate and regular rhythm.      Pulses: Normal pulses.      Heart sounds: Normal heart sounds.   Pulmonary:      Effort: Pulmonary effort is normal.      Breath sounds: Normal breath sounds.   Musculoskeletal:         General: Normal range of motion.      Cervical back: Normal range of motion.   Skin:     General: Skin is warm and dry.   Neurological:      General: No focal deficit present.      Mental Status: She is alert and oriented to person, place, and time. Mental status is at baseline.   Psychiatric:         Mood and Affect: Mood normal.         Behavior: Behavior normal.         Thought Content: Thought content normal.         Judgment: Judgment normal.         "

## 2025-02-04 NOTE — TELEPHONE ENCOUNTER
Caller: Pilar (patient)    Doctor/Office: [facility]    Call regarding :  Re-schedule appointment, due to impending weather for Thursday.     Call was transferred to: [facility] Warm Transfer

## 2025-02-04 NOTE — ASSESSMENT & PLAN NOTE
Blood pressure well-controlled on lisinopril hydrochlorothiazide 20/12.5  Orders:  •  Comprehensive metabolic panel; Future  •  TSH, 3rd generation with Free T4 reflex; Future

## 2025-02-07 ENCOUNTER — OFFICE VISIT (OUTPATIENT)
Age: 75
End: 2025-02-07
Payer: MEDICARE

## 2025-02-07 VITALS — WEIGHT: 196 LBS | BODY MASS INDEX: 31.5 KG/M2 | HEIGHT: 66 IN

## 2025-02-07 DIAGNOSIS — M47.812 CERVICAL SPONDYLOSIS WITHOUT MYELOPATHY: Primary | ICD-10-CM

## 2025-02-07 PROCEDURE — 99214 OFFICE O/P EST MOD 30 MIN: CPT | Performed by: STUDENT IN AN ORGANIZED HEALTH CARE EDUCATION/TRAINING PROGRAM

## 2025-02-07 RX ORDER — AZELASTINE 1 MG/ML
1 SPRAY, METERED NASAL 2 TIMES DAILY
COMMUNITY
Start: 2024-10-18 | End: 2025-10-18

## 2025-02-07 NOTE — PROGRESS NOTES
Assessment:  1. Cervical spondylosis without myelopathy        Plan:    Pilar Laurent is a 74 y.o. female who presents for follow up office visit in regards to. We discussed imaging, rehabilitation, optimization of medications and potential interventions. The following plan was formulated with the patient:    - Schedule for left C4-6 medial branch blocks #1. Complete risks and benefits including bleeding, infection, tissue reaction, nerve injury and allergic reaction were discussed. The approach was demonstrated using models and literature was provided. Verbal and written consent was obtained.  - C/w Meloxicam 15mg daily PRN. Advised to take with food.  - C/w Flexeril 5mg TID PRN. Counseled not to take medication while driving or operating heavy machinery.       The patient has been experiencing moderate to severe axial spine pain that is causing functional deficit.  The pain has been present for at least 3 months and is not improving with conservative care including one or more of the following: home exercise regimen, physician directed home exercise program, physical therapy, or chiropractic care.  Currently the patient is not experiencing any radicular features nor neurogenic claudication.  Non-facet pathology has been ruled out on clinical evaluation.      We will schedule the patient for left C4-6 medial branch nerve blocks #1 with intention of moving forward towards radiofrequency ablation if there is an appropriate diagnostic response. The initial blocks will be performed with 2% lidocaine and if an appropriate response is obtained upon review of the patient's pain diary, a confirmatory block will be scheduled with 0.5% bupivacaine at least 2 weeks after the initial block.     In the office today, we reviewed the nature of facet joint pathology in depth using a spine model. We discussed the approach we would use for the injections and provided literature for home review. The patient understands the risks  associated with the procedure including bleeding, infection, tissue injury, and allergic reaction and provided verbal informed consent in the office today.      No orders of the defined types were placed in this encounter.      New Medications Ordered This Visit   Medications    azelastine (ASTELIN) 0.1 % nasal spray     Si spray into each nostril 2 (two) times a day       My impressions and treatment recommendations were discussed in detail with the patient, who verbalized understanding and had no further questions.    History of Present Illness:  Pilar Laurent is a 74 y.o. female who presents for a follow up office visit in regards to left sided non-radiating neck pain. Since last visit pain is the same . Current pain medications includes: Meloxicam 15mg daily PRN.  The patient reports that this regimen is providing 25% pain relief.      She describes the pain as dull/aching of moderate severity. Pain is rated constant and interferes with daily activities.    Diagnostic studies include Xray Cervical Spine. I have personally reviewed pertinent films in PACS.    Pain is causing significant functional limitation resulting in diminished quality of life and impaired age appropriate ADLs.  Denies fever, chills, numbness/tingling, bowel/bladder dysfunction, motor weakness.    I have personally reviewed and/or updated the patient's past medical history, past surgical history, family history, social history, current medications, allergies, and vital signs today.     Review of Systems   Constitutional:  Negative for chills and fever.   HENT:  Negative for ear pain and sore throat.    Eyes:  Negative for pain and visual disturbance.   Respiratory:  Negative for cough and shortness of breath.    Cardiovascular:  Negative for chest pain and palpitations.   Gastrointestinal:  Positive for constipation. Negative for abdominal pain and vomiting.   Genitourinary:  Negative for dysuria and hematuria.   Musculoskeletal:   Positive for gait problem and neck pain. Negative for arthralgias and back pain.   Skin:  Negative for color change and rash.   Neurological:  Negative for seizures and syncope.   All other systems reviewed and are negative.      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 Figueroa    HAND SURGERY Bilateral 2007    ROTATOR CUFF REPAIR Right     TONSILLECTOMY  1963    TOTAL KNEE ARTHROPLASTY Left 04/14/2013    TOTAL KNEE ARTHROPLASTY Right 1997    VAGINAL DELIVERY         Family History   Problem Relation Age of Onset    Lung cancer Sister     Heart disease Sister     Hypertension Paternal Grandmother     Melanoma Paternal Grandfather         In [place]u of the Skin    No Known Problems Mother     No Known Problems Father     Heart disease Maternal Grandmother     Colon cancer Sister     Atrial fibrillation Sister        Social History     Occupational History    Occupation: Retired   Tobacco Use    Smoking status: Never    Smokeless tobacco: Never   Vaping Use    Vaping status: Never Used   Substance and Sexual Activity    Alcohol use: Not Currently     Comment: social, occ glass of wine    Drug use: No    Sexual activity: Not on file       Current Outpatient Medications on File Prior to Visit   Medication Sig    azelastine (ASTELIN) 0.1 % nasal spray 1 spray 2 (two) times a day    azelastine (ASTELIN) 0.1 %  "nasal spray 1 spray into each nostril 2 (two) times a day    cephalexin (KEFLEX) 500 mg capsule     Cholecalciferol (VITAMIN D3) 2000 units capsule Take 1 tablet by mouth daily    estradiol (ESTRACE) 0.1 mg/g vaginal cream Insert into the vagina daily (Patient taking differently: Insert into the vagina 2 (two) times a week)    fluticasone (FLONASE) 50 mcg/act nasal spray 2 sprays daily Shake liquid and spray    lisinopril-hydrochlorothiazide (PRINZIDE,ZESTORETIC) 20-12.5 MG per tablet TAKE 1 TABLET BY MOUTH DAILY    cyclobenzaprine (FLEXERIL) 5 mg tablet Take 1 tablet (5 mg total) by mouth 3 (three) times a day as needed for muscle spasms    meloxicam (MOBIC) 15 mg tablet Take 1 tablet (15 mg total) by mouth daily as needed for moderate pain     No current facility-administered medications on file prior to visit.       No Known Allergies    Physical Exam:    Ht 5' 6\" (1.676 m)   Wt 88.9 kg (196 lb)   BMI 31.64 kg/m²     Constitutional: normal, well developed, well nourished, alert, in no distress and non-toxic and no overt pain behavior.  HEENT: grossly intact  Neck: supple, symmetric, trachea midline and no masses   Pulmonary:even and unlabored  Cardiovascular:No edema or pitting edema present  Skin:Normal without rashes or lesions and well hydrated  Psychiatric:Mood and affect appropriate  Neurologic:Cranial Nerves II-XII grossly intact    Cervical Musculoskeletal and Neurologic Exam:                        Inspection: no atrophy of the UE musculature                     Gait: non-antalgic                          ROM: limited AROM of the cervical spine in all planes; FROM at bilateral shoulders                          Sensation: intact and symmetric to light touch in bilateral C5-T1 dermatomes                       Strength: 5/5 BUE                              Reflexes: 2+ in bilateral biceps, and triceps. Bedolla's negative bilaterally                            Palpation: TTP cervical paraspinals left side  "             Provocative tests:                                       Kemps (cervical extension and rotation): positive on left  Spurling's: negative bilaterally       Imaging   Xray Cervical Spine  No fracture.   Grade 1 anterolisthesis of C3 on C4, C4 on C5, C5 on C6.   Intervertebral disc space narrowing at C6-7 with endplate osteophytes.   No instability on flexion/extension views.   Neuroforaminal stenoses at C3-4 through C5-6 on the right.   Normal prevertebral soft tissues.   Clear lung apices.   IMPRESSION:   No acute osseous abnormality.   Degenerative changes as described.

## 2025-02-10 ENCOUNTER — TELEPHONE (OUTPATIENT)
Dept: OBGYN CLINIC | Facility: CLINIC | Age: 75
End: 2025-02-10

## 2025-02-10 DIAGNOSIS — I10 ESSENTIAL HYPERTENSION: ICD-10-CM

## 2025-02-10 NOTE — TELEPHONE ENCOUNTER
----- Message from Scott Lee MD sent at 2/7/2025 10:57 AM EST -----   left C4-6 medial branch blocks #1  Thank you

## 2025-02-10 NOTE — TELEPHONE ENCOUNTER
Patient Is scheduled for 2/25    Reviewed instructions: , NPO 1 hour prior, loose-fitting/comfortable clothes, if ill/fever/infx/abx to call and reschedule.  Also pain level at leat 5/10 and refrain from PRN, as-needed pain meds 6h prior.      Patient stated verbal understanding.

## 2025-02-11 RX ORDER — LISINOPRIL AND HYDROCHLOROTHIAZIDE 12.5; 2 MG/1; MG/1
1 TABLET ORAL DAILY
Qty: 90 TABLET | Refills: 1 | Status: SHIPPED | OUTPATIENT
Start: 2025-02-11

## 2025-02-25 ENCOUNTER — APPOINTMENT (OUTPATIENT)
Dept: RADIOLOGY | Facility: HOSPITAL | Age: 75
End: 2025-02-25
Payer: MEDICARE

## 2025-02-25 ENCOUNTER — HOSPITAL ENCOUNTER (OUTPATIENT)
Facility: HOSPITAL | Age: 75
Setting detail: OUTPATIENT SURGERY
Discharge: HOME | End: 2025-02-25
Attending: STUDENT IN AN ORGANIZED HEALTH CARE EDUCATION/TRAINING PROGRAM | Admitting: STUDENT IN AN ORGANIZED HEALTH CARE EDUCATION/TRAINING PROGRAM
Payer: MEDICARE

## 2025-02-25 VITALS
HEIGHT: 66 IN | RESPIRATION RATE: 16 BRPM | HEART RATE: 64 BPM | WEIGHT: 186 LBS | SYSTOLIC BLOOD PRESSURE: 161 MMHG | DIASTOLIC BLOOD PRESSURE: 78 MMHG | TEMPERATURE: 97.8 F | OXYGEN SATURATION: 98 % | BODY MASS INDEX: 29.89 KG/M2

## 2025-02-25 PROCEDURE — 64491 INJ PARAVERT F JNT C/T 2 LEV: CPT | Performed by: STUDENT IN AN ORGANIZED HEALTH CARE EDUCATION/TRAINING PROGRAM

## 2025-02-25 PROCEDURE — 64490 INJ PARAVERT F JNT C/T 1 LEV: CPT | Performed by: STUDENT IN AN ORGANIZED HEALTH CARE EDUCATION/TRAINING PROGRAM

## 2025-02-25 RX ORDER — LIDOCAINE HYDROCHLORIDE 20 MG/ML
INJECTION, SOLUTION EPIDURAL; INFILTRATION; INTRACAUDAL; PERINEURAL AS NEEDED
Status: DISCONTINUED | OUTPATIENT
Start: 2025-02-25 | End: 2025-02-25 | Stop reason: HOSPADM

## 2025-02-25 NOTE — INTERVAL H&P NOTE
H&P reviewed. After examining the patient I find no changes in the patients condition since the H&P had been written.    Vitals:    02/25/25 1001   BP: (!) 184/88   Pulse: 70   Resp: 18   Temp: (!) 96.9 °F (36.1 °C)   SpO2: 96%

## 2025-02-25 NOTE — DISCHARGE INSTR - AVS FIRST PAGE

## 2025-02-25 NOTE — OP NOTE
OPERATIVE REPORT  PATIENT NAME: Pilar Laurent    :  1950  MRN: [id number]  Pt Location:  OR ROOM 13    SURGERY DATE: 2025    Surgeons and Role:     * Scott Lee MD - Primary    Preop Diagnosis:  Cervical spondylosis without myelopathy [M47.812]    Post-Op Diagnosis Codes:     * Cervical spondylosis without myelopathy [M47.812]    Procedure(s):  Left - left C4-6 medial branch blocks #1    Indication: Mechanical neck pain  Preoperative diagnosis: 1. Cervical spondylosis  2. Cervicalgia  Postoperative diagnosis: 1. Cervical spondylosis  2. Cervicalgia  Procedure: Fluoroscopically-guided Medial Branch Nerve Blocks of the left C4-6 facet joint(s) using 2% lidocaine  EBL: none  Specimens: not applicable  After discussing the risks, benefits, and alternatives to the procedure, the patient expressed understanding and wished to proceed. The patient was brought to the fluoroscopy suite and placed in the prone position. Procedural pause conducted to verify: correct patient identity, procedure to be performed and as applicable, correct side and site, correct patient position, and availability of implants, special equipment and special requirements. Using fluoroscopy, the mid-body of the articular pillars of left C4-6 were identified. The skin was sterilely prepped and draped in the usual fashion using Chloraprep skin prep. Using fluoroscopic guidance, a 3.5 inch 25 gauge spinal needle was advanced to each target. After negative aspiration, 0.5cc of 2% lidocaine was injected at each site and the needles were then removed. The patient tolerated the procedure well and there were no apparent complications. After appropriate observation, the patient was dismissed from the clinic in good condition under their own power. The patient was instructed to keep a pain diary and report the results to our office.      SIGNATURE: Scott Lee MD  DATE: 2025  TIME: 11:18 AM

## 2025-03-03 ENCOUNTER — OFFICE VISIT (OUTPATIENT)
Age: 75
End: 2025-03-03
Payer: MEDICARE

## 2025-03-03 VITALS — WEIGHT: 186.07 LBS | HEIGHT: 66 IN | BODY MASS INDEX: 29.9 KG/M2

## 2025-03-03 DIAGNOSIS — M47.812 CERVICAL SPONDYLOSIS: Primary | ICD-10-CM

## 2025-03-03 DIAGNOSIS — M54.2 NECK PAIN: ICD-10-CM

## 2025-03-03 PROCEDURE — 99214 OFFICE O/P EST MOD 30 MIN: CPT | Performed by: STUDENT IN AN ORGANIZED HEALTH CARE EDUCATION/TRAINING PROGRAM

## 2025-03-03 RX ORDER — CYCLOBENZAPRINE HCL 5 MG
5 TABLET ORAL 3 TIMES DAILY PRN
Qty: 90 TABLET | Refills: 1 | Status: SHIPPED | OUTPATIENT
Start: 2025-03-03 | End: 2025-04-02

## 2025-03-03 RX ORDER — MELOXICAM 15 MG/1
15 TABLET ORAL DAILY PRN
Qty: 30 TABLET | Refills: 1 | Status: SHIPPED | OUTPATIENT
Start: 2025-03-03 | End: 2025-04-02

## 2025-03-03 NOTE — PROGRESS NOTES
Assessment:  1. Cervical spondylosis    2. Neck pain        Plan:    Pilar Laurent is a 74 y.o. female who presents for follow up office visit in regards to. We discussed imaging, rehabilitation, optimization of medications and potential interventions. The following plan was formulated with the patient:    - Schedule for left C4-6 Medial branch blocks #2. Complete risks and benefits including bleeding, infection, tissue reaction, nerve injury and allergic reaction were discussed. The approach was demonstrated using models and literature was provided. Verbal and written consent was obtained.  - C/w Meloxicam 15mg daily PRN. Advised to take with food.  - C/w Flexeril 5mg TID PRN. Counseled not to take medication while driving or operating heavy machinery.  - Follow up for left C4-6 MBB#2     The patient has been experiencing moderate to severe axial spine pain that is causing functional deficit.  The pain has been present for at least 3 months and is not improving with conservative care including one or more of the following: home exercise regimen, physician directed home exercise program, physical therapy, or chiropractic care.  Currently the patient is not experiencing any radicular features nor neurogenic claudication.  Non-facet pathology has been ruled out on clinical evaluation.      We will schedule the patient for left C4-6 medial branch nerve blocks #2 with intention of moving forward towards radiofrequency ablation if there is an appropriate diagnostic response. The initial blocks will be performed with 2% lidocaine and if an appropriate response is obtained upon review of the patient's pain diary, a confirmatory block will be scheduled with 0.5% bupivacaine at least 2 weeks after the initial block.     In the office today, we reviewed the nature of facet joint pathology in depth using a spine model. We discussed the approach we would use for the injections and provided literature for home review. The  patient understands the risks associated with the procedure including bleeding, infection, tissue injury, and allergic reaction and provided verbal informed consent in the office today.      No orders of the defined types were placed in this encounter.      New Medications Ordered This Visit   Medications    cyclobenzaprine (FLEXERIL) 5 mg tablet     Sig: Take 1 tablet (5 mg total) by mouth 3 (three) times a day as needed for muscle spasms     Dispense:  90 tablet     Refill:  1    meloxicam (MOBIC) 15 mg tablet     Sig: Take 1 tablet (15 mg total) by mouth daily as needed for moderate pain     Dispense:  30 tablet     Refill:  1       My impressions and treatment recommendations were discussed in detail with the patient, who verbalized understanding and had no further questions.    History of Present Illness:  Pilar Laurent is a 74 y.o. female who presents for a follow up office visit in regards to left sided neck pain. Since last visit pain is the same. Underwent left C4-6 medial branch blocks #1 with significant relief after the procedure and pain returning to baseline that evening. Current pain medications includes:  Meloxicam and Tylenol.  The patient reports that this regimen is providing 50% pain relief when sedentary    She describes the pain as dull/aching intermittent pain when at rest and sharp when active of moderate severity. Pain is rated 2/10 and interferes with daily activities.    Diagnostic studies include Xray cervical spine with C6-7 disc space narrowing, C3-4 and C5-6 neuroforaminal stenosis on right. I have personally reviewed pertinent films in PACS.    Pain is causing significant functional limitation resulting in diminished quality of life and impaired age appropriate ADLs.  Denies fever, chills, numbness/tingling, bowel/bladder dysfunction, motor weakness.    I have personally reviewed and/or updated the patient's past medical history, past surgical history, family history, social history,  current medications, allergies, and vital signs today.     Review of Systems   Constitutional:  Negative for chills and fever.   HENT:  Negative for ear pain and sore throat.    Eyes:  Negative for pain and visual disturbance.   Respiratory:  Negative for cough and shortness of breath.    Cardiovascular:  Negative for chest pain and palpitations.   Gastrointestinal:  Negative for abdominal pain and vomiting.   Genitourinary:  Negative for dysuria and hematuria.   Musculoskeletal:  Positive for neck pain and neck stiffness. Negative for arthralgias and back pain.   Skin:  Negative for color change and rash.   Neurological:  Negative for seizures and syncope.   Psychiatric/Behavioral:  Positive for sleep disturbance.    All other systems reviewed and are negative.      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 Figueroa    HAND SURGERY Bilateral 2007    NERVE BLOCK Left 2/25/2025    Procedure: left C4-6 medial branch blocks #1;  Surgeon: Scott Lee MD;  Location:  MAIN OR;  Service: Pain Management     ROTATOR CUFF REPAIR Right     TONSILLECTOMY  1963    TOTAL KNEE ARTHROPLASTY Left 04/14/2013    TOTAL KNEE ARTHROPLASTY Right 1997    VAGINAL DELIVERY         Family History   Problem Relation Age of Onset    Lung cancer Sister     Heart disease Sister     Hypertension Paternal Grandmother     Melanoma  "Paternal Grandfather         In [place] of the Skin    No Known Problems Mother     No Known Problems Father     Heart disease Maternal Grandmother     Colon cancer Sister     Atrial fibrillation Sister        Social History     Occupational History    Occupation: Retired   Tobacco Use    Smoking status: Never    Smokeless tobacco: Never   Vaping Use    Vaping status: Never Used   Substance and Sexual Activity    Alcohol use: Not Currently     Comment: social, occ glass of wine    Drug use: No    Sexual activity: Not on file       Current Outpatient Medications on File Prior to Visit   Medication Sig    azelastine (ASTELIN) 0.1 % nasal spray 1 spray 2 (two) times a day    Cholecalciferol (VITAMIN D3) 2000 units capsule Take 1 tablet by mouth daily    estradiol (ESTRACE) 0.1 mg/g vaginal cream Insert into the vagina daily    fluticasone (FLONASE) 50 mcg/act nasal spray 2 sprays daily Shake liquid and spray    lisinopril-hydrochlorothiazide (PRINZIDE,ZESTORETIC) 20-12.5 MG per tablet TAKE 1 TABLET BY MOUTH DAILY    [DISCONTINUED] cyclobenzaprine (FLEXERIL) 5 mg tablet Take 1 tablet (5 mg total) by mouth 3 (three) times a day as needed for muscle spasms    [DISCONTINUED] meloxicam (MOBIC) 15 mg tablet Take 1 tablet (15 mg total) by mouth daily as needed for moderate pain     No current facility-administered medications on file prior to visit.       No Known Allergies    Physical Exam:    Ht 5' 6\" (1.676 m)   Wt 84.4 kg (186 lb 1.1 oz)   BMI 30.03 kg/m²     Constitutional: normal, well developed, well nourished, alert, in no distress and non-toxic and no overt pain behavior.  HEENT: grossly intact  Neck: supple, symmetric, trachea midline and no masses   Pulmonary:even and unlabored  Cardiovascular:No edema or pitting edema present  Skin:Normal without rashes or lesions and well hydrated  Psychiatric:Mood and affect appropriate  Neurologic:Cranial Nerves II-XII grossly intact    Cervical Musculoskeletal and Neurologic " Exam:                        Inspection: no atrophy of the UE musculature                     Gait: non-antalgic                          ROM: limited AROM of the cervical spine in all planes; FROM at bilateral shoulders                          Sensation: intact and symmetric to light touch in bilateral C5-T1 dermatomes                       Strength: 5/5 BUE                              Reflexes: 2+ in bilateral biceps, and triceps. Bedolla's negative bilaterally                            Palpation: TTP cervical paraspinals left side              Provocative tests:                                       Kemps (cervical extension and rotation): positive on left  Spurling's: negative bilaterally       Imaging  Xray Cervical Spine  No fracture.   Grade 1 anterolisthesis of C3 on C4, C4 on C5, C5 on C6.   Intervertebral disc space narrowing at C6-7 with endplate osteophytes.   No instability on flexion/extension views.   Neuroforaminal stenoses at C3-4 through C5-6 on the right.   Normal prevertebral soft tissues.   Clear lung apices.   IMPRESSION:   No acute osseous abnormality.   Degenerative changes as described.

## 2025-03-04 ENCOUNTER — TELEPHONE (OUTPATIENT)
Dept: OBGYN CLINIC | Facility: CLINIC | Age: 75
End: 2025-03-04

## 2025-03-04 NOTE — TELEPHONE ENCOUNTER
Patient is scheduled for 4/1.    Reviewed instructions: , NPO 1 hour prior, loose-fitting/comfortable clothes, if ill/fever/infx/abx to call and reschedule.  Also pain level at leat 5/10 and refrain from PRN, as-needed pain meds 6h prior.      Patient stated verbal understanding.      .

## 2025-03-04 NOTE — TELEPHONE ENCOUNTER
----- Message from Scott Lee MD sent at 3/3/2025  1:59 PM EST -----  left C4-6 Medial branch blocks #2  Thank you

## 2025-04-01 ENCOUNTER — APPOINTMENT (OUTPATIENT)
Dept: RADIOLOGY | Facility: HOSPITAL | Age: 75
End: 2025-04-01
Payer: MEDICARE

## 2025-04-01 ENCOUNTER — HOSPITAL ENCOUNTER (OUTPATIENT)
Facility: HOSPITAL | Age: 75
Setting detail: OUTPATIENT SURGERY
Discharge: HOME | End: 2025-04-01
Attending: STUDENT IN AN ORGANIZED HEALTH CARE EDUCATION/TRAINING PROGRAM | Admitting: STUDENT IN AN ORGANIZED HEALTH CARE EDUCATION/TRAINING PROGRAM
Payer: MEDICARE

## 2025-04-01 VITALS
HEIGHT: 67 IN | TEMPERATURE: 97.2 F | HEART RATE: 74 BPM | DIASTOLIC BLOOD PRESSURE: 87 MMHG | BODY MASS INDEX: 29.03 KG/M2 | SYSTOLIC BLOOD PRESSURE: 134 MMHG | WEIGHT: 185 LBS | RESPIRATION RATE: 18 BRPM | OXYGEN SATURATION: 95 %

## 2025-04-01 PROBLEM — M47.812 CERVICAL SPONDYLOSIS: Status: ACTIVE | Noted: 2025-04-01

## 2025-04-01 PROCEDURE — 64491 INJ PARAVERT F JNT C/T 2 LEV: CPT | Performed by: STUDENT IN AN ORGANIZED HEALTH CARE EDUCATION/TRAINING PROGRAM

## 2025-04-01 PROCEDURE — 64490 INJ PARAVERT F JNT C/T 1 LEV: CPT | Performed by: STUDENT IN AN ORGANIZED HEALTH CARE EDUCATION/TRAINING PROGRAM

## 2025-04-01 RX ORDER — BUPIVACAINE HYDROCHLORIDE 2.5 MG/ML
INJECTION, SOLUTION EPIDURAL; INFILTRATION; INTRACAUDAL; PERINEURAL AS NEEDED
Status: DISCONTINUED | OUTPATIENT
Start: 2025-04-01 | End: 2025-04-01 | Stop reason: HOSPADM

## 2025-04-01 RX ORDER — INDOMETHACIN 25 MG/1
CAPSULE ORAL AS NEEDED
Status: DISCONTINUED | OUTPATIENT
Start: 2025-04-01 | End: 2025-04-01 | Stop reason: HOSPADM

## 2025-04-01 NOTE — OP NOTE
OPERATIVE REPORT  PATIENT NAME: Pilar Laurent    :  1950  MRN: [id number]  Pt Location:  OR ROOM 13    SURGERY DATE: 2025    Surgeons and Role:     * Scott Lee MD - Primary    Preop Diagnosis:  Neck pain [M54.2]    Post-Op Diagnosis Codes:     * Neck pain [M54.2]    Procedure(s):  Left - left C4-6 Medial branch blocks #2    Indication: Mechanical neck pain  Preoperative diagnosis: 1. Cervical spondylosis  2. Cervicalgia  Postoperative diagnosis: 1. Cervical spondylosis  2. Cervicalgia  Procedure: Fluoroscopically-guided Medial Branch Nerve Blocks of the left C4-6 facet joint(s) using 0.5% bupivacaine  EBL: none  Specimens: not applicable    After discussing the risks, benefits, and alternatives to the procedure, the patient expressed understanding and wished to proceed. The patient was brought to the fluoroscopy suite and placed in the prone position. Procedural pause conducted to verify: correct patient identity, procedure to be performed and as applicable, correct side and site, correct patient position, and availability of implants, special equipment and special requirements. Using fluoroscopy, the mid-body of the articular pillars of the appropriate levels were identified. The skin was sterilely prepped and draped in the usual fashion using Chloraprep skin prep. Using fluoroscopic guidance, a 3.5 inch 25 gauge spinal needle was advanced to the C4, C5, C6 targets. After negative aspiration, 0.5cc of 0.5% bupivacaine was injected at each site and the needles were then removed. The patient tolerated the procedure well and there were no apparent complications. After appropriate observation, the patient was dismissed from the clinic in good condition under their own power. The patient was instructed to keep a pain diary and report the results to our office.        SIGNATURE: Scott Lee MD  DATE: 2025  TIME: 8:40 AM

## 2025-04-01 NOTE — DISCHARGE INSTR - AVS FIRST PAGE

## 2025-04-01 NOTE — NURSING NOTE
Patient reporting improvement in neck pain immediately after branch block. Pain diary reviewed and patient verbalized understanding. AVS reviewed as well. Bandaids x 2 remain intact to neck. Patient discharged in stable condition.

## 2025-04-01 NOTE — INTERVAL H&P NOTE
H&P reviewed. After examining the patient I find no changes in the patients condition since the H&P had been written.    Vitals:    04/01/25 0723   BP: 143/97   Pulse: 77   Resp: 16   Temp: (!) 96.9 °F (36.1 °C)   SpO2: 97%

## 2025-04-09 ENCOUNTER — OFFICE VISIT (OUTPATIENT)
Age: 75
End: 2025-04-09
Payer: MEDICARE

## 2025-04-09 VITALS — WEIGHT: 185 LBS | HEIGHT: 67 IN | BODY MASS INDEX: 29.03 KG/M2

## 2025-04-09 DIAGNOSIS — M47.812 CERVICAL SPONDYLOSIS: Primary | ICD-10-CM

## 2025-04-09 PROCEDURE — 99214 OFFICE O/P EST MOD 30 MIN: CPT | Performed by: STUDENT IN AN ORGANIZED HEALTH CARE EDUCATION/TRAINING PROGRAM

## 2025-04-09 PROCEDURE — G2211 COMPLEX E/M VISIT ADD ON: HCPCS | Performed by: STUDENT IN AN ORGANIZED HEALTH CARE EDUCATION/TRAINING PROGRAM

## 2025-04-09 NOTE — H&P (VIEW-ONLY)
Assessment:  1. Cervical spondylosis        Plan:    Pilar Laurent is a 74 y.o. female who presents for follow up office visit in regards to neck pain c/w cervical spondylosis. We discussed imaging, rehabilitation, optimization of medications and potential interventions. The following plan was formulated with the patient:    - Schedule for left C4-6 RFA. Complete risks and benefits including bleeding, infection, tissue reaction, nerve injury and allergic reaction were discussed. The approach was demonstrated using models and literature was provided. Verbal and written consent was obtained.  - C/w Meloxicam 15mg daily PRN. Advised to take with food.  - C/w Flexeril 5mg TID PRN. Counseled not to take medication while driving or operating heavy machinery.  - Follow up for left C4-6 RFA    The patient has been experiencing moderate to severe axial spine pain that is causing functional deficit.  The pain has been present for at least 3 months and is not improving with conservative care including one or more of the following: home exercise regimen, physician directed home exercise program, physical therapy, or chiropractic care.  Currently the patient is not experiencing any radicular features nor neurogenic claudication.  Non-facet pathology has been ruled out on clinical evaluation.      In the office today, we reviewed the nature of facet joint pathology in depth using a spine model. We discussed the approach we would use for the injections and provided literature for home review. The patient understands the risks associated with the procedure including bleeding, infection, tissue injury, and allergic reaction and provided verbal informed consent in the office today.    No orders of the defined types were placed in this encounter.      No orders of the defined types were placed in this encounter.      My impressions and treatment recommendations were discussed in detail with the patient, who verbalized understanding  and had no further questions.    History of Present Illness:  Pilar Laurent is a 74 y.o. female who presents for a follow up office visit in regards to non-radiating left sided neck pain. Since last visit pain Underwent left C4-6 medial branch blocks #2 with significant relief after the procedure and pain returning to baseline that evening. 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 Current pain medications includes:  Meloxicam, Flexeril    She describes the pain as dull/aching, sharp of moderate severity. Pain is rated 3/10 and interferes with daily activities.    Diagnostic studies include Xray cervical spine with C6-7 disc space narrowing, C3-4 and C5-6 neuroforaminal stenosis on right. I have personally reviewed pertinent films in PACS.     Pain is causing significant functional limitation resulting in diminished quality of life and impaired age appropriate ADLs.  Denies fever, chills, numbness/tingling, bowel/bladder dysfunction, motor weakness.    Procedural Hx:  - Cervical MBB#1 - 2/25/25  - Cervical MBB#2 - 4/1/25    I have personally reviewed and/or updated the patient's past medical history, past surgical history, family history, social history, current medications, allergies, and vital signs today.     Review of Systems   Constitutional:  Negative for chills, fatigue and unexpected weight change.   HENT:  Negative for ear pain, mouth sores, nosebleeds, sinus pain and sore throat.    Eyes:  Negative for pain and visual disturbance.   Respiratory:  Negative for choking and wheezing.    Cardiovascular:  Negative for chest pain and palpitations.   Gastrointestinal:  Negative for abdominal pain and nausea.   Endocrine: Negative for cold intolerance and heat intolerance.   Genitourinary:  Negative for decreased urine volume, enuresis and hematuria.   Musculoskeletal:  Negative for arthralgias, joint swelling and myalgias.   Skin:  Negative for rash.   Allergic/Immunologic: Negative for  environmental allergies.   Neurological:  Negative for dizziness, weakness and numbness.   Hematological:  Does not bruise/bleed easily.   Psychiatric/Behavioral:  Negative for behavioral problems and self-injury. The patient is not hyperactive.        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 Figueroa    HAND SURGERY Bilateral 2007    NERVE BLOCK Left 2/25/2025    Procedure: left C4-6 medial branch blocks #1;  Surgeon: Scott Lee MD;  Location:  MAIN OR;  Service: Pain Management     NERVE BLOCK Left 4/1/2025    Procedure: left C4-6 Medial branch blocks #2;  Surgeon: Scott Lee MD;  Location:  MAIN OR;  Service: Pain Management     ROTATOR CUFF REPAIR Right     TONSILLECTOMY  1963    TOTAL KNEE ARTHROPLASTY Left 04/14/2013    TOTAL KNEE ARTHROPLASTY Right 1997    VAGINAL DELIVERY         Family History   Problem Relation Age of Onset    Lung cancer Sister     Heart disease Sister     Hypertension Paternal Grandmother     Melanoma Paternal Grandfather         In [place]u of the Skin    No Known Problems Mother     No Known Problems Father     Heart disease Maternal Grandmother     Colon cancer Sister     Atrial fibrillation Sister        Social History     Occupational History    Occupation: Retired   Tobacco Use    Smoking status: Never    Smokeless tobacco: Never  "  Vaping Use    Vaping status: Never Used   Substance and Sexual Activity    Alcohol use: Not Currently     Comment: social, occ glass of wine    Drug use: No    Sexual activity: Not on file       Current Outpatient Medications on File Prior to Visit   Medication Sig    azelastine (ASTELIN) 0.1 % nasal spray 1 spray 2 (two) times a day    Cholecalciferol (VITAMIN D3) 2000 units capsule Take 1 tablet by mouth daily    cyclobenzaprine (FLEXERIL) 5 mg tablet Take 1 tablet (5 mg total) by mouth 3 (three) times a day as needed for muscle spasms    estradiol (ESTRACE) 0.1 mg/g vaginal cream Insert into the vagina daily    fluticasone (FLONASE) 50 mcg/act nasal spray 2 sprays daily Shake liquid and spray    lisinopril-hydrochlorothiazide (PRINZIDE,ZESTORETIC) 20-12.5 MG per tablet TAKE 1 TABLET BY MOUTH DAILY    meloxicam (MOBIC) 15 mg tablet Take 1 tablet (15 mg total) by mouth daily as needed for moderate pain     No current facility-administered medications on file prior to visit.       No Known Allergies    Physical Exam:    Ht 5' 6.5\" (1.689 m)   Wt 83.9 kg (185 lb)   BMI 29.41 kg/m²     Constitutional: normal, well developed, well nourished, alert, in no distress and non-toxic and no overt pain behavior.  HEENT: grossly intact  Neck: supple, symmetric, trachea midline and no masses   Pulmonary:even and unlabored  Cardiovascular:No edema or pitting edema present  Skin:Normal without rashes or lesions and well hydrated  Psychiatric:Mood and affect appropriate  Neurologic:Cranial Nerves II-XII grossly intact    Inspection: no atrophy of the UE musculature                     Gait: non-antalgic                          ROM: limited AROM of the cervical spine in all planes; FROM at bilateral shoulders                          Sensation: intact and symmetric to light touch in bilateral C5-T1 dermatomes                       Strength: 5/5 BUE                              Reflexes: 2+ in bilateral biceps, and triceps. " Bedolla's negative bilaterally                            Palpation: TTP cervical paraspinals left side              Provocative tests:                                       Kemps (cervical extension and rotation): positive on left  Spurling's: negative bilaterally       Imaging  X-ray Cervical Spine  No fracture.   Grade 1 anterolisthesis of C3 on C4, C4 on C5, C5 on C6.   Intervertebral disc space narrowing at C6-7 with endplate osteophytes.   No instability on flexion/extension views.   Neuroforaminal stenoses at C3-4 through C5-6 on the right.   Normal prevertebral soft tissues.   Clear lung apices.   IMPRESSION:   No acute osseous abnormality.   Degenerative changes as described.

## 2025-04-22 ENCOUNTER — HOSPITAL ENCOUNTER (OUTPATIENT)
Facility: HOSPITAL | Age: 75
Setting detail: OUTPATIENT SURGERY
Discharge: HOME | End: 2025-04-22
Attending: STUDENT IN AN ORGANIZED HEALTH CARE EDUCATION/TRAINING PROGRAM | Admitting: STUDENT IN AN ORGANIZED HEALTH CARE EDUCATION/TRAINING PROGRAM
Payer: MEDICARE

## 2025-04-22 ENCOUNTER — TELEPHONE (OUTPATIENT)
Age: 75
End: 2025-04-22

## 2025-04-22 ENCOUNTER — APPOINTMENT (OUTPATIENT)
Dept: RADIOLOGY | Facility: HOSPITAL | Age: 75
End: 2025-04-22
Payer: MEDICARE

## 2025-04-22 VITALS
TEMPERATURE: 74 F | SYSTOLIC BLOOD PRESSURE: 180 MMHG | HEIGHT: 67 IN | DIASTOLIC BLOOD PRESSURE: 87 MMHG | WEIGHT: 185 LBS | RESPIRATION RATE: 18 BRPM | BODY MASS INDEX: 29.03 KG/M2 | HEART RATE: 79 BPM | OXYGEN SATURATION: 77 %

## 2025-04-22 PROCEDURE — 64633 DESTROY CERV/THOR FACET JNT: CPT | Performed by: STUDENT IN AN ORGANIZED HEALTH CARE EDUCATION/TRAINING PROGRAM

## 2025-04-22 PROCEDURE — 64634 DESTROY C/TH FACET JNT ADDL: CPT | Performed by: STUDENT IN AN ORGANIZED HEALTH CARE EDUCATION/TRAINING PROGRAM

## 2025-04-22 RX ORDER — LIDOCAINE HYDROCHLORIDE 20 MG/ML
INJECTION, SOLUTION EPIDURAL; INFILTRATION; INTRACAUDAL; PERINEURAL AS NEEDED
Status: DISCONTINUED | OUTPATIENT
Start: 2025-04-22 | End: 2025-04-22 | Stop reason: HOSPADM

## 2025-04-22 RX ORDER — BUPIVACAINE HYDROCHLORIDE 2.5 MG/ML
INJECTION, SOLUTION EPIDURAL; INFILTRATION; INTRACAUDAL; PERINEURAL AS NEEDED
Status: DISCONTINUED | OUTPATIENT
Start: 2025-04-22 | End: 2025-04-22 | Stop reason: HOSPADM

## 2025-04-22 RX ORDER — INDOMETHACIN 25 MG/1
CAPSULE ORAL AS NEEDED
Status: DISCONTINUED | OUTPATIENT
Start: 2025-04-22 | End: 2025-04-22 | Stop reason: HOSPADM

## 2025-04-22 RX ORDER — DEXAMETHASONE SODIUM PHOSPHATE 10 MG/ML
INJECTION, SOLUTION INTRAMUSCULAR; INTRAVENOUS AS NEEDED
Status: DISCONTINUED | OUTPATIENT
Start: 2025-04-22 | End: 2025-04-22 | Stop reason: HOSPADM

## 2025-04-22 NOTE — NURSING NOTE
No distress. Band-aids (3) clean, dry and intact to left neck. No drainage. Pain 2/10 after procedure. Ambulating without distress.

## 2025-04-22 NOTE — OP NOTE
OPERATIVE REPORT  PATIENT NAME: Pilar Laurent    :  1950  MRN: [id number]  Pt Location:  OR ROOM 13    SURGERY DATE: 2025    Surgeons and Role:     * Scott Lee MD - Primary    Preop Diagnosis:  Cervical spondylosis [M47.812]    Post-Op Diagnosis Codes:     * Cervical spondylosis [M47.812]    Procedure(s):  Left - left C4-6 RFA    Indication: Mechanical neck pain  Preoperative diagnosis: 1. Cervical spondylosis  2. Cervicalgia  Postoperative diagnosis: 1. Cervical spondylosis  2. Cervicalgia  Procedure: Fluoroscopically-guided Radiofrequency denervation of the left C4-6 medial branch nerve(s)  EBL: none  Specimens: not applicable    After discussing the risks, benefits, and alternatives to the procedure, the patient expressed understanding and wished to proceed. The patient was brought to the fluoroscopy suite and placed in the prone position. Procedural pause conducted to verify: correct patient identity, procedure to be performed and as applicable, correct side and site, correct patient position, and availability of implants, special equipment and special requirements. Using fluoroscopy, the mid-body of the articular pillar at the appropriate levels were identified and marked. The skin was sterilely prepped and draped in the usual fashion using Chloraprep skin prep. The skin and subcutaneous tissue were anesthetized with 0.5% lidocaine. Using fluoroscopic guidance, 100 mm 20 gauge RFK RF cannulae with a 10 mm active tip were advanced to the C4, C5, C6 medial branch targets. This was confirmed using PA and lateral fluoroscopic views. Motor testing was performed at 2Hz up to 2 volts, and measured tissue impedances were satisfactory. With final needle positioning, there was no evidence of radicular stimulation. Prior to lesioning, 2% lidocaine was injected at each site. After waiting 2 minutes for local anesthesia to take effect, lesioning was performed at 80 degrees Celsius for 90 seconds. Then  1cc of a solution of 10mg dexamethasone and 2ml 0.25% Bupivacaine was injected at each level.    After RF treatment, each cannula was removed. The patient tolerated the procedure well and there were no apparent complications. After appropriate observation, the patient was dismissed from the clinic in good condition under their own power.    After RF treatment, each cannula was removed. The patient tolerated the procedure well and there were no apparent complications. After appropriate observation, the patient was dismissed from the clinic in good condition under their own power.    SIGNATURE: Scott Lee MD  DATE: April 22, 2025  TIME: 12:36 PM

## 2025-04-22 NOTE — DISCHARGE INSTR - AVS FIRST PAGE

## 2025-04-22 NOTE — INTERVAL H&P NOTE
H&P reviewed. After examining the patient I find no changes in the patients condition since the H&P had been written.    Vitals:    04/22/25 1129   BP: 145/92   Pulse: 72   Resp: 18   Temp: (!) 96.6 °F (35.9 °C)   SpO2: 96%

## 2025-04-23 NOTE — TELEPHONE ENCOUNTER
Caller: benjamin Quezada     Doctor: Dr. Lee     Reason for call: pt returning nurses call     Call back#: [phone]

## 2025-04-23 NOTE — TELEPHONE ENCOUNTER
VINCENZO  S/w Pt. Pt stated current pain level is  2/10. Pt states needle sites look good, denies S&S of infection, denies fevers, C/o mild soreness. Advised Pt if does have pain to take prescribed or OTC pain medications and/or use ice/heat and that it takes 4 to 6 weeks to see the full effect. Confirmed next appt w/ Pt. Pt verbalized understanding.

## 2025-06-04 ENCOUNTER — OFFICE VISIT (OUTPATIENT)
Age: 75
End: 2025-06-04
Payer: MEDICARE

## 2025-06-04 VITALS — HEIGHT: 67 IN | BODY MASS INDEX: 29.03 KG/M2 | WEIGHT: 184.97 LBS

## 2025-06-04 DIAGNOSIS — M54.2 NECK PAIN: Primary | ICD-10-CM

## 2025-06-04 DIAGNOSIS — M79.18 MYOFASCIAL PAIN SYNDROME: ICD-10-CM

## 2025-06-04 DIAGNOSIS — M47.812 CERVICAL SPONDYLOSIS WITHOUT MYELOPATHY: ICD-10-CM

## 2025-06-04 PROCEDURE — 99213 OFFICE O/P EST LOW 20 MIN: CPT | Performed by: STUDENT IN AN ORGANIZED HEALTH CARE EDUCATION/TRAINING PROGRAM

## 2025-06-04 RX ORDER — CEPHALEXIN 500 MG/1
CAPSULE ORAL
COMMUNITY
Start: 2025-06-02

## 2025-06-04 NOTE — PROGRESS NOTES
Assessment:  1. Neck pain    2. Myofascial pain syndrome    3. Cervical spondylosis without myelopathy        Plan:    Pilar Laurent is a 74 y.o. female who presents for follow up office visit in regards to  neck pain c/w cervical spondylosis and myofascial pain. Reports significant improvement in neck pain after RFA however has residual muscle soreness. We discussed imaging, rehabilitation, optimization of medications and potential interventions. The following plan was formulated with the patient:    - Referral to physical therapy  - Offered [facility] TPI for residual muscle spasm; she would like to start with PT   - Follow up in 2 months     Orders Placed This Encounter   Procedures    Ambulatory referral to Physical Therapy     Standing Status:   Future     Expiration Date:   6/4/2026     Referral Priority:   Routine     Referral Type:   Physical Therapy     Referral Reason:   Specialty Services Required     Requested Specialty:   Physical Therapy     Number of Visits Requested:   1     Expiration Date:   6/4/2026       New Medications Ordered This Visit   Medications    cephalexin (KEFLEX) 500 mg capsule       My impressions and treatment recommendations were discussed in detail with the patient, who verbalized understanding and had no further questions.    History of Present Illness:  Pilar Laurent is a 74 y.o. female who presents for a follow up office visit in regards to  non-radiating left sided neck pain. Since last visit underwent left C4-6 RFA . Reports 95% relief in neck pain. Has residual muscle soreness cervical paraspinals on right. 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 I have personally reviewed pertinent films in PACS.     Pain is causing significant functional limitation resulting in diminished quality of life and impaired age appropriate ADLs.   Denies fever, chills, numbness/tingling, bowel/bladder dysfunction, motor weakness.    Procedural Hx:  - Cervical MBB#1 - 2/25/25  - Cervical MBB#2 - 4/1/25  - Cervical RFA - 4/22/25    I have personally reviewed and/or updated the patient's past medical history, past surgical history, family history, social history, current medications, allergies, and vital signs today.     Review of Systems   Constitutional:  Negative for chills and fever.   HENT:  Negative for ear pain and sore throat.    Eyes:  Negative for pain and visual disturbance.   Respiratory:  Negative for cough and shortness of breath.    Cardiovascular:  Negative for chest pain and palpitations.   Gastrointestinal:  Negative for abdominal pain and vomiting.   Genitourinary:  Negative for dysuria and hematuria.   Musculoskeletal:  Positive for neck pain. Negative for arthralgias and back pain.   Skin:  Negative for color change and rash.   Neurological:  Negative for seizures and syncope.   All other systems reviewed and are negative.      Problem List[1]    Past Medical History[2]    Past Surgical History[3]    Family History[4]    Social History     Occupational History    Occupation: Retired   Tobacco Use    Smoking status: Never    Smokeless tobacco: Never   Vaping Use    Vaping status: Never Used   Substance and Sexual Activity    Alcohol use: Not Currently     Comment: social, occ glass of wine    Drug use: No    Sexual activity: Not on file       Current Outpatient Medications on File Prior to Visit   Medication Sig    azelastine (ASTELIN) 0.1 % nasal spray 1 spray in the morning and 1 spray in the evening.    cephalexin (KEFLEX) 500 mg capsule     Cholecalciferol (VITAMIN D3) 2000 units capsule Take 1 tablet by mouth in the morning.    estradiol (ESTRACE) 0.1 mg/g vaginal cream Insert into the vagina daily    fluticasone (FLONASE) 50 mcg/act nasal spray 2 sprays in the morning. Shake liquid and spray.    lisinopril-hydrochlorothiazide  "(PRINZIDE,ZESTORETIC) 20-12.5 MG per tablet TAKE 1 TABLET BY MOUTH DAILY    cyclobenzaprine (FLEXERIL) 5 mg tablet Take 1 tablet (5 mg total) by mouth 3 (three) times a day as needed for muscle spasms    meloxicam (MOBIC) 15 mg tablet Take 1 tablet (15 mg total) by mouth daily as needed for moderate pain     No current facility-administered medications on file prior to visit.       Allergies[5]    Physical Exam:    Ht 5' 6.5\" (1.689 m)   Wt 83.9 kg (184 lb 15.5 oz)   BMI 29.41 kg/m²     Constitutional: normal, well developed, well nourished, alert, in no distress and non-toxic and no overt pain behavior.  HEENT: grossly intact  Neck: supple, symmetric, trachea midline and no masses   Pulmonary:even and unlabored  Cardiovascular:No edema or pitting edema present  Skin:Normal without rashes or lesions and well hydrated  Psychiatric:Mood and affect appropriate  Neurologic:Cranial Nerves II-XII grossly intact    Inspection: no atrophy of the UE musculature                     Gait: non-antalgic                          ROM: limited AROM of the cervical spine in all planes; FROM at bilateral shoulders                          Sensation: intact and symmetric to light touch in bilateral C5-T1 dermatomes                       Strength: 5/5 BUE                              Reflexes: 2+ in bilateral biceps, and triceps. Bedolla's negative bilaterally                            Palpation: TTP cervical paraspinals left side              Provocative tests:                                       Kemps (cervical extension and rotation): positive on left  Spurling's: negative bilaterally        Imaging  X-ray Cervical Spine  No fracture.   Grade 1 anterolisthesis of C3 on C4, C4 on C5, C5 on C6.   Intervertebral disc space narrowing at C6-7 with endplate osteophytes.   No instability on flexion/extension views.   Neuroforaminal stenoses at C3-4 through C5-6 on the right.   Normal prevertebral soft tissues.   Clear lung apices.   " IMPRESSION:   No acute osseous abnormality.   Degenerative changes as described.         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 Figueroa    HAND SURGERY Bilateral 2007    NERVE BLOCK Left 2/25/2025    Procedure: left C4-6 medial branch blocks #1;  Surgeon: Scott Lee MD;  Location:  MAIN OR;  Service: Pain Management     NERVE BLOCK Left 4/1/2025    Procedure: left C4-6 Medial branch blocks #2;  Surgeon: Scott Lee MD;  Location:  [facility] OR;  Service: Pain Management     RADIOFREQUENCY ABLATION Left 4/22/2025    Procedure: left C4-6 RFA;  Surgeon: Scott Lee MD;  Location:  MAIN OR;  Service: Pain Management     ROTATOR CUFF REPAIR Right     TONSILLECTOMY  1963    TOTAL KNEE ARTHROPLASTY Left 04/14/2013    TOTAL KNEE ARTHROPLASTY Right 1997    VAGINAL DELIVERY     [4]   Family History  Problem Relation Name Age of Onset    Lung cancer Sister Komal     Heart disease Sister Komal     Hypertension Paternal Grandmother      Melanoma Paternal Grandfather          In [place] of the Skin    No Known Problems Mother      No Known Problems Father      Heart disease Maternal Grandmother      Colon cancer Sister Michaela     Atrial fibrillation Sister Peg    [5] No Known Allergies

## 2025-06-25 ENCOUNTER — EVALUATION (OUTPATIENT)
Dept: PHYSICAL THERAPY | Facility: MEDICAL CENTER | Age: 75
End: 2025-06-25
Attending: STUDENT IN AN ORGANIZED HEALTH CARE EDUCATION/TRAINING PROGRAM
Payer: MEDICARE

## 2025-06-25 DIAGNOSIS — M54.2 NECK PAIN: ICD-10-CM

## 2025-06-25 DIAGNOSIS — G89.29 CHRONIC NECK PAIN: Primary | ICD-10-CM

## 2025-06-25 DIAGNOSIS — M54.2 CHRONIC NECK PAIN: Primary | ICD-10-CM

## 2025-06-25 PROCEDURE — 97162 PT EVAL MOD COMPLEX 30 MIN: CPT | Performed by: PHYSICAL THERAPIST

## 2025-06-25 NOTE — PROGRESS NOTES
PT Evaluation     Today's date: 2025  Patient name: Pilar Laurent  : 1950  MRN: [id number]  Referring provider: Scott Lee MD  Dx:   Encounter Diagnosis     ICD-10-CM    1. Chronic neck pain  M54.2     G89.29       2. Neck pain  M54.2 Ambulatory referral to Physical Therapy                     Assessment/Plan  Patient is a very pleasant 75 yo female who presents with symptoms of chronic neck pain secondary to postural dysfunction.  Patient responded very well to RFA and her neck pain has been dramatically better since the procedure.  She continues to have muscle spasm and soreness after prolonged sitting reading and with working on jigsaw puzzles.  Postural and ergonomic suggests were made and patient will be changing some of her home environment.  She will not attend formal physical therapy at this time however if home improvements do not change her symptoms she will return for formal treatment.  Patient understood plan and will follow up in several weeks after making changes if needed.      Subjective  Patient states that she has been doing well since the RFA.  Her neck pain is improved and she has returned to pickleball.  Patient is only having discomfort with prolonged sitting and reading or doing work on her jigsaw puzzle.  She describes the pain as tightness in the muscle and soreness that night or the next day.  Discomfort noted with sleeping or moving in bed.     Objective  Red Flags: none  Pain: 1-3/10  Reflexes:     Right Left   Biceps 2+ 2+   Triceps 2+ 2+   Brachioradialis 2+ 2+   Patellar 1+ 1+   Achilles 1+ 1+   Inverted supinator sign neg neg   clonus neg neg   Babinski neg neg   Posture: slight forward head seated  AROM: limited with cervical ROM by 20% with discomfort      Precautions: none

## 2025-07-10 ENCOUNTER — TELEPHONE (OUTPATIENT)
Age: 75
End: 2025-07-10

## 2025-07-10 DIAGNOSIS — M47.812 CERVICAL SPONDYLOSIS WITHOUT MYELOPATHY: Primary | ICD-10-CM

## 2025-07-10 NOTE — TELEPHONE ENCOUNTER
Caller: benjamin Quezada    Doctor: Dr. Lee    Reason for call: pt had a cervical ablation on 4/22 and she is still having issues with a neck muscle that PT isn't helping.  Pt mentioned that the dr told her she could do another injection in her office to help this muscle pain    Call back#: [phone]

## 2025-07-18 ENCOUNTER — PROCEDURE VISIT (OUTPATIENT)
Age: 75
End: 2025-07-18
Payer: MEDICARE

## 2025-07-18 VITALS — WEIGHT: 184.97 LBS | HEIGHT: 67 IN | BODY MASS INDEX: 29.03 KG/M2

## 2025-07-18 DIAGNOSIS — M79.18 MYOFASCIAL PAIN SYNDROME: Primary | ICD-10-CM

## 2025-07-18 PROCEDURE — 76942 ECHO GUIDE FOR BIOPSY: CPT | Performed by: STUDENT IN AN ORGANIZED HEALTH CARE EDUCATION/TRAINING PROGRAM

## 2025-07-18 PROCEDURE — 20552 NJX 1/MLT TRIGGER POINT 1/2: CPT | Performed by: STUDENT IN AN ORGANIZED HEALTH CARE EDUCATION/TRAINING PROGRAM

## 2025-07-18 RX ORDER — METHYLPREDNISOLONE ACETATE 40 MG/ML
20 INJECTION, SUSPENSION INTRA-ARTICULAR; INTRALESIONAL; INTRAMUSCULAR; SOFT TISSUE ONCE
Status: COMPLETED | OUTPATIENT
Start: 2025-07-18 | End: 2025-07-18

## 2025-07-18 RX ORDER — BUPIVACAINE HYDROCHLORIDE 5 MG/ML
1 INJECTION, SOLUTION PERINEURAL ONCE
Status: COMPLETED | OUTPATIENT
Start: 2025-07-18 | End: 2025-07-18

## 2025-07-18 RX ADMIN — METHYLPREDNISOLONE ACETATE 20 MG: 40 INJECTION, SUSPENSION INTRA-ARTICULAR; INTRALESIONAL; INTRAMUSCULAR; SOFT TISSUE at 10:46

## 2025-07-18 RX ADMIN — BUPIVACAINE HYDROCHLORIDE 1 ML: 5 INJECTION, SOLUTION PERINEURAL at 10:46

## 2025-07-18 NOTE — PROGRESS NOTES
Procedure Note  Indication: Myofascial Pain   Preoperative diagnosis: Myofascial pain syndrome  Postoperative diagnosis: Same    Procedure: Ultrasound guided trigger point injections    Muscles targeted: left cervical paraspinal  Medications: 1.5mL used of 1.5mL mixture containing 20mg Depomedrol with 1mL 0.5% bupivacaine     After discussing the risks, benefits, and alternatives to the procedure, the patient expressed understanding and wished to proceed. The patient was placed in sitting position. A procedural pause was conducted to verify: Correct patient identity, procedure to be performed and as applicable, correct side and site, correct patient position, and availability of implants, special equipment or special requirements.    Following this, the area was prepped with Chloraprep. A 2 inch 25 gauge needle was advanced into the identified trigger points with live ultrasound guidance using a 8-12MHz linear ultrasound probe. After negative aspiration of blood, air, or bodily fluids; 1cc of the above injectate was injected into each trigger point.    The patient tolerated the procedure well and there were no apparent complications. After an appropriate amount of observation, the patient was dismissed from the office in good condition under their own power.

## 2025-07-31 ENCOUNTER — TELEPHONE (OUTPATIENT)
Dept: PAIN MEDICINE | Facility: CLINIC | Age: 75
End: 2025-07-31

## (undated) DEVICE — SKIN MARKER DUAL TIP WITH RULER CAP, FLEXIBLE RULER AND LABELS: Brand: DEVON

## (undated) DEVICE — GLOVE PI ULTRA TOUCH SZ.6.5

## (undated) DEVICE — TRAY PAIN SUPPORT

## (undated) DEVICE — SYRINGE 5ML LL

## (undated) DEVICE — PLASTIC ADHESIVE BANDAGE: Brand: CURITY

## (undated) DEVICE — GAUZE SPONGES,16 PLY: Brand: CURITY

## (undated) DEVICE — NEEDLE SPINAL 25G X 3.5 IN QUINCKE

## (undated) DEVICE — PAD GROUNDING IONIC RF DISP

## (undated) DEVICE — DISPOSABLE OR TOWEL: Brand: CARDINAL HEALTH